# Patient Record
Sex: MALE | Race: BLACK OR AFRICAN AMERICAN | NOT HISPANIC OR LATINO | Employment: FULL TIME | URBAN - METROPOLITAN AREA
[De-identification: names, ages, dates, MRNs, and addresses within clinical notes are randomized per-mention and may not be internally consistent; named-entity substitution may affect disease eponyms.]

---

## 2021-07-24 ENCOUNTER — ANESTHESIA (OUTPATIENT)
Dept: ENDOSCOPY | Facility: HOSPITAL | Age: 66
End: 2021-07-24
Payer: MEDICARE

## 2021-07-24 ENCOUNTER — ANESTHESIA EVENT (OUTPATIENT)
Dept: ENDOSCOPY | Facility: HOSPITAL | Age: 66
End: 2021-07-24
Payer: MEDICARE

## 2021-07-24 ENCOUNTER — HOSPITAL ENCOUNTER (EMERGENCY)
Facility: HOSPITAL | Age: 66
Discharge: HOME | End: 2021-07-24
Attending: EMERGENCY MEDICINE
Payer: MEDICARE

## 2021-07-24 VITALS
BODY MASS INDEX: 26.22 KG/M2 | HEIGHT: 70 IN | HEART RATE: 71 BPM | TEMPERATURE: 98 F | SYSTOLIC BLOOD PRESSURE: 122 MMHG | OXYGEN SATURATION: 97 % | DIASTOLIC BLOOD PRESSURE: 84 MMHG | WEIGHT: 183.19 LBS | RESPIRATION RATE: 18 BRPM

## 2021-07-24 DIAGNOSIS — Z01.818 PRE-OP EXAM: ICD-10-CM

## 2021-07-24 DIAGNOSIS — K22.4 ESOPHAGEAL DYSMOTILITY: Primary | ICD-10-CM

## 2021-07-24 DIAGNOSIS — K29.30 CHRONIC SUPERFICIAL GASTRITIS WITHOUT BLEEDING: ICD-10-CM
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
## 2021-07-24 DIAGNOSIS — T18.128A FOOD IMPACTION OF ESOPHAGUS, INITIAL ENCOUNTER: ICD-10-CM

## 2021-07-24 LAB
ALBUMIN SERPL BCP-MCNC: 4.1 G/DL (ref 3.5–5.2)
ALP SERPL-CCNC: 73 U/L (ref 55–135)
ALT SERPL W/O P-5'-P-CCNC: 18 U/L (ref 10–44)
ANION GAP SERPL CALC-SCNC: 11 MMOL/L (ref 8–16)
AST SERPL-CCNC: 18 U/L (ref 10–40)
BASOPHILS # BLD AUTO: 0.02 K/UL (ref 0–0.2)
BASOPHILS NFR BLD: 0.3 % (ref 0–1.9)
BILIRUB SERPL-MCNC: 0.5 MG/DL (ref 0.1–1)
BUN SERPL-MCNC: 9 MG/DL (ref 8–23)
CALCIUM SERPL-MCNC: 9.2 MG/DL (ref 8.7–10.5)
CHLORIDE SERPL-SCNC: 104 MMOL/L (ref 95–110)
CO2 SERPL-SCNC: 24 MMOL/L (ref 23–29)
CREAT SERPL-MCNC: 1.1 MG/DL (ref 0.5–1.4)
DIFFERENTIAL METHOD: ABNORMAL
EOSINOPHIL # BLD AUTO: 0.2 K/UL (ref 0–0.5)
EOSINOPHIL NFR BLD: 3.9 % (ref 0–8)
ERYTHROCYTE [DISTWIDTH] IN BLOOD BY AUTOMATED COUNT: 13.8 % (ref 11.5–14.5)
EST. GFR  (AFRICAN AMERICAN): >60 ML/MIN/1.73 M^2
EST. GFR  (NON AFRICAN AMERICAN): >60 ML/MIN/1.73 M^2
GLUCOSE SERPL-MCNC: 115 MG/DL (ref 70–110)
HCT VFR BLD AUTO: 43.4 % (ref 40–54)
HGB BLD-MCNC: 13.4 G/DL (ref 14–18)
IMM GRANULOCYTES # BLD AUTO: 0.01 K/UL (ref 0–0.04)
IMM GRANULOCYTES NFR BLD AUTO: 0.2 % (ref 0–0.5)
LYMPHOCYTES # BLD AUTO: 1.4 K/UL (ref 1–4.8)
LYMPHOCYTES NFR BLD: 22.2 % (ref 18–48)
MCH RBC QN AUTO: 27 PG (ref 27–31)
MCHC RBC AUTO-ENTMCNC: 30.9 G/DL (ref 32–36)
MCV RBC AUTO: 87 FL (ref 82–98)
MONOCYTES # BLD AUTO: 0.3 K/UL (ref 0.3–1)
MONOCYTES NFR BLD: 5.6 % (ref 4–15)
NEUTROPHILS # BLD AUTO: 4.2 K/UL (ref 1.8–7.7)
NEUTROPHILS NFR BLD: 67.8 % (ref 38–73)
NRBC BLD-RTO: 0 /100 WBC
PLATELET # BLD AUTO: 171 K/UL (ref 150–450)
PMV BLD AUTO: 9 FL (ref 9.2–12.9)
POTASSIUM SERPL-SCNC: 3.9 MMOL/L (ref 3.5–5.1)
PROT SERPL-MCNC: 7.7 G/DL (ref 6–8.4)
RBC # BLD AUTO: 4.97 M/UL (ref 4.6–6.2)
SODIUM SERPL-SCNC: 139 MMOL/L (ref 136–145)
WBC # BLD AUTO: 6.12 K/UL (ref 3.9–12.7)

## 2021-07-24 PROCEDURE — 88305 TISSUE EXAM BY PATHOLOGIST: CPT | Mod: 26,,, | Performed by: PATHOLOGY

## 2021-07-24 PROCEDURE — 27201042 HC RETRIEVAL NET: Performed by: INTERNAL MEDICINE

## 2021-07-24 PROCEDURE — 80053 COMPREHEN METABOLIC PANEL: CPT | Performed by: EMERGENCY MEDICINE

## 2021-07-24 PROCEDURE — 27201012 HC FORCEPS, HOT/COLD, DISP: Performed by: INTERNAL MEDICINE

## 2021-07-24 PROCEDURE — 43239 EGD BIOPSY SINGLE/MULTIPLE: CPT | Performed by: INTERNAL MEDICINE

## 2021-07-24 PROCEDURE — 99499 NO LOS: ICD-10-PCS | Mod: ,,, | Performed by: INTERNAL MEDICINE

## 2021-07-24 PROCEDURE — 93005 ELECTROCARDIOGRAM TRACING: CPT

## 2021-07-24 PROCEDURE — 25000003 PHARM REV CODE 250: Performed by: NURSE ANESTHETIST, CERTIFIED REGISTERED

## 2021-07-24 PROCEDURE — 93010 EKG 12-LEAD: ICD-10-PCS | Mod: ,,, | Performed by: INTERNAL MEDICINE

## 2021-07-24 PROCEDURE — 93010 ELECTROCARDIOGRAM REPORT: CPT | Mod: ,,, | Performed by: INTERNAL MEDICINE

## 2021-07-24 PROCEDURE — 88342 IMHCHEM/IMCYTCHM 1ST ANTB: CPT | Performed by: PATHOLOGY

## 2021-07-24 PROCEDURE — 88305 TISSUE EXAM BY PATHOLOGIST: ICD-10-PCS | Mod: 26,,, | Performed by: PATHOLOGY

## 2021-07-24 PROCEDURE — 37000009 HC ANESTHESIA EA ADD 15 MINS: Performed by: INTERNAL MEDICINE

## 2021-07-24 PROCEDURE — 37000008 HC ANESTHESIA 1ST 15 MINUTES: Performed by: INTERNAL MEDICINE

## 2021-07-24 PROCEDURE — 88305 TISSUE EXAM BY PATHOLOGIST: CPT | Performed by: PATHOLOGY

## 2021-07-24 PROCEDURE — 88342 IMHCHEM/IMCYTCHM 1ST ANTB: CPT | Mod: 26,,, | Performed by: PATHOLOGY

## 2021-07-24 PROCEDURE — 99499 UNLISTED E&M SERVICE: CPT | Mod: ,,, | Performed by: INTERNAL MEDICINE

## 2021-07-24 PROCEDURE — 63600175 PHARM REV CODE 636 W HCPCS: Mod: JG | Performed by: NURSE PRACTITIONER

## 2021-07-24 PROCEDURE — 85025 COMPLETE CBC W/AUTO DIFF WBC: CPT | Performed by: EMERGENCY MEDICINE

## 2021-07-24 PROCEDURE — 96374 THER/PROPH/DIAG INJ IV PUSH: CPT

## 2021-07-24 PROCEDURE — 43239 EGD BIOPSY SINGLE/MULTIPLE: CPT | Mod: ,,, | Performed by: INTERNAL MEDICINE

## 2021-07-24 PROCEDURE — 63600175 PHARM REV CODE 636 W HCPCS: Performed by: NURSE ANESTHETIST, CERTIFIED REGISTERED

## 2021-07-24 PROCEDURE — 88342 CHG IMMUNOCYTOCHEMISTRY: ICD-10-PCS | Mod: 26,,, | Performed by: PATHOLOGY

## 2021-07-24 PROCEDURE — 43239 PR EGD, FLEX, W/BIOPSY, SGL/MULTI: ICD-10-PCS | Mod: ,,, | Performed by: INTERNAL MEDICINE

## 2021-07-24 PROCEDURE — 99291 CRITICAL CARE FIRST HOUR: CPT | Mod: 25

## 2021-07-24 RX ORDER — LIDOCAINE HYDROCHLORIDE 10 MG/ML
INJECTION, SOLUTION EPIDURAL; INFILTRATION; INTRACAUDAL; PERINEURAL
Status: DISCONTINUED | OUTPATIENT
Start: 2021-07-24 | End: 2021-07-24

## 2021-07-24 RX ORDER — PROPOFOL 10 MG/ML
VIAL (ML) INTRAVENOUS
Status: DISCONTINUED | OUTPATIENT
Start: 2021-07-24 | End: 2021-07-24

## 2021-07-24 RX ORDER — SODIUM CHLORIDE 0.9 % (FLUSH) 0.9 %
10 SYRINGE (ML) INJECTION
Status: CANCELLED | OUTPATIENT
Start: 2021-07-24

## 2021-07-24 RX ORDER — SUCCINYLCHOLINE CHLORIDE 20 MG/ML
INJECTION INTRAMUSCULAR; INTRAVENOUS
Status: DISCONTINUED | OUTPATIENT
Start: 2021-07-24 | End: 2021-07-24

## 2021-07-24 RX ORDER — SODIUM CHLORIDE, SODIUM LACTATE, POTASSIUM CHLORIDE, CALCIUM CHLORIDE 600; 310; 30; 20 MG/100ML; MG/100ML; MG/100ML; MG/100ML
INJECTION, SOLUTION INTRAVENOUS CONTINUOUS
Status: CANCELLED | OUTPATIENT
Start: 2021-07-24

## 2021-07-24 RX ORDER — GLUCAGON 1 MG
1 KIT INJECTION
Status: COMPLETED | OUTPATIENT
Start: 2021-07-24 | End: 2021-07-24

## 2021-07-24 RX ADMIN — PROPOFOL 160 MG: 10 INJECTION, EMULSION INTRAVENOUS at 06:07

## 2021-07-24 RX ADMIN — GLUCAGON HYDROCHLORIDE 1 MG: KIT at 05:07

## 2021-07-24 RX ADMIN — LIDOCAINE HYDROCHLORIDE 5 ML: 10 INJECTION, SOLUTION EPIDURAL; INFILTRATION; INTRACAUDAL; PERINEURAL at 06:07

## 2021-07-24 RX ADMIN — SUCCINYLCHOLINE CHLORIDE 140 MG: 20 INJECTION, SOLUTION INTRAMUSCULAR; INTRAVENOUS at 06:07

## 2021-07-31 LAB
FINAL PATHOLOGIC DIAGNOSIS: NORMAL
GROSS: NORMAL
Lab: NORMAL

## 2021-08-04 ENCOUNTER — PATIENT MESSAGE (OUTPATIENT)
Dept: GASTROENTEROLOGY | Facility: CLINIC | Age: 66
End: 2021-08-04

## 2021-08-09 ENCOUNTER — PATIENT MESSAGE (OUTPATIENT)
Dept: GASTROENTEROLOGY | Facility: CLINIC | Age: 66
End: 2021-08-09

## 2022-01-12 ENCOUNTER — PATIENT MESSAGE (OUTPATIENT)
Dept: ENDOSCOPY | Facility: HOSPITAL | Age: 67
End: 2022-01-12
Payer: MEDICARE

## 2022-05-02 ENCOUNTER — HOSPITAL ENCOUNTER (OUTPATIENT)
Dept: RADIOLOGY | Facility: HOSPITAL | Age: 67
Discharge: HOME | End: 2022-05-02
Attending: INTERNAL MEDICINE
Payer: MEDICARE

## 2022-05-02 ENCOUNTER — OFFICE VISIT (OUTPATIENT)
Dept: INTERNAL MEDICINE | Facility: CLINIC | Age: 67
End: 2022-05-02
Payer: MEDICARE

## 2022-05-02 VITALS
TEMPERATURE: 98 F | HEART RATE: 74 BPM | HEIGHT: 70 IN | WEIGHT: 180.56 LBS | SYSTOLIC BLOOD PRESSURE: 118 MMHG | OXYGEN SATURATION: 96 % | BODY MASS INDEX: 25.85 KG/M2 | DIASTOLIC BLOOD PRESSURE: 70 MMHG

## 2022-05-02 DIAGNOSIS — Z90.2 STATUS POST PNEUMONECTOMY: ICD-10-CM

## 2022-05-02 DIAGNOSIS — Z12.5 ENCOUNTER FOR SCREENING FOR MALIGNANT NEOPLASM OF PROSTATE: ICD-10-CM

## 2022-05-02 DIAGNOSIS — R10.9 ABDOMINAL DISCOMFORT: ICD-10-CM

## 2022-05-02 DIAGNOSIS — Z00.00 ROUTINE GENERAL MEDICAL EXAMINATION AT HEALTH CARE FACILITY: Primary | ICD-10-CM

## 2022-05-02 DIAGNOSIS — K22.2 ESOPHAGEAL STRICTURE: ICD-10-CM

## 2022-05-02 DIAGNOSIS — E78.2 MIXED HYPERLIPIDEMIA: ICD-10-CM

## 2022-05-02 PROCEDURE — 99999 PR PBB SHADOW E&M-EST. PATIENT-LVL IV: CPT | Mod: PBBFAC,,, | Performed by: INTERNAL MEDICINE

## 2022-05-02 PROCEDURE — 71046 X-RAY EXAM CHEST 2 VIEWS: CPT | Mod: 26,,, | Performed by: RADIOLOGY

## 2022-05-02 PROCEDURE — 1125F PR PAIN SEVERITY QUANTIFIED, PAIN PRESENT: ICD-10-PCS | Mod: CPTII,S$GLB,, | Performed by: INTERNAL MEDICINE

## 2022-05-02 PROCEDURE — 71046 X-RAY EXAM CHEST 2 VIEWS: CPT | Mod: TC,FY,PO

## 2022-05-02 PROCEDURE — 3074F PR MOST RECENT SYSTOLIC BLOOD PRESSURE < 130 MM HG: ICD-10-PCS | Mod: CPTII,S$GLB,, | Performed by: INTERNAL MEDICINE

## 2022-05-02 PROCEDURE — 1125F AMNT PAIN NOTED PAIN PRSNT: CPT | Mod: CPTII,S$GLB,, | Performed by: INTERNAL MEDICINE

## 2022-05-02 PROCEDURE — 1160F RVW MEDS BY RX/DR IN RCRD: CPT | Mod: CPTII,S$GLB,, | Performed by: INTERNAL MEDICINE

## 2022-05-02 PROCEDURE — 99387 INIT PM E/M NEW PAT 65+ YRS: CPT | Mod: S$GLB,,, | Performed by: INTERNAL MEDICINE

## 2022-05-02 PROCEDURE — 1159F PR MEDICATION LIST DOCUMENTED IN MEDICAL RECORD: ICD-10-PCS | Mod: CPTII,S$GLB,, | Performed by: INTERNAL MEDICINE

## 2022-05-02 PROCEDURE — 3288F PR FALLS RISK ASSESSMENT DOCUMENTED: ICD-10-PCS | Mod: CPTII,S$GLB,, | Performed by: INTERNAL MEDICINE

## 2022-05-02 PROCEDURE — 99999 PR PBB SHADOW E&M-EST. PATIENT-LVL IV: ICD-10-PCS | Mod: PBBFAC,,, | Performed by: INTERNAL MEDICINE

## 2022-05-02 PROCEDURE — 3008F PR BODY MASS INDEX (BMI) DOCUMENTED: ICD-10-PCS | Mod: CPTII,S$GLB,, | Performed by: INTERNAL MEDICINE

## 2022-05-02 PROCEDURE — 71046 XR CHEST PA AND LATERAL: ICD-10-PCS | Mod: 26,,, | Performed by: RADIOLOGY

## 2022-05-02 PROCEDURE — 3288F FALL RISK ASSESSMENT DOCD: CPT | Mod: CPTII,S$GLB,, | Performed by: INTERNAL MEDICINE

## 2022-05-02 PROCEDURE — 1101F PR PT FALLS ASSESS DOC 0-1 FALLS W/OUT INJ PAST YR: ICD-10-PCS | Mod: CPTII,S$GLB,, | Performed by: INTERNAL MEDICINE

## 2022-05-02 PROCEDURE — 74019 RADEX ABDOMEN 2 VIEWS: CPT | Mod: TC,FY,PO

## 2022-05-02 PROCEDURE — 74019 XR ABDOMEN FLAT AND ERECT: ICD-10-PCS | Mod: 26,,, | Performed by: RADIOLOGY

## 2022-05-02 PROCEDURE — 1101F PT FALLS ASSESS-DOCD LE1/YR: CPT | Mod: CPTII,S$GLB,, | Performed by: INTERNAL MEDICINE

## 2022-05-02 PROCEDURE — 3078F PR MOST RECENT DIASTOLIC BLOOD PRESSURE < 80 MM HG: ICD-10-PCS | Mod: CPTII,S$GLB,, | Performed by: INTERNAL MEDICINE

## 2022-05-02 PROCEDURE — 1159F MED LIST DOCD IN RCRD: CPT | Mod: CPTII,S$GLB,, | Performed by: INTERNAL MEDICINE

## 2022-05-02 PROCEDURE — 3078F DIAST BP <80 MM HG: CPT | Mod: CPTII,S$GLB,, | Performed by: INTERNAL MEDICINE

## 2022-05-02 PROCEDURE — 99387 PR PREVENTIVE VISIT,NEW,65 & OVER: ICD-10-PCS | Mod: S$GLB,,, | Performed by: INTERNAL MEDICINE

## 2022-05-02 PROCEDURE — 74019 RADEX ABDOMEN 2 VIEWS: CPT | Mod: 26,,, | Performed by: RADIOLOGY

## 2022-05-02 PROCEDURE — 3008F BODY MASS INDEX DOCD: CPT | Mod: CPTII,S$GLB,, | Performed by: INTERNAL MEDICINE

## 2022-05-02 PROCEDURE — 3074F SYST BP LT 130 MM HG: CPT | Mod: CPTII,S$GLB,, | Performed by: INTERNAL MEDICINE

## 2022-05-02 PROCEDURE — 1160F PR REVIEW ALL MEDS BY PRESCRIBER/CLIN PHARMACIST DOCUMENTED: ICD-10-PCS | Mod: CPTII,S$GLB,, | Performed by: INTERNAL MEDICINE

## 2022-05-02 NOTE — PROGRESS NOTES
Subjective:       Patient ID: Morris Parada is a 66 y.o. male.    Chief Complaint: Establish Care    HPI Patient is a 66-year-old male presenting days new patient.  He was previously followed by Dr. Tolentino.  He states been followed for 5 years since he seen a primary care doctor.  He indicates a history of hyperlipidemia and some problems with esophageal stricture.  He states that he was seen by Dr. Briscoe and had an EGD done with some dilatation a little while back.  He states that was helpful but he has continued to experience issues with dysphagia since the dilatation.  He has not followed up with GI.    He also describes he has had some issues with some mild abdominal discomfort last few days.  States in the superior aspect of the abdomen up up around the area of the rib cage but it is below the ribs.  Is more to the left of midline.  It does not radiate.  He has no nausea vomiting associated with it.    Patient relates he has had problems with swallowing and GI type issues for most of his life.  He states when he was a child he had issues with reflux and food coming up and at some point they had removed part of his lung as result of this issue as food gotten into the lung.  He cannot remember whether was the right or left lung.  But he relates partial pneumonectomy in the past.    Review of Systems   Constitutional: Negative for fever and unexpected weight change.   HENT: Negative for hearing loss, postnasal drip and rhinorrhea.    Eyes: Negative for pain and visual disturbance.   Respiratory: Negative for cough, shortness of breath and wheezing.    Cardiovascular: Negative for chest pain and palpitations.   Gastrointestinal: Negative for constipation, diarrhea, nausea and vomiting.   Genitourinary: Negative for dysuria and hematuria.   Musculoskeletal: Negative for arthralgias, back pain, myalgias and neck stiffness.   Skin: Negative for pallor and rash.   Neurological: Negative for seizures, syncope and  "headaches.   Hematological: Negative for adenopathy.   Psychiatric/Behavioral: Negative for dysphoric mood. The patient is not nervous/anxious.        Objective:   /70   Pulse 74   Temp 97.5 °F (36.4 °C) (Temporal)   Ht 5' 10" (1.778 m)   Wt 81.9 kg (180 lb 8.9 oz)   SpO2 96%   BMI 25.91 kg/m²      Physical Exam  Vitals reviewed.   Constitutional:       General: He is not in acute distress.     Appearance: He is well-developed.   HENT:      Head: Normocephalic and atraumatic.      Right Ear: Tympanic membrane and ear canal normal.      Left Ear: Tympanic membrane and ear canal normal.   Eyes:      Pupils: Pupils are equal, round, and reactive to light.   Neck:      Thyroid: No thyromegaly.      Vascular: No JVD.   Cardiovascular:      Rate and Rhythm: Normal rate and regular rhythm.      Heart sounds: Normal heart sounds. No murmur heard.    No friction rub. No gallop.   Pulmonary:      Effort: Pulmonary effort is normal.      Breath sounds: Normal breath sounds. No wheezing or rales.   Abdominal:      General: Bowel sounds are normal. There is no distension.      Palpations: Abdomen is soft.      Tenderness: There is abdominal tenderness (Mild tenderness epigastric.  No mass.  No rebound.). There is no guarding or rebound.   Musculoskeletal:         General: Normal range of motion.      Cervical back: Normal range of motion and neck supple.   Lymphadenopathy:      Cervical: No cervical adenopathy.   Skin:     General: Skin is warm and dry.      Findings: No rash.   Neurological:      General: No focal deficit present.      Mental Status: He is alert and oriented to person, place, and time.      Cranial Nerves: No cranial nerve deficit.      Deep Tendon Reflexes: Reflexes are normal and symmetric.   Psychiatric:         Mood and Affect: Mood normal.         Judgment: Judgment normal.             Assessment:       1. Routine general medical examination at health care facility    2. Mixed hyperlipidemia  "   3. Esophageal stricture    4. Abdominal discomfort    5. Status post pneumonectomy    6. Encounter for screening for malignant neoplasm of prostate        Plan:   No problem-specific Assessment & Plan notes found for this encounter.    Routine general medical examination at health care facility  Comments:  Focus on good health habits, low fat diet, regular exercise, seatbelt use, sunscreen use  Orders:  -     CBC Auto Differential; Future; Expected date: 05/02/2022  -     Comprehensive Metabolic Panel; Future; Expected date: 05/02/2022  -     Lipid Panel; Future; Expected date: 05/02/2022    Mixed hyperlipidemia  -     CBC Auto Differential; Future; Expected date: 05/02/2022  -     Comprehensive Metabolic Panel; Future; Expected date: 05/02/2022  -     Lipid Panel; Future; Expected date: 05/02/2022    Esophageal stricture  Comments:  make referral to Dr. Briscoe  Orders:  -     Ambulatory referral/consult to Gastroenterology; Future; Expected date: 05/09/2022    Abdominal discomfort  Comments:  xrays today  Orders:  -     X-Ray Abdomen Flat And Erect; Future; Expected date: 05/02/2022    Status post pneumonectomy  Comments:  Reports partial pneumonectomy in childhood.  update xray  Orders:  -     X-Ray Chest PA And Lateral; Future; Expected date: 05/02/2022    Encounter for screening for malignant neoplasm of prostate  -     PSA, Screening; Future; Expected date: 05/02/2022    We will update lab work for a general health screening purposes.  We will make a follow-up appoint with Dr. Briscoe in Gastroenterology to reassess on the esophageal stricture and dysphagia issues.  Will get x-rays flat and upright of the abdomen given little discomfort he has gotten epigastric region.  If everything looks good on the films him a trial acid suppression as he is not on anything for acid at this point.  We will also get updated chest x-ray to see if we can determine if he has got volume loss on left of the right due to the  pneumonectomy so we can determine which lung may have been operated on.      Follow up in about 4 weeks (around 5/30/2022).

## 2022-05-03 ENCOUNTER — LAB VISIT (OUTPATIENT)
Dept: LAB | Facility: HOSPITAL | Age: 67
End: 2022-05-03
Attending: INTERNAL MEDICINE
Payer: MEDICARE

## 2022-05-03 ENCOUNTER — PATIENT MESSAGE (OUTPATIENT)
Dept: INTERNAL MEDICINE | Facility: CLINIC | Age: 67
End: 2022-05-03
Payer: MEDICARE

## 2022-05-03 DIAGNOSIS — D64.9 ANEMIA, UNSPECIFIED TYPE: ICD-10-CM

## 2022-05-03 DIAGNOSIS — R97.20 INCREASED PROSTATE SPECIFIC ANTIGEN (PSA) VELOCITY: ICD-10-CM

## 2022-05-03 DIAGNOSIS — E78.2 MIXED HYPERLIPIDEMIA: Primary | ICD-10-CM

## 2022-05-03 LAB
FERRITIN SERPL-MCNC: 176 NG/ML (ref 20–300)
IRON SERPL-MCNC: 66 UG/DL (ref 45–160)
SATURATED IRON: 22 % (ref 20–50)
TOTAL IRON BINDING CAPACITY: 299 UG/DL (ref 250–450)
TRANSFERRIN SERPL-MCNC: 202 MG/DL (ref 200–375)

## 2022-05-03 PROCEDURE — 36415 COLL VENOUS BLD VENIPUNCTURE: CPT | Mod: PO | Performed by: INTERNAL MEDICINE

## 2022-05-03 PROCEDURE — 82728 ASSAY OF FERRITIN: CPT | Performed by: INTERNAL MEDICINE

## 2022-05-03 PROCEDURE — 84466 ASSAY OF TRANSFERRIN: CPT | Performed by: INTERNAL MEDICINE

## 2022-05-03 RX ORDER — DOXYCYCLINE HYCLATE 100 MG
100 TABLET ORAL EVERY 12 HOURS
Qty: 60 TABLET | Refills: 0 | Status: SHIPPED | OUTPATIENT
Start: 2022-05-03 | End: 2022-06-02

## 2022-05-03 RX ORDER — ATORVASTATIN CALCIUM 40 MG/1
40 TABLET, FILM COATED ORAL DAILY
Qty: 90 TABLET | Refills: 3 | Status: SHIPPED | OUTPATIENT
Start: 2022-05-03 | End: 2023-05-15

## 2022-05-03 NOTE — TELEPHONE ENCOUNTER
Mild anemia on labs.  Follow up labs ordered.    Cholesterol numbers are very high.  Needs to start statin.  Atorvastatin 40 mg once daily. Repeat labs in six months.    PSA is 5.8.  This is higher than target.  Start Doxycycline 100 mg twice daily for 30 days.  Repeat psa in one month.

## 2022-05-04 DIAGNOSIS — D64.9 ANEMIA, UNSPECIFIED TYPE: Primary | ICD-10-CM

## 2022-06-03 ENCOUNTER — LAB VISIT (OUTPATIENT)
Dept: LAB | Facility: HOSPITAL | Age: 67
End: 2022-06-03
Attending: INTERNAL MEDICINE
Payer: MEDICARE

## 2022-06-03 DIAGNOSIS — R97.20 INCREASED PROSTATE SPECIFIC ANTIGEN (PSA) VELOCITY: ICD-10-CM

## 2022-06-03 LAB — COMPLEXED PSA SERPL-MCNC: 6.2 NG/ML (ref 0–4)

## 2022-06-03 PROCEDURE — 36415 COLL VENOUS BLD VENIPUNCTURE: CPT | Mod: PO | Performed by: INTERNAL MEDICINE

## 2022-06-03 PROCEDURE — 84153 ASSAY OF PSA TOTAL: CPT | Performed by: INTERNAL MEDICINE

## 2022-06-06 ENCOUNTER — TELEPHONE (OUTPATIENT)
Dept: INTERNAL MEDICINE | Facility: CLINIC | Age: 67
End: 2022-06-06
Payer: MEDICARE

## 2022-06-06 ENCOUNTER — OFFICE VISIT (OUTPATIENT)
Dept: GASTROENTEROLOGY | Facility: CLINIC | Age: 67
End: 2022-06-06
Payer: MEDICARE

## 2022-06-06 VITALS
DIASTOLIC BLOOD PRESSURE: 74 MMHG | HEIGHT: 70 IN | SYSTOLIC BLOOD PRESSURE: 118 MMHG | HEART RATE: 73 BPM | BODY MASS INDEX: 26.18 KG/M2 | OXYGEN SATURATION: 96 % | WEIGHT: 182.88 LBS

## 2022-06-06 DIAGNOSIS — R97.20 INCREASED PROSTATE SPECIFIC ANTIGEN (PSA) VELOCITY: Primary | ICD-10-CM

## 2022-06-06 DIAGNOSIS — R13.19 ESOPHAGEAL DYSPHAGIA: Primary | ICD-10-CM

## 2022-06-06 DIAGNOSIS — K22.2 ESOPHAGEAL STRICTURE: ICD-10-CM

## 2022-06-06 DIAGNOSIS — K22.2 ESOPHAGEAL STENOSIS: ICD-10-CM

## 2022-06-06 PROCEDURE — 3078F PR MOST RECENT DIASTOLIC BLOOD PRESSURE < 80 MM HG: ICD-10-PCS | Mod: CPTII,S$GLB,, | Performed by: INTERNAL MEDICINE

## 2022-06-06 PROCEDURE — 1159F MED LIST DOCD IN RCRD: CPT | Mod: CPTII,S$GLB,, | Performed by: INTERNAL MEDICINE

## 2022-06-06 PROCEDURE — 3288F FALL RISK ASSESSMENT DOCD: CPT | Mod: CPTII,S$GLB,, | Performed by: INTERNAL MEDICINE

## 2022-06-06 PROCEDURE — 3288F PR FALLS RISK ASSESSMENT DOCUMENTED: ICD-10-PCS | Mod: CPTII,S$GLB,, | Performed by: INTERNAL MEDICINE

## 2022-06-06 PROCEDURE — 3008F BODY MASS INDEX DOCD: CPT | Mod: CPTII,S$GLB,, | Performed by: INTERNAL MEDICINE

## 2022-06-06 PROCEDURE — 3008F PR BODY MASS INDEX (BMI) DOCUMENTED: ICD-10-PCS | Mod: CPTII,S$GLB,, | Performed by: INTERNAL MEDICINE

## 2022-06-06 PROCEDURE — 99999 PR PBB SHADOW E&M-EST. PATIENT-LVL III: CPT | Mod: PBBFAC,,, | Performed by: INTERNAL MEDICINE

## 2022-06-06 PROCEDURE — 1101F PR PT FALLS ASSESS DOC 0-1 FALLS W/OUT INJ PAST YR: ICD-10-PCS | Mod: CPTII,S$GLB,, | Performed by: INTERNAL MEDICINE

## 2022-06-06 PROCEDURE — 1160F RVW MEDS BY RX/DR IN RCRD: CPT | Mod: CPTII,S$GLB,, | Performed by: INTERNAL MEDICINE

## 2022-06-06 PROCEDURE — 3074F PR MOST RECENT SYSTOLIC BLOOD PRESSURE < 130 MM HG: ICD-10-PCS | Mod: CPTII,S$GLB,, | Performed by: INTERNAL MEDICINE

## 2022-06-06 PROCEDURE — 99213 OFFICE O/P EST LOW 20 MIN: CPT | Mod: S$GLB,,, | Performed by: INTERNAL MEDICINE

## 2022-06-06 PROCEDURE — 99213 PR OFFICE/OUTPT VISIT, EST, LEVL III, 20-29 MIN: ICD-10-PCS | Mod: S$GLB,,, | Performed by: INTERNAL MEDICINE

## 2022-06-06 PROCEDURE — 99999 PR PBB SHADOW E&M-EST. PATIENT-LVL III: ICD-10-PCS | Mod: PBBFAC,,, | Performed by: INTERNAL MEDICINE

## 2022-06-06 PROCEDURE — 1101F PT FALLS ASSESS-DOCD LE1/YR: CPT | Mod: CPTII,S$GLB,, | Performed by: INTERNAL MEDICINE

## 2022-06-06 PROCEDURE — 3074F SYST BP LT 130 MM HG: CPT | Mod: CPTII,S$GLB,, | Performed by: INTERNAL MEDICINE

## 2022-06-06 PROCEDURE — 1159F PR MEDICATION LIST DOCUMENTED IN MEDICAL RECORD: ICD-10-PCS | Mod: CPTII,S$GLB,, | Performed by: INTERNAL MEDICINE

## 2022-06-06 PROCEDURE — 1160F PR REVIEW ALL MEDS BY PRESCRIBER/CLIN PHARMACIST DOCUMENTED: ICD-10-PCS | Mod: CPTII,S$GLB,, | Performed by: INTERNAL MEDICINE

## 2022-06-06 PROCEDURE — 3078F DIAST BP <80 MM HG: CPT | Mod: CPTII,S$GLB,, | Performed by: INTERNAL MEDICINE

## 2022-06-06 NOTE — TELEPHONE ENCOUNTER
----- Message from Shell Baum sent at 6/6/2022  2:40 PM CDT -----  Patient is returning a phone call.  Who left a message for the patient: Keyla  Does patient know what this is regarding:  No

## 2022-06-06 NOTE — TELEPHONE ENCOUNTER
Spoke with patient, and informed him that his PSA remains elevated and Dr. Miller is referring him to a urologist. Scheduled patient's appointment.

## 2022-06-06 NOTE — TELEPHONE ENCOUNTER
----- Message from Flavio Miller MD sent at 6/6/2022  6:41 AM CDT -----  Your psa remains elevated.  Referral to urology for further evaluation is recommended.    Flavio Miller MD

## 2022-06-06 NOTE — H&P (VIEW-ONLY)
Subjective:       Patient ID: Morris Parada is a 66 y.o. male.    Chief Complaint: Gastroesophageal Reflux    The patient is known to me from previous encounters when he was seen in the hospital in July last year. He was having difficulty with dysphagia.  He has a history of having issues with his esophagus as a child which required surgical intervention.  At the time of his previous assessment on July 24th of last year, his distal esophagus appeared dilated with standing food.  There was an area near the gastroesophageal junction which appeared somewhat stenotic but was patent.  Despite this, food was sitting in the dilated distal area of the esophagus, part of which had to be scooped out with a retrieval basket.  Other components of the standing food products were pushed through the opening at the EG junction into the stomach.  The more proximal areas of the esophagus appeared to have active contractility.  Is not clear exactly what type of surgery the patient had performed, but he has been able to manage with food intake up to this point though in limited fashion.  The initial presentation was complicated by what sounds like aspiration pneumonia leading to need for lung surgery associated with this pathology.  The as are also somewhat fascinating history of suspected tuberculosis related to the lung intervention.  Patient states he was on a TB what for several months without being expected to live thinking that he had TB.    The patient is continue to have problems with dysphagia, and on occasion having food regurgitate up to the back of his throat.  This is not surprising and that we have done nothing to address the pathology discovered at the time of his evaluation last year.  The patient was lost to follow-up, but is here now to seek to undergo further testing to try and uncover what we are dealing with presently.        Review of Systems   Constitutional: Negative.  Negative for activity change, appetite  change, chills, diaphoresis, fatigue, fever and unexpected weight change.   HENT: Positive for trouble swallowing. Negative for congestion, ear discharge, facial swelling, hearing loss, nosebleeds, postnasal drip, sinus pressure, sneezing, tinnitus and voice change.    Eyes: Negative for photophobia, redness and visual disturbance.   Respiratory: Negative for cough, chest tightness, shortness of breath and wheezing.    Cardiovascular: Negative for chest pain and palpitations.   Gastrointestinal: Positive for abdominal pain. Negative for abdominal distention, blood in stool, constipation, diarrhea, nausea, rectal pain and vomiting.        [place]   Genitourinary: Negative for difficulty urinating, dysuria, flank pain, frequency, hematuria, penile discharge, scrotal swelling, testicular pain and urgency.   Musculoskeletal: Negative for arthralgias, back pain, gait problem, joint swelling, myalgias and neck stiffness.   Skin: Negative for color change, pallor, rash and wound.   Neurological: Negative for dizziness, tremors, seizures, syncope, facial asymmetry, speech difficulty, weakness, light-headedness, numbness and headaches.   Hematological: Negative for adenopathy. Does not bruise/bleed easily.   Psychiatric/Behavioral: Negative for agitation, confusion, hallucinations, sleep disturbance and suicidal ideas.       Objective:      Physical Exam  Vitals reviewed.         Assessment:   Esophageal dysphagia  -     Case Request Endoscopy: EGD (ESOPHAGOGASTRODUODENOSCOPY), MOTILITY STUDY, ESOPHAGUS, USING HIGH RESOLUTION MANOMETRY    Esophageal stricture  Comments:  make referral to Dr. Briscoe  Orders:  -     Ambulatory referral/consult to Gastroenterology    Esophageal stenosis  -     Case Request Endoscopy: EGD (ESOPHAGOGASTRODUODENOSCOPY), MOTILITY STUDY, ESOPHAGUS, USING HIGH RESOLUTION MANOMETRY                     Likely post surgical.       Plan:   As above.

## 2022-06-06 NOTE — TELEPHONE ENCOUNTER
Spoke with patient, and informed patient that his PSA is elevated, and Dr. Miller is referring him to see a urologist. Scheduled patient appointment.

## 2022-06-08 ENCOUNTER — OFFICE VISIT (OUTPATIENT)
Dept: INTERNAL MEDICINE | Facility: CLINIC | Age: 67
End: 2022-06-08
Payer: MEDICARE

## 2022-06-08 VITALS
WEIGHT: 181 LBS | SYSTOLIC BLOOD PRESSURE: 110 MMHG | TEMPERATURE: 98 F | OXYGEN SATURATION: 95 % | HEART RATE: 77 BPM | DIASTOLIC BLOOD PRESSURE: 80 MMHG | BODY MASS INDEX: 25.97 KG/M2

## 2022-06-08 DIAGNOSIS — E78.2 MIXED HYPERLIPIDEMIA: ICD-10-CM

## 2022-06-08 DIAGNOSIS — N52.9 ERECTILE DYSFUNCTION, UNSPECIFIED ERECTILE DYSFUNCTION TYPE: ICD-10-CM

## 2022-06-08 DIAGNOSIS — R97.20 ELEVATED PSA: Primary | ICD-10-CM

## 2022-06-08 PROCEDURE — 3079F DIAST BP 80-89 MM HG: CPT | Mod: CPTII,S$GLB,, | Performed by: INTERNAL MEDICINE

## 2022-06-08 PROCEDURE — 1101F PR PT FALLS ASSESS DOC 0-1 FALLS W/OUT INJ PAST YR: ICD-10-PCS | Mod: CPTII,S$GLB,, | Performed by: INTERNAL MEDICINE

## 2022-06-08 PROCEDURE — 1101F PT FALLS ASSESS-DOCD LE1/YR: CPT | Mod: CPTII,S$GLB,, | Performed by: INTERNAL MEDICINE

## 2022-06-08 PROCEDURE — 1159F PR MEDICATION LIST DOCUMENTED IN MEDICAL RECORD: ICD-10-PCS | Mod: CPTII,S$GLB,, | Performed by: INTERNAL MEDICINE

## 2022-06-08 PROCEDURE — 1126F AMNT PAIN NOTED NONE PRSNT: CPT | Mod: CPTII,S$GLB,, | Performed by: INTERNAL MEDICINE

## 2022-06-08 PROCEDURE — 99214 OFFICE O/P EST MOD 30 MIN: CPT | Mod: S$GLB,,, | Performed by: INTERNAL MEDICINE

## 2022-06-08 PROCEDURE — 99999 PR PBB SHADOW E&M-EST. PATIENT-LVL IV: CPT | Mod: PBBFAC,,, | Performed by: INTERNAL MEDICINE

## 2022-06-08 PROCEDURE — 99999 PR PBB SHADOW E&M-EST. PATIENT-LVL IV: ICD-10-PCS | Mod: PBBFAC,,, | Performed by: INTERNAL MEDICINE

## 2022-06-08 PROCEDURE — 1160F RVW MEDS BY RX/DR IN RCRD: CPT | Mod: CPTII,S$GLB,, | Performed by: INTERNAL MEDICINE

## 2022-06-08 PROCEDURE — 3074F SYST BP LT 130 MM HG: CPT | Mod: CPTII,S$GLB,, | Performed by: INTERNAL MEDICINE

## 2022-06-08 PROCEDURE — 99214 PR OFFICE/OUTPT VISIT, EST, LEVL IV, 30-39 MIN: ICD-10-PCS | Mod: S$GLB,,, | Performed by: INTERNAL MEDICINE

## 2022-06-08 PROCEDURE — 3074F PR MOST RECENT SYSTOLIC BLOOD PRESSURE < 130 MM HG: ICD-10-PCS | Mod: CPTII,S$GLB,, | Performed by: INTERNAL MEDICINE

## 2022-06-08 PROCEDURE — 3008F BODY MASS INDEX DOCD: CPT | Mod: CPTII,S$GLB,, | Performed by: INTERNAL MEDICINE

## 2022-06-08 PROCEDURE — 3288F PR FALLS RISK ASSESSMENT DOCUMENTED: ICD-10-PCS | Mod: CPTII,S$GLB,, | Performed by: INTERNAL MEDICINE

## 2022-06-08 PROCEDURE — 3079F PR MOST RECENT DIASTOLIC BLOOD PRESSURE 80-89 MM HG: ICD-10-PCS | Mod: CPTII,S$GLB,, | Performed by: INTERNAL MEDICINE

## 2022-06-08 PROCEDURE — 3008F PR BODY MASS INDEX (BMI) DOCUMENTED: ICD-10-PCS | Mod: CPTII,S$GLB,, | Performed by: INTERNAL MEDICINE

## 2022-06-08 PROCEDURE — 1126F PR PAIN SEVERITY QUANTIFIED, NO PAIN PRESENT: ICD-10-PCS | Mod: CPTII,S$GLB,, | Performed by: INTERNAL MEDICINE

## 2022-06-08 PROCEDURE — 3288F FALL RISK ASSESSMENT DOCD: CPT | Mod: CPTII,S$GLB,, | Performed by: INTERNAL MEDICINE

## 2022-06-08 PROCEDURE — 1160F PR REVIEW ALL MEDS BY PRESCRIBER/CLIN PHARMACIST DOCUMENTED: ICD-10-PCS | Mod: CPTII,S$GLB,, | Performed by: INTERNAL MEDICINE

## 2022-06-08 PROCEDURE — 1159F MED LIST DOCD IN RCRD: CPT | Mod: CPTII,S$GLB,, | Performed by: INTERNAL MEDICINE

## 2022-06-08 RX ORDER — SILDENAFIL 100 MG/1
100 TABLET, FILM COATED ORAL DAILY PRN
Qty: 10 TABLET | Refills: 11 | Status: SHIPPED | OUTPATIENT
Start: 2022-06-08 | End: 2023-01-13 | Stop reason: SDUPTHER

## 2022-06-08 NOTE — PROGRESS NOTES
Subjective:       Patient ID: Morris Parada is a 66 y.o. male.    Chief Complaint: Follow-up    HPI Patient is a 66-year-old male presenting today following a recent lab work.  His leg was his PSA was 5.8.  Treated him with 30 day treatment with doxycycline and repeated the PSA is going up to 6.2.  He relates no family history of prostate cancer.  He indicates he does have some urinary symptoms with decreased strength of stream and some occasional feeling of incomplete voiding.    Patient has history of hyperlipidemia.  He is now on atorvastatin tolerating it well.  We have follow-up lab work set up for at the in the summer.    The patient does complain of some erectile dysfunction.  States he has just does not have good erectile performance at all.  He indicates his libido is still strong.  He has not tried any medications in the past.    Review of Systems   Constitutional: Negative for fever and unexpected weight change.   Respiratory: Negative for cough, shortness of breath and wheezing.    Cardiovascular: Negative for chest pain and palpitations.   Gastrointestinal: Negative for constipation, diarrhea, nausea and vomiting.   Genitourinary: Negative for dysuria and hematuria.       Objective:   /80   Pulse 77   Temp 98.1 °F (36.7 °C)   Wt 82.1 kg (181 lb)   SpO2 95%   BMI 25.97 kg/m²      Physical Exam  Vitals reviewed.   Constitutional:       Appearance: He is well-developed.   HENT:      Head: Normocephalic and atraumatic.      Right Ear: External ear normal.      Left Ear: External ear normal.   Eyes:      Pupils: Pupils are equal, round, and reactive to light.   Neck:      Thyroid: No thyromegaly.   Cardiovascular:      Rate and Rhythm: Normal rate and regular rhythm.      Heart sounds: Normal heart sounds. No murmur heard.    No friction rub. No gallop.   Pulmonary:      Effort: Pulmonary effort is normal.      Breath sounds: Normal breath sounds. No wheezing or rales.   Musculoskeletal:       Cervical back: Neck supple.   Psychiatric:         Mood and Affect: Mood normal.         Lab Visit on 06/03/2022   Component Date Value    PSA Diagnostic 06/03/2022 6.2 (A)       Assessment:       1. Elevated PSA    2. Mixed hyperlipidemia    3. Erectile dysfunction, unspecified erectile dysfunction type        Plan:   No problem-specific Assessment & Plan notes found for this encounter.    Elevated PSA  Comments:  Discussed lab result.  Recommend urology, scheduled for later this month with Dr. Sanchez    Mixed hyperlipidemia  Comments:  continue lipitor    Erectile dysfunction, unspecified erectile dysfunction type  Comments:  Trial of viagra sent to pharmacy  Orders:  -     sildenafiL (VIAGRA) 100 MG tablet; Take 1 tablet (100 mg total) by mouth daily as needed for Erectile Dysfunction.  Dispense: 10 tablet; Refill: 11          Follow up if symptoms worsen or fail to improve.

## 2022-06-13 ENCOUNTER — IMMUNIZATION (OUTPATIENT)
Dept: PHARMACY | Facility: CLINIC | Age: 67
End: 2022-06-13
Payer: MEDICARE

## 2022-06-13 DIAGNOSIS — Z23 NEED FOR VACCINATION: Primary | ICD-10-CM

## 2022-06-28 ENCOUNTER — OFFICE VISIT (OUTPATIENT)
Dept: UROLOGY | Facility: CLINIC | Age: 67
End: 2022-06-28
Payer: MEDICARE

## 2022-06-28 VITALS — WEIGHT: 181 LBS | BODY MASS INDEX: 25.91 KG/M2 | HEIGHT: 70 IN

## 2022-06-28 DIAGNOSIS — R97.20 ELEVATED PSA: Primary | ICD-10-CM

## 2022-06-28 DIAGNOSIS — R97.20 INCREASED PROSTATE SPECIFIC ANTIGEN (PSA) VELOCITY: ICD-10-CM

## 2022-06-28 PROCEDURE — 1159F PR MEDICATION LIST DOCUMENTED IN MEDICAL RECORD: ICD-10-PCS | Mod: CPTII,S$GLB,, | Performed by: UROLOGY

## 2022-06-28 PROCEDURE — 1160F RVW MEDS BY RX/DR IN RCRD: CPT | Mod: CPTII,S$GLB,, | Performed by: UROLOGY

## 2022-06-28 PROCEDURE — 99999 PR PBB SHADOW E&M-EST. PATIENT-LVL III: ICD-10-PCS | Mod: PBBFAC,,, | Performed by: UROLOGY

## 2022-06-28 PROCEDURE — 3008F BODY MASS INDEX DOCD: CPT | Mod: CPTII,S$GLB,, | Performed by: UROLOGY

## 2022-06-28 PROCEDURE — 1101F PT FALLS ASSESS-DOCD LE1/YR: CPT | Mod: CPTII,S$GLB,, | Performed by: UROLOGY

## 2022-06-28 PROCEDURE — 1126F PR PAIN SEVERITY QUANTIFIED, NO PAIN PRESENT: ICD-10-PCS | Mod: CPTII,S$GLB,, | Performed by: UROLOGY

## 2022-06-28 PROCEDURE — 99204 PR OFFICE/OUTPT VISIT, NEW, LEVL IV, 45-59 MIN: ICD-10-PCS | Mod: S$GLB,,, | Performed by: UROLOGY

## 2022-06-28 PROCEDURE — 1101F PR PT FALLS ASSESS DOC 0-1 FALLS W/OUT INJ PAST YR: ICD-10-PCS | Mod: CPTII,S$GLB,, | Performed by: UROLOGY

## 2022-06-28 PROCEDURE — 99204 OFFICE O/P NEW MOD 45 MIN: CPT | Mod: S$GLB,,, | Performed by: UROLOGY

## 2022-06-28 PROCEDURE — 1126F AMNT PAIN NOTED NONE PRSNT: CPT | Mod: CPTII,S$GLB,, | Performed by: UROLOGY

## 2022-06-28 PROCEDURE — 99999 PR PBB SHADOW E&M-EST. PATIENT-LVL III: CPT | Mod: PBBFAC,,, | Performed by: UROLOGY

## 2022-06-28 PROCEDURE — 1159F MED LIST DOCD IN RCRD: CPT | Mod: CPTII,S$GLB,, | Performed by: UROLOGY

## 2022-06-28 PROCEDURE — 1160F PR REVIEW ALL MEDS BY PRESCRIBER/CLIN PHARMACIST DOCUMENTED: ICD-10-PCS | Mod: CPTII,S$GLB,, | Performed by: UROLOGY

## 2022-06-28 PROCEDURE — 3288F FALL RISK ASSESSMENT DOCD: CPT | Mod: CPTII,S$GLB,, | Performed by: UROLOGY

## 2022-06-28 PROCEDURE — 3008F PR BODY MASS INDEX (BMI) DOCUMENTED: ICD-10-PCS | Mod: CPTII,S$GLB,, | Performed by: UROLOGY

## 2022-06-28 PROCEDURE — 3288F PR FALLS RISK ASSESSMENT DOCUMENTED: ICD-10-PCS | Mod: CPTII,S$GLB,, | Performed by: UROLOGY

## 2022-06-28 NOTE — PROGRESS NOTES
Chief Complaint:  Elevated PSA    HPI:   Morris Parada is a 66 y.o. male that presents today as a referral from Dr. Miller for elevated PSA.  Patient had routine labs obtained which showed an elevated PSA to 5.8.  This was confirmed a month later at 6.2.  He denies any family history of  cancers.  Denies gross hematuria.  He does have a urinary issues with incomplete emptying, urgency, and weak stream but is not currently on any medications for his prostate.  He notes that he tends to hold his urine for longer than usual due to him being a  and not being able to stop.  Denies any prior urologic procedures.  Also notes ED for the last two years, states that he has less desire than prior.  IPSS - 4/2/4/4/3/1/1 = 19 QOL - 4(mostly dissatisfied)    PMH:  Past Medical History:   Diagnosis Date    Esophageal diverticulum     Helicobacter pylori ab+     Hyperlipidemia     Pityriasis rosea        PSH:  Past Surgical History:   Procedure Laterality Date    ESOPHAGEAL DILATION      ESOPHAGOGASTRODUODENOSCOPY N/A 07/24/2021    Procedure: EGD (ESOPHAGOGASTRODUODENOSCOPY);  Surgeon: Felipe Briscoe III, MD;  Location: [facility];  Service: Endoscopy;  Laterality: N/A;    LUNG SURGERY      age 12, partial pneumonectomy       Family History:  Family History   Problem Relation Age of Onset    Diabetes Mother     Heart disease Mother     Intracerebral hemorrhage Father        Social History:  Social History     Tobacco Use    Smoking status: Never Smoker    Smokeless tobacco: Never Used   Substance Use Topics    Alcohol use: Yes     Comment: rare    Drug use: No        Review of Systems:  General: No fever, chills  Skin: No rashes  Chest:  Denies cough and sputum production  Heart: Denies chest pain  Resp: Denies dyspnea  Abdomen: Denies diarrhea, abdominal pain, hematemesis, or blood in stool.  Musculoskeletal: No joint stiffness or swelling. Denies back pain.  : see HPI  Neuro: no dizziness or  weakness    Allergies:  Patient has no known allergies.    Medications:    Current Outpatient Medications:     atorvastatin (LIPITOR) 40 MG tablet, Take 1 tablet (40 mg total) by mouth once daily., Disp: 90 tablet, Rfl: 3    sildenafiL (VIAGRA) 100 MG tablet, Take 1 tablet (100 mg total) by mouth daily as needed for Erectile Dysfunction., Disp: 10 tablet, Rfl: 11    Physical Exam:  There were no vitals filed for this visit.  Body mass index is 25.97 kg/m².  General: awake, alert, cooperative  Head: NC/AT  Ears: external ears normal  Eyes: sclera normal  Lungs: normal inspiration, NAD  Heart: well-perfused  Abdomen: Soft, NT, ND  : Normal circ'd phallus, meatus normal in size and position, BL testicles palpable, no masses, nontender, no abnormalities of epididymi  BOO: Normal rectal tone, no hemorrhoids. Prostate smooth and normal, no nodules 50 gm SV not palpable. Perineum and anus normal.  Lymphatic: groin nodes negative  Skin: The skin is warm and dry  Ext: No c/c/e.  Neuro: grossly intact, AOx3    RADIOLOGY:  No recent relevant imaging available for review.    LABS:  I personally reviewed the following lab values:  Lab Results   Component Value Date    WBC 5.55 05/02/2022    HGB 12.8 (L) 05/02/2022    HCT 42.3 05/02/2022     05/02/2022     05/02/2022    K 3.7 05/02/2022     05/02/2022    CREATININE 0.9 05/02/2022    BUN 10 05/02/2022    CO2 27 05/02/2022    TSH 0.869 10/28/2009    PSA 5.8 (H) 05/02/2022    CHOL 287 (H) 05/02/2022    TRIG 84 05/02/2022    HDL 66 05/02/2022    ALT 14 05/02/2022    AST 18 05/02/2022       Assessment/Plan:   Morris Parada is a 66 y.o. male with:    Elevated PSA - I discussed the significance and etiology of elevated PSA noting that prostate cancer is the most concerning reason for elevation of PSA. I explained that the only way of diagnosing prostate cancer is with a prostate biopsy, and I reviewed the biopsy procedure in detail.  I reviewed risks of the  procedure including pain, bleeding, infection.  I quoted a risk of overt sepsis at 0.05 % but quoted a risk of infection requiring IV antibiotics of 2-5%. Obtain MRI and f/u for prostate biopsy.    BPH - not interested in medications currently    ED - PRN viagra    Thank you for allowing me the opportunity to participate in this patient's care.     Neal Sanchez MD  Urology

## 2022-07-01 ENCOUNTER — LAB VISIT (OUTPATIENT)
Dept: LAB | Facility: HOSPITAL | Age: 67
End: 2022-07-01
Attending: INTERNAL MEDICINE
Payer: MEDICARE

## 2022-07-01 DIAGNOSIS — D64.9 ANEMIA, UNSPECIFIED TYPE: ICD-10-CM

## 2022-07-01 LAB
BASOPHILS # BLD AUTO: 0.04 K/UL (ref 0–0.2)
BASOPHILS NFR BLD: 0.8 % (ref 0–1.9)
DIFFERENTIAL METHOD: ABNORMAL
EOSINOPHIL # BLD AUTO: 0.5 K/UL (ref 0–0.5)
EOSINOPHIL NFR BLD: 9 % (ref 0–8)
ERYTHROCYTE [DISTWIDTH] IN BLOOD BY AUTOMATED COUNT: 14 % (ref 11.5–14.5)
HCT VFR BLD AUTO: 41.9 % (ref 40–54)
HGB BLD-MCNC: 13.2 G/DL (ref 14–18)
IMM GRANULOCYTES # BLD AUTO: 0.01 K/UL (ref 0–0.04)
IMM GRANULOCYTES NFR BLD AUTO: 0.2 % (ref 0–0.5)
IRON SERPL-MCNC: 68 UG/DL (ref 45–160)
LYMPHOCYTES # BLD AUTO: 1.8 K/UL (ref 1–4.8)
LYMPHOCYTES NFR BLD: 35.8 % (ref 18–48)
MCH RBC QN AUTO: 27.8 PG (ref 27–31)
MCHC RBC AUTO-ENTMCNC: 31.5 G/DL (ref 32–36)
MCV RBC AUTO: 88 FL (ref 82–98)
MONOCYTES # BLD AUTO: 0.5 K/UL (ref 0.3–1)
MONOCYTES NFR BLD: 9.2 % (ref 4–15)
NEUTROPHILS # BLD AUTO: 2.3 K/UL (ref 1.8–7.7)
NEUTROPHILS NFR BLD: 45 % (ref 38–73)
NRBC BLD-RTO: 0 /100 WBC
PLATELET # BLD AUTO: 184 K/UL (ref 150–450)
PMV BLD AUTO: 9.7 FL (ref 9.2–12.9)
RBC # BLD AUTO: 4.74 M/UL (ref 4.6–6.2)
SATURATED IRON: 22 % (ref 20–50)
TOTAL IRON BINDING CAPACITY: 312 UG/DL (ref 250–450)
TRANSFERRIN SERPL-MCNC: 211 MG/DL (ref 200–375)
WBC # BLD AUTO: 5.11 K/UL (ref 3.9–12.7)

## 2022-07-01 PROCEDURE — 84466 ASSAY OF TRANSFERRIN: CPT | Performed by: INTERNAL MEDICINE

## 2022-07-01 PROCEDURE — 84165 PROTEIN E-PHORESIS SERUM: CPT | Performed by: INTERNAL MEDICINE

## 2022-07-01 PROCEDURE — 84165 PATHOLOGIST INTERPRETATION SPE: ICD-10-PCS | Mod: 26,,, | Performed by: PATHOLOGY

## 2022-07-01 PROCEDURE — 83520 IMMUNOASSAY QUANT NOS NONAB: CPT | Performed by: INTERNAL MEDICINE

## 2022-07-01 PROCEDURE — 36415 COLL VENOUS BLD VENIPUNCTURE: CPT | Mod: PO | Performed by: INTERNAL MEDICINE

## 2022-07-01 PROCEDURE — 84165 PROTEIN E-PHORESIS SERUM: CPT | Mod: 26,,, | Performed by: PATHOLOGY

## 2022-07-01 PROCEDURE — 85025 COMPLETE CBC W/AUTO DIFF WBC: CPT | Performed by: INTERNAL MEDICINE

## 2022-07-05 ENCOUNTER — TELEPHONE (OUTPATIENT)
Dept: PREADMISSION TESTING | Facility: HOSPITAL | Age: 67
End: 2022-07-05
Payer: MEDICARE

## 2022-07-05 ENCOUNTER — ANESTHESIA EVENT (OUTPATIENT)
Dept: ENDOSCOPY | Facility: HOSPITAL | Age: 67
End: 2022-07-05
Payer: MEDICARE

## 2022-07-05 LAB
ALBUMIN SERPL ELPH-MCNC: 4.1 G/DL (ref 3.35–5.55)
ALPHA1 GLOB SERPL ELPH-MCNC: 0.25 G/DL (ref 0.17–0.41)
ALPHA2 GLOB SERPL ELPH-MCNC: 0.54 G/DL (ref 0.43–0.99)
B-GLOBULIN SERPL ELPH-MCNC: 0.71 G/DL (ref 0.5–1.1)
GAMMA GLOB SERPL ELPH-MCNC: 1.59 G/DL (ref 0.67–1.58)
KAPPA LC SER QL IA: 2.08 MG/DL (ref 0.33–1.94)
KAPPA LC/LAMBDA SER IA: 1.28 (ref 0.26–1.65)
LAMBDA LC SER QL IA: 1.63 MG/DL (ref 0.57–2.63)
PATHOLOGIST INTERPRETATION SPE: NORMAL
PROT SERPL-MCNC: 7.2 G/DL (ref 6–8.4)

## 2022-07-05 NOTE — TELEPHONE ENCOUNTER
PAT call completed.  Patient educated on procedure instructions.  Medical history discussed and patient informed of arrival time of 8:30 AM on Wednesday, July 6, 2022 at the [facility], and was made aware of the limited-visitor policy, and that  is to remain during the entire visit.  All questions and concerns addressed.  Endoscopy instructions reviewed. Instructed nothing to eat or drink after midnight the night before procedure. Pre-procedure covid testing not needed, patient is covid vaccinated.  Patient verbalized understanding of all instructions.

## 2022-07-05 NOTE — PRE-PROCEDURE INSTRUCTIONS
PAT call completed.  Patient educated on procedure instructions.  Medical history discussed and patient informed of arrival time of 8:30 AM on Wednesday, July 6, 2022 at the [facility], and was made aware of the limited-visitor policy, and that  is to remain during the entire visit.  All questions and concerns addressed.  Endoscopy instructions reviewed. Instructed nothing to eat or drink after midnight the night before procedure. Pre-procedure covid testing not needed, patient is covid vaccinated.  Patient verbalized understanding of all instructions.

## 2022-07-05 NOTE — ANESTHESIA PREPROCEDURE EVALUATION
07/05/2022  Morris Parada is a 66 y.o., male.  Past Medical History:   Diagnosis Date    Esophageal diverticulum     Helicobacter pylori ab+     Hyperlipidemia     Pityriasis rosea      Past Surgical History:   Procedure Laterality Date    ESOPHAGEAL DILATION      ESOPHAGOGASTRODUODENOSCOPY N/A 07/24/2021    Procedure: EGD (ESOPHAGOGASTRODUODENOSCOPY);  Surgeon: Felipe Briscoe III, MD;  Location: [facility];  Service: Endoscopy;  Laterality: N/A;    LUNG SURGERY      age 12, partial pneumonectomy           Pre-op Assessment    I have reviewed the Patient Summary Reports.     I have reviewed the Nursing Notes. I have reviewed the NPO Status.   I have reviewed the Medications.     Review of Systems  Anesthesia Hx:  No problems with previous Anesthesia  Denies Family Hx of Anesthesia complications.   Denies Personal Hx of Anesthesia complications.   Social:  Alcohol Use, Non-Smoker    Hematology/Oncology:  Hematology Normal   Oncology Normal     Cardiovascular:   Denies MI.  Denies CAD.     Denies Angina. hyperlipidemia 2021 EKG NSR, LAE    Pulmonary:  Pulmonary Normal    Renal/:  Renal/ Normal     Hepatic/GI:   Dysphagia, esophageal stenosis    Neurological:  Neurology Normal    Endocrine:  Endocrine Normal    Psych:  Psychiatric Normal           Physical Exam  General: Well nourished, Cooperative, Alert and Oriented    Airway:  Mallampati: II   Mouth Opening: Normal  TM Distance: Normal  Tongue: Normal  Neck ROM: Normal ROM    Dental:  Intact    Chest/Lungs:  Clear to auscultation, Normal Respiratory Rate    Heart:  Rate: Normal  Rhythm: Regular Rhythm        Anesthesia Plan  Type of Anesthesia, risks & benefits discussed:    Anesthesia Type: Gen Natural Airway  Intra-op Monitoring Plan: Standard ASA Monitors  Post Op Pain Control Plan: multimodal analgesia  Induction:  IV  Informed Consent:  Informed consent signed with the Patient and all parties understand the risks and agree with anesthesia plan.  All questions answered. Patient consented to blood products? No  ASA Score: 2  Day of Surgery Review of History & Physical: H&P Update referred to the surgeon/provider.    Ready For Surgery From Anesthesia Perspective.     .

## 2022-07-06 ENCOUNTER — PATIENT MESSAGE (OUTPATIENT)
Dept: PRIMARY CARE CLINIC | Facility: CLINIC | Age: 67
End: 2022-07-06
Payer: MEDICARE

## 2022-07-06 ENCOUNTER — HOSPITAL ENCOUNTER (OUTPATIENT)
Facility: HOSPITAL | Age: 67
Discharge: HOME | End: 2022-07-06
Attending: INTERNAL MEDICINE | Admitting: INTERNAL MEDICINE
Payer: MEDICARE

## 2022-07-06 ENCOUNTER — ANESTHESIA (OUTPATIENT)
Dept: ENDOSCOPY | Facility: HOSPITAL | Age: 67
End: 2022-07-06
Payer: MEDICARE

## 2022-07-06 VITALS
WEIGHT: 179.88 LBS | TEMPERATURE: 98 F | DIASTOLIC BLOOD PRESSURE: 93 MMHG | OXYGEN SATURATION: 99 % | RESPIRATION RATE: 17 BRPM | HEIGHT: 70 IN | SYSTOLIC BLOOD PRESSURE: 149 MMHG | HEART RATE: 64 BPM | BODY MASS INDEX: 25.75 KG/M2

## 2022-07-06 DIAGNOSIS — D64.9 ANEMIA, UNSPECIFIED TYPE: Primary | ICD-10-CM

## 2022-07-06 PROCEDURE — D9220A PRA ANESTHESIA: ICD-10-PCS | Mod: CRNA,,, | Performed by: NURSE ANESTHETIST, CERTIFIED REGISTERED

## 2022-07-06 PROCEDURE — 43235 EGD DIAGNOSTIC BRUSH WASH: CPT | Mod: ,,, | Performed by: INTERNAL MEDICINE

## 2022-07-06 PROCEDURE — D9220A PRA ANESTHESIA: Mod: CRNA,,, | Performed by: NURSE ANESTHETIST, CERTIFIED REGISTERED

## 2022-07-06 PROCEDURE — 43235 PR EGD, FLEX, DIAGNOSTIC: ICD-10-PCS | Mod: ,,, | Performed by: INTERNAL MEDICINE

## 2022-07-06 PROCEDURE — 37000008 HC ANESTHESIA 1ST 15 MINUTES: Performed by: INTERNAL MEDICINE

## 2022-07-06 PROCEDURE — 43235 EGD DIAGNOSTIC BRUSH WASH: CPT | Performed by: INTERNAL MEDICINE

## 2022-07-06 PROCEDURE — 63600175 PHARM REV CODE 636 W HCPCS: Performed by: INTERNAL MEDICINE

## 2022-07-06 PROCEDURE — 63600175 PHARM REV CODE 636 W HCPCS: Performed by: NURSE ANESTHETIST, CERTIFIED REGISTERED

## 2022-07-06 PROCEDURE — D9220A PRA ANESTHESIA: ICD-10-PCS | Mod: ANES,,, | Performed by: ANESTHESIOLOGY

## 2022-07-06 PROCEDURE — 37000009 HC ANESTHESIA EA ADD 15 MINS: Performed by: INTERNAL MEDICINE

## 2022-07-06 PROCEDURE — 25000003 PHARM REV CODE 250: Performed by: INTERNAL MEDICINE

## 2022-07-06 PROCEDURE — 91037 ESOPH IMPED FUNCTION TEST: CPT | Mod: TC | Performed by: INTERNAL MEDICINE

## 2022-07-06 PROCEDURE — 91010 ESOPHAGUS MOTILITY STUDY: CPT | Mod: TC | Performed by: INTERNAL MEDICINE

## 2022-07-06 PROCEDURE — 25000003 PHARM REV CODE 250: Performed by: NURSE ANESTHETIST, CERTIFIED REGISTERED

## 2022-07-06 PROCEDURE — D9220A PRA ANESTHESIA: Mod: ANES,,, | Performed by: ANESTHESIOLOGY

## 2022-07-06 RX ORDER — PROPOFOL 10 MG/ML
VIAL (ML) INTRAVENOUS
Status: DISCONTINUED | OUTPATIENT
Start: 2022-07-06 | End: 2022-07-06

## 2022-07-06 RX ORDER — FENTANYL CITRATE 50 UG/ML
INJECTION, SOLUTION INTRAMUSCULAR; INTRAVENOUS
Status: DISCONTINUED | OUTPATIENT
Start: 2022-07-06 | End: 2022-07-06

## 2022-07-06 RX ORDER — LIDOCAINE HYDROCHLORIDE 20 MG/ML
INJECTION, SOLUTION EPIDURAL; INFILTRATION; INTRACAUDAL; PERINEURAL
Status: DISCONTINUED | OUTPATIENT
Start: 2022-07-06 | End: 2022-07-06

## 2022-07-06 RX ORDER — LIDOCAINE HYDROCHLORIDE 20 MG/ML
2 SOLUTION OROPHARYNGEAL ONCE
Status: COMPLETED | OUTPATIENT
Start: 2022-07-06 | End: 2022-07-06

## 2022-07-06 RX ORDER — SODIUM CHLORIDE, SODIUM LACTATE, POTASSIUM CHLORIDE, CALCIUM CHLORIDE 600; 310; 30; 20 MG/100ML; MG/100ML; MG/100ML; MG/100ML
INJECTION, SOLUTION INTRAVENOUS CONTINUOUS
Status: DISCONTINUED | OUTPATIENT
Start: 2022-07-06 | End: 2022-07-06 | Stop reason: HOSPADM

## 2022-07-06 RX ADMIN — PROPOFOL 100 MG: 10 INJECTION, EMULSION INTRAVENOUS at 10:07

## 2022-07-06 RX ADMIN — FENTANYL CITRATE 50 MCG: 50 INJECTION, SOLUTION INTRAMUSCULAR; INTRAVENOUS at 10:07

## 2022-07-06 RX ADMIN — LIDOCAINE HYDROCHLORIDE 100 MG: 20 INJECTION, SOLUTION EPIDURAL; INFILTRATION; INTRACAUDAL; PERINEURAL at 10:07

## 2022-07-06 RX ADMIN — SODIUM CHLORIDE, SODIUM LACTATE, POTASSIUM CHLORIDE, AND CALCIUM CHLORIDE: 600; 310; 30; 20 INJECTION, SOLUTION INTRAVENOUS at 10:07

## 2022-07-06 RX ADMIN — LIDOCAINE HYDROCHLORIDE 2 ML: 20 SOLUTION ORAL; TOPICAL at 09:07

## 2022-07-06 NOTE — ANESTHESIA POSTPROCEDURE EVALUATION
Anesthesia Post Evaluation    Patient: Morris Parada    Procedure(s) Performed: Procedure(s) (LRB):  EGD (ESOPHAGOGASTRODUODENOSCOPY) (N/A)  MOTILITY STUDY, ESOPHAGUS, USING HIGH RESOLUTION MANOMETRY (N/A)    Final Anesthesia Type: general      Patient location during evaluation: PACU  Patient participation: Yes- Able to Participate  Level of consciousness: awake and alert and oriented  Post-procedure vital signs: reviewed and stable  Pain management: adequate  Airway patency: patent    PONV status at discharge: No PONV  Anesthetic complications: no      Cardiovascular status: blood pressure returned to baseline, stable and hemodynamically stable  Respiratory status: unassisted  Hydration status: euvolemic  Follow-up not needed.          Vitals Value Taken Time   /92 07/06/22 1058     07/06/22 1203   Pulse 61 07/06/22 1057   Resp 17 07/06/22 1057   SpO2 100 % 07/06/22 1057   Vitals shown include unvalidated device data.      Event Time   Out of Recovery 11:00:57         Pain/Re Score: Re Score: 10 (7/6/2022 10:55 AM)

## 2022-07-06 NOTE — PLAN OF CARE
Pt has met discharge criteria. Pt and wife verbalized understanding of discharge instructions. Written instructions given to wife.

## 2022-07-06 NOTE — PROVATION PATIENT INSTRUCTIONS
Discharge Summary/Instructions after an Endoscopic Procedure  Patient Name: Morris Parada  Patient MRN: [id number]  Patient YOB: 1955 Wednesday, July 6, 2022  Jaqui Shelton MD  Dear patient,  As a result of recent federal legislation (The Federal Cures Act), you may   receive lab or pathology results from your procedure in your [facility]   account before your physician is able to contact you. Your physician or   their representative will relay the results to you with their   recommendations at their soonest availability.  Thank you,  RESTRICTIONS:  During your procedure today, you received medications for sedation.  These   medications may affect your judgment, balance and coordination.  Therefore,   for 24 hours, you have the following restrictions:   - DO NOT drive a car, operate machinery, make legal/financial decisions,   sign important papers or drink alcohol.    ACTIVITY:  Today: no heavy lifting, straining or running due to procedural   sedation/anesthesia.  The following day: return to full activity including work.  DIET:  Eat and drink normally unless instructed otherwise.     TREATMENT FOR COMMON SIDE EFFECTS:  - Mild abdominal pain, nausea, belching, bloating or excessive gas:  rest,   eat lightly and use a heating pad.  - Sore Throat: treat with throat lozenges and/or gargle with warm salt   water.  - Because air was used during the procedure, expelling large amounts of air   from your rectum or belching is normal.  - If a bowel prep was taken, you may not have a bowel movement for 1-3 days.    This is normal.  SYMPTOMS TO WATCH FOR AND REPORT TO YOUR PHYSICIAN:  1. Abdominal pain or bloating, other than gas cramps.  2. Chest pain.  3. Back pain.  4. Signs of infection such as: chills or fever occurring within 24 hours   after the procedure.  5. Rectal bleeding, which would show as bright red, maroon, or black stools.   (A tablespoon of blood from the rectum is not serious,  especially if   hemorrhoids are present.)  6. Vomiting.  7. Weakness or dizziness.  GO DIRECTLY TO THE NEAREST EMERGENCY ROOM IF YOU HAVE ANY OF THE FOLLOWING:      Difficulty breathing              Chills and/or fever over 101 F   Persistent vomiting and/or vomiting blood   Severe abdominal pain   Severe chest pain   Black, tarry stools   Bleeding- more than one tablespoon   Any other symptom or condition that you feel may need urgent attention  Your doctor recommends these additional instructions:  If any biopsies were taken, your doctors clinic will contact you in 1 to 2   weeks with any results.  - Discharge patient to home.   - Resume previous diet.   - Continue present medications.   - Await HREM results. If consisent with achalasia will need to consider   repeat myotomy versus POEM pending results of the HREM  For questions, problems or results please call your physician Jaqui Shelton MD at Work:  [phone]  If you have any questions about the above instructions, call the GI   department at [phone] or call the endoscopy unit at [phone]   from 7am until 3 pm.  [facility] [place], EMERGENCY ROOM PHONE NUMBER:   [phone]  IF A COMPLICATION OR EMERGENCY SITUATION ARISES AND YOU ARE UNABLE TO REACH   YOUR PHYSICIAN - GO DIRECTLY TO THE EMERGENCY ROOM.  I have read or have had read to me these discharge instructions for my   procedure and have received a written copy.  I understand these   instructions and will follow-up with my physician if I have any questions.     __________________________________       _____________________________________  Nurse Signature                                          Patient/Designated   Responsible Party Signature  MD Jaqui Díaz MD  7/6/2022 10:34:08 AM  This report has been verified and signed electronically.  Dear patient,  As a result of recent federal legislation (The Federal Cures  Act), you may   receive lab or pathology results from your procedure in your [facility]   account before your physician is able to contact you. Your physician or   their representative will relay the results to you with their   recommendations at their soonest availability.  Thank you,  PROVATION

## 2022-07-06 NOTE — TRANSFER OF CARE
"Anesthesia Transfer of Care Note    Patient: Morris Parada    Procedure(s) Performed: Procedure(s) (LRB):  EGD (ESOPHAGOGASTRODUODENOSCOPY) (N/A)  MOTILITY STUDY, ESOPHAGUS, USING HIGH RESOLUTION MANOMETRY (N/A)    Patient location: PACU    Anesthesia Type: general    Transport from OR: Transported from OR on room air with adequate spontaneous ventilation    Post pain: adequate analgesia    Post assessment: no apparent anesthetic complications    Post vital signs: stable    Level of consciousness: sedated and responds to stimulation    Nausea/Vomiting: no nausea/vomiting    Complications: none    Transfer of care protocol was followed      Last vitals:   Visit Vitals  /77 (BP Location: Left arm, Patient Position: Sitting)   Pulse 65   Temp 36.6 °C (97.8 °F) (Temporal)   Resp 16   Ht 5' 10" (1.778 m)   Wt 81.6 kg (179 lb 14.3 oz)   SpO2 98%   BMI 25.81 kg/m²     "

## 2022-07-15 PROCEDURE — 91010 ESOPHAGUS MOTILITY STUDY: CPT | Mod: 26,,, | Performed by: INTERNAL MEDICINE

## 2022-07-15 PROCEDURE — 91010 PR ESOPHAGEAL MOTILITY STUDY, MA2METRY: ICD-10-PCS | Mod: 26,,, | Performed by: INTERNAL MEDICINE

## 2022-07-15 PROCEDURE — 91037 ESOPH IMPED FUNCTION TEST: CPT | Mod: 26,,, | Performed by: INTERNAL MEDICINE

## 2022-07-15 PROCEDURE — 91037 PR GERD TST W/ NASAL IMPEDENCE ELECTROD: ICD-10-PCS | Mod: 26,,, | Performed by: INTERNAL MEDICINE

## 2022-07-15 NOTE — PROVATION PATIENT INSTRUCTIONS
Discharge Summary/Instructions after an Endoscopic Procedure  Patient Name: Morris Parada  Patient MRN: [id number]  Patient YOB: 1955 Wednesday, July 6, 2022 Erika Hopkins MD  Dear patient,  As a result of recent federal legislation (The Federal Cures Act), you may   receive lab or pathology results from your procedure in your [facility]   account before your physician is able to contact you. Your physician or   their representative will relay the results to you with their   recommendations at their soonest availability.  Thank you,  RESTRICTIONS:  During your procedure today, you received medications for sedation.  These   medications may affect your judgment, balance and coordination.  Therefore,   for 24 hours, you have the following restrictions:   - DO NOT drive a car, operate machinery, make legal/financial decisions,   sign important papers or drink alcohol.    ACTIVITY:  Today: no heavy lifting, straining or running due to procedural   sedation/anesthesia.  The following day: return to full activity including work.  DIET:  Eat and drink normally unless instructed otherwise.     TREATMENT FOR COMMON SIDE EFFECTS:  - Mild abdominal pain, nausea, belching, bloating or excessive gas:  rest,   eat lightly and use a heating pad.  - Sore Throat: treat with throat lozenges and/or gargle with warm salt   water.  - Because air was used during the procedure, expelling large amounts of air   from your rectum or belching is normal.  - If a bowel prep was taken, you may not have a bowel movement for 1-3 days.    This is normal.  SYMPTOMS TO WATCH FOR AND REPORT TO YOUR PHYSICIAN:  1. Abdominal pain or bloating, other than gas cramps.  2. Chest pain.  3. Back pain.  4. Signs of infection such as: chills or fever occurring within 24 hours   after the procedure.  5. Rectal bleeding, which would show as bright red, maroon, or black stools.   (A tablespoon of blood from the rectum is not serious, especially if    hemorrhoids are present.)  6. Vomiting.  7. Weakness or dizziness.  GO DIRECTLY TO THE NEAREST EMERGENCY ROOM IF YOU HAVE ANY OF THE FOLLOWING:      Difficulty breathing              Chills and/or fever over 101 F   Persistent vomiting and/or vomiting blood   Severe abdominal pain   Severe chest pain   Black, tarry stools   Bleeding- more than one tablespoon   Any other symptom or condition that you feel may need urgent attention  Your doctor recommends these additional instructions:  If any biopsies were taken, your doctors clinic will contact you in 1 to 2   weeks with any results.   Per referring doctor  Consider referral to motility specialist.  For questions, problems or results please call your physician Erika Hopkins MD at Work:  [phone]  If you have any questions about the above instructions, call the GI   department at [phone] or call the endoscopy unit at [phone]   from 7am until 3 pm.  [facility] [place] [facility], EMERGENCY ROOM PHONE NUMBER:   [phone]  IF A COMPLICATION OR EMERGENCY SITUATION ARISES AND YOU ARE UNABLE TO REACH   YOUR PHYSICIAN - GO DIRECTLY TO THE EMERGENCY ROOM.  I have read or have had read to me these discharge instructions for my   procedure and have received a written copy.  I understand these   instructions and will follow-up with my physician if I have any questions.     __________________________________       _____________________________________  Nurse Signature                                          Patient/Designated   Responsible Party Signature  MD Erika Jarrell MD  7/15/2022 4:24:27 PM  This report has been verified and signed electronically.  Dear patient,  As a result of recent federal legislation (The Federal Cures Act), you may   receive lab or pathology results from your procedure in your [facility]   account before your physician is able to contact you. Your physician or   their representative will relay the  results to you with their   recommendations at their soonest availability.  Thank you,  PROVATION

## 2022-07-18 ENCOUNTER — TELEPHONE (OUTPATIENT)
Dept: GASTROENTEROLOGY | Facility: CLINIC | Age: 67
End: 2022-07-18
Payer: MEDICARE

## 2022-07-18 NOTE — TELEPHONE ENCOUNTER
Dr. Briscoe.    EGD and manoemtry are conficting.  Pt may benefit from timed barium swallow +/- EndoFlip. I would be happy to see the pt and determine the next step in his evaluation.    I will ask my team to arrange new pt apt.   Thanks,   ML

## 2022-07-18 NOTE — TELEPHONE ENCOUNTER
----- Message from Felipe Briscoe III, MD sent at 7/18/2022 11:12 AM CDT -----  Regarding: FW: Manometry  Would you review and give an idea of next, if any considerations for this patient. Would be happy to refer your way for your input. Thanks, GH  ----- Message -----  From: Amando Estrada RN  Sent: 7/18/2022   9:19 AM CDT  To: Felipe Briscoe III, MD, #  Subject: Manometry                                        Manometry report available for review. Thank you.

## 2022-07-19 ENCOUNTER — TELEPHONE (OUTPATIENT)
Dept: GASTROENTEROLOGY | Facility: CLINIC | Age: 67
End: 2022-07-19
Payer: MEDICARE

## 2022-07-19 ENCOUNTER — PATIENT MESSAGE (OUTPATIENT)
Dept: GASTROENTEROLOGY | Facility: CLINIC | Age: 67
End: 2022-07-19
Payer: MEDICARE

## 2022-07-19 RX ORDER — SODIUM, POTASSIUM,MAG SULFATES 17.5-3.13G
1 SOLUTION, RECONSTITUTED, ORAL ORAL DAILY
Qty: 1 KIT | Refills: 0 | Status: SHIPPED | OUTPATIENT
Start: 2022-07-19 | End: 2022-07-21

## 2022-07-19 NOTE — TELEPHONE ENCOUNTER
Called and discussed with pt that Dr. Chauhan is a consultant that does not accept patients as a primary GI provider.  Discussed that she will send them back to their primary GI provider once their symptoms improved or the plan of care is established.     Pt was told the logistics of the appointment (arrival time, length of visit, total time spent at facility).     Pt was also told that Dr. Chauhan will review their previous workup but may order additional test and perform her own procedures and that this may require an overnight stay at a local hotel to complete the workup. Patient agreed and verbalized understanding.     Explained we have ~ 3 mos wait for NP appt.    Pt says is familiar with location of [facility].

## 2022-08-03 ENCOUNTER — TELEPHONE (OUTPATIENT)
Dept: UROLOGY | Facility: CLINIC | Age: 67
End: 2022-08-03
Payer: MEDICARE

## 2022-08-03 DIAGNOSIS — R97.20 ELEVATED PSA: Primary | ICD-10-CM

## 2022-08-03 NOTE — TELEPHONE ENCOUNTER
----- Message from Marta Ag RT sent at 8/3/2022  2:17 PM CDT -----  Regarding: ASAP Creatinine  Please order and schedule ASAP Creat one hour prior to the MRI on 08/11/2022 at 10:30 am.    Thanks

## 2022-08-11 ENCOUNTER — HOSPITAL ENCOUNTER (OUTPATIENT)
Dept: RADIOLOGY | Facility: HOSPITAL | Age: 67
Discharge: HOME | End: 2022-08-11
Attending: UROLOGY
Payer: MEDICARE

## 2022-08-11 DIAGNOSIS — R97.20 ELEVATED PSA: ICD-10-CM

## 2022-08-11 PROCEDURE — 25500020 PHARM REV CODE 255: Performed by: UROLOGY

## 2022-08-11 PROCEDURE — 72197 MRI PELVIS W/O & W/DYE: CPT | Mod: 26,,, | Performed by: RADIOLOGY

## 2022-08-11 PROCEDURE — A9585 GADOBUTROL INJECTION: HCPCS | Performed by: UROLOGY

## 2022-08-11 PROCEDURE — 72197 MRI PROSTATE W W/O CONTRAST: ICD-10-PCS | Mod: 26,,, | Performed by: RADIOLOGY

## 2022-08-11 PROCEDURE — 72197 MRI PELVIS W/O & W/DYE: CPT | Mod: TC

## 2022-08-11 RX ORDER — GADOBUTROL 604.72 MG/ML
8 INJECTION INTRAVENOUS
Status: COMPLETED | OUTPATIENT
Start: 2022-08-11 | End: 2022-08-11

## 2022-08-11 RX ADMIN — GADOBUTROL 8 ML: 604.72 INJECTION INTRAVENOUS at 11:08

## 2022-08-16 ENCOUNTER — PROCEDURE VISIT (OUTPATIENT)
Dept: UROLOGY | Facility: CLINIC | Age: 67
End: 2022-08-16
Payer: MEDICARE

## 2022-08-16 VITALS
SYSTOLIC BLOOD PRESSURE: 124 MMHG | HEART RATE: 77 BPM | DIASTOLIC BLOOD PRESSURE: 80 MMHG | WEIGHT: 179 LBS | HEIGHT: 70 IN | TEMPERATURE: 98 F | BODY MASS INDEX: 25.62 KG/M2

## 2022-08-16 DIAGNOSIS — R97.20 ELEVATED PSA: Primary | ICD-10-CM

## 2022-08-16 PROCEDURE — 88342 CHG IMMUNOCYTOCHEMISTRY: ICD-10-PCS | Mod: 26,,, | Performed by: STUDENT IN AN ORGANIZED HEALTH CARE EDUCATION/TRAINING PROGRAM

## 2022-08-16 PROCEDURE — 76872 US TRANSRECTAL: CPT | Mod: 26,S$GLB,, | Performed by: UROLOGY

## 2022-08-16 PROCEDURE — G0416 PROSTATE BIOPSY, ANY MTHD: ICD-10-PCS | Mod: 26,,, | Performed by: STUDENT IN AN ORGANIZED HEALTH CARE EDUCATION/TRAINING PROGRAM

## 2022-08-16 PROCEDURE — G0416 PROSTATE BIOPSY, ANY MTHD: HCPCS | Mod: 26,,, | Performed by: STUDENT IN AN ORGANIZED HEALTH CARE EDUCATION/TRAINING PROGRAM

## 2022-08-16 PROCEDURE — 88341 IMHCHEM/IMCYTCHM EA ADD ANTB: CPT | Performed by: STUDENT IN AN ORGANIZED HEALTH CARE EDUCATION/TRAINING PROGRAM

## 2022-08-16 PROCEDURE — 88342 IMHCHEM/IMCYTCHM 1ST ANTB: CPT | Mod: 26,,, | Performed by: STUDENT IN AN ORGANIZED HEALTH CARE EDUCATION/TRAINING PROGRAM

## 2022-08-16 PROCEDURE — 76872 PR US TRANSRECTAL: ICD-10-PCS | Mod: 26,S$GLB,, | Performed by: UROLOGY

## 2022-08-16 PROCEDURE — 96372 PR INJECTION,THERAP/PROPH/DIAG2ST, IM OR SUBCUT: ICD-10-PCS | Mod: 59,S$GLB,, | Performed by: UROLOGY

## 2022-08-16 PROCEDURE — 55700 PR BIOPSY OF PROSTATE,NEEDLE/PUNCH: ICD-10-PCS | Mod: S$GLB,,, | Performed by: UROLOGY

## 2022-08-16 PROCEDURE — 96372 THER/PROPH/DIAG INJ SC/IM: CPT | Mod: 59,S$GLB,, | Performed by: UROLOGY

## 2022-08-16 PROCEDURE — 88342 IMHCHEM/IMCYTCHM 1ST ANTB: CPT | Mod: 59 | Performed by: STUDENT IN AN ORGANIZED HEALTH CARE EDUCATION/TRAINING PROGRAM

## 2022-08-16 PROCEDURE — 88341 PR IHC OR ICC EACH ADD'L SINGLE ANTIBODY  STAINPR: ICD-10-PCS | Mod: 26,,, | Performed by: STUDENT IN AN ORGANIZED HEALTH CARE EDUCATION/TRAINING PROGRAM

## 2022-08-16 PROCEDURE — 88305 TISSUE EXAM BY PATHOLOGIST: CPT | Mod: 59 | Performed by: STUDENT IN AN ORGANIZED HEALTH CARE EDUCATION/TRAINING PROGRAM

## 2022-08-16 PROCEDURE — 88341 IMHCHEM/IMCYTCHM EA ADD ANTB: CPT | Mod: 26,,, | Performed by: STUDENT IN AN ORGANIZED HEALTH CARE EDUCATION/TRAINING PROGRAM

## 2022-08-16 PROCEDURE — 55700 PR BIOPSY OF PROSTATE,NEEDLE/PUNCH: CPT | Mod: S$GLB,,, | Performed by: UROLOGY

## 2022-08-16 RX ORDER — CEFTRIAXONE 1 G/1
1 INJECTION, POWDER, FOR SOLUTION INTRAMUSCULAR; INTRAVENOUS
Status: COMPLETED | OUTPATIENT
Start: 2022-08-16 | End: 2022-08-16

## 2022-08-16 RX ADMIN — CEFTRIAXONE 1 G: 1 INJECTION, POWDER, FOR SOLUTION INTRAMUSCULAR; INTRAVENOUS at 09:08

## 2022-08-16 NOTE — PROGRESS NOTES
.Per Dr. Sanchez IM rocephin was given to pt via right ventrogluteal using aseptic technique. Pt tolerated well. Pt instructed to remain in clinic for 15 minutes after injection to monitor for signs & symptoms of adverse reaction. Pt verbalized understanding.     Loraine Milanes RN

## 2022-08-16 NOTE — PROCEDURES
Procedures     CC: elevated PSA    HPI: 68 yo M with history of elevated PSA and no lesions on MRI presents for biopsy    Procedure: (1) Transrectal Prostate Biopsy                      (2) Transrectal ultrasound of prostate                     (3) Ultrasound Guidance of Prostate Biopsy needles    Detail: After proper consents were obtained, the patient was prepped and draped in normal fashion in the left lateral decubitus position for TRUS/Bx.  5 ml of lidocaine jelly was instilled in the rectum.  The U/S with rectal probe was used to size the prostate.  A spinal needle was used and 20ml of 1% lidocaine was instilled on the side of the prostate at the base of the seminal vesicles.  Biopsy was then performed using an 18Ga biopsy needle directed at the base, mid and apex bilaterally for a total of 12 cores. Antibiotic prophylaxis was provided using IM rocephin.    Findings: The prostate is 61W, 38H, and 55L for volume of 67 grams    Impression/Plan:  - will call with path  - blood per rectum, in urine, and in ejaculate are common  - instructed to present to ED for fevers >101F, inability to void, or other issues    Neal Sanchez MD

## 2022-08-22 ENCOUNTER — PATIENT MESSAGE (OUTPATIENT)
Dept: UROLOGY | Facility: CLINIC | Age: 67
End: 2022-08-22
Payer: MEDICARE

## 2022-08-22 LAB
COMMENT: NORMAL
FINAL PATHOLOGIC DIAGNOSIS: NORMAL
GROSS: NORMAL
Lab: NORMAL
MICROSCOPIC EXAM: NORMAL

## 2022-08-30 ENCOUNTER — TELEPHONE (OUTPATIENT)
Dept: UROLOGY | Facility: CLINIC | Age: 67
End: 2022-08-30
Payer: MEDICARE

## 2022-08-30 ENCOUNTER — PATIENT MESSAGE (OUTPATIENT)
Dept: INTERNAL MEDICINE | Facility: CLINIC | Age: 67
End: 2022-08-30
Payer: MEDICARE

## 2022-08-30 NOTE — TELEPHONE ENCOUNTER
----- Message from Neal Sanchez MD sent at 8/26/2022  5:03 PM CDT -----  F/u with me for prostate cancer discussion, thanks

## 2022-08-31 ENCOUNTER — PATIENT MESSAGE (OUTPATIENT)
Dept: INTERNAL MEDICINE | Facility: CLINIC | Age: 67
End: 2022-08-31
Payer: MEDICARE

## 2022-09-06 ENCOUNTER — LAB VISIT (OUTPATIENT)
Dept: LAB | Facility: HOSPITAL | Age: 67
End: 2022-09-06
Attending: INTERNAL MEDICINE
Payer: MEDICARE

## 2022-09-06 DIAGNOSIS — D64.9 ANEMIA, UNSPECIFIED TYPE: ICD-10-CM

## 2022-09-06 LAB
BASOPHILS # BLD AUTO: 0.05 K/UL (ref 0–0.2)
BASOPHILS NFR BLD: 0.9 % (ref 0–1.9)
DIFFERENTIAL METHOD: ABNORMAL
EOSINOPHIL # BLD AUTO: 0.5 K/UL (ref 0–0.5)
EOSINOPHIL NFR BLD: 8.1 % (ref 0–8)
ERYTHROCYTE [DISTWIDTH] IN BLOOD BY AUTOMATED COUNT: 14.5 % (ref 11.5–14.5)
HCT VFR BLD AUTO: 38 % (ref 40–54)
HGB BLD-MCNC: 12 G/DL (ref 14–18)
IMM GRANULOCYTES # BLD AUTO: 0.01 K/UL (ref 0–0.04)
IMM GRANULOCYTES NFR BLD AUTO: 0.2 % (ref 0–0.5)
LYMPHOCYTES # BLD AUTO: 1.7 K/UL (ref 1–4.8)
LYMPHOCYTES NFR BLD: 30.5 % (ref 18–48)
MCH RBC QN AUTO: 27.6 PG (ref 27–31)
MCHC RBC AUTO-ENTMCNC: 31.6 G/DL (ref 32–36)
MCV RBC AUTO: 88 FL (ref 82–98)
MONOCYTES # BLD AUTO: 0.6 K/UL (ref 0.3–1)
MONOCYTES NFR BLD: 10.2 % (ref 4–15)
NEUTROPHILS # BLD AUTO: 2.8 K/UL (ref 1.8–7.7)
NEUTROPHILS NFR BLD: 50.1 % (ref 38–73)
NRBC BLD-RTO: 0 /100 WBC
PLATELET # BLD AUTO: 180 K/UL (ref 150–450)
PMV BLD AUTO: 9.5 FL (ref 9.2–12.9)
RBC # BLD AUTO: 4.34 M/UL (ref 4.6–6.2)
WBC # BLD AUTO: 5.67 K/UL (ref 3.9–12.7)

## 2022-09-06 PROCEDURE — 36415 COLL VENOUS BLD VENIPUNCTURE: CPT | Mod: PO | Performed by: INTERNAL MEDICINE

## 2022-09-06 PROCEDURE — 85025 COMPLETE CBC W/AUTO DIFF WBC: CPT | Performed by: INTERNAL MEDICINE

## 2022-09-07 ENCOUNTER — LAB VISIT (OUTPATIENT)
Dept: LAB | Facility: HOSPITAL | Age: 67
End: 2022-09-07
Attending: INTERNAL MEDICINE
Payer: MEDICARE

## 2022-09-07 DIAGNOSIS — D64.9 ANEMIA, UNSPECIFIED TYPE: Primary | ICD-10-CM

## 2022-09-07 DIAGNOSIS — D64.9 ANEMIA, UNSPECIFIED TYPE: ICD-10-CM

## 2022-09-07 LAB
FERRITIN SERPL-MCNC: 198 NG/ML (ref 20–300)
IRON SERPL-MCNC: 84 UG/DL (ref 45–160)
SATURATED IRON: 27 % (ref 20–50)
TOTAL IRON BINDING CAPACITY: 306 UG/DL (ref 250–450)
TRANSFERRIN SERPL-MCNC: 207 MG/DL (ref 200–375)
VIT B12 SERPL-MCNC: 423 PG/ML (ref 210–950)

## 2022-09-07 PROCEDURE — 36415 COLL VENOUS BLD VENIPUNCTURE: CPT | Mod: PO | Performed by: INTERNAL MEDICINE

## 2022-09-07 PROCEDURE — 84165 PATHOLOGIST INTERPRETATION SPE: ICD-10-PCS | Mod: 26,,, | Performed by: PATHOLOGY

## 2022-09-07 PROCEDURE — 84165 PROTEIN E-PHORESIS SERUM: CPT | Mod: 26,,, | Performed by: PATHOLOGY

## 2022-09-07 PROCEDURE — 82728 ASSAY OF FERRITIN: CPT | Performed by: INTERNAL MEDICINE

## 2022-09-07 PROCEDURE — 83520 IMMUNOASSAY QUANT NOS NONAB: CPT | Performed by: INTERNAL MEDICINE

## 2022-09-07 PROCEDURE — 84466 ASSAY OF TRANSFERRIN: CPT | Performed by: INTERNAL MEDICINE

## 2022-09-07 PROCEDURE — 82607 VITAMIN B-12: CPT | Performed by: INTERNAL MEDICINE

## 2022-09-07 PROCEDURE — 84165 PROTEIN E-PHORESIS SERUM: CPT | Performed by: INTERNAL MEDICINE

## 2022-09-08 LAB
KAPPA LC SER QL IA: 1.74 MG/DL (ref 0.33–1.94)
KAPPA LC/LAMBDA SER IA: 1.29 (ref 0.26–1.65)
LAMBDA LC SER QL IA: 1.35 MG/DL (ref 0.57–2.63)

## 2022-09-09 ENCOUNTER — OFFICE VISIT (OUTPATIENT)
Dept: UROLOGY | Facility: CLINIC | Age: 67
End: 2022-09-09
Payer: MEDICARE

## 2022-09-09 ENCOUNTER — PATIENT MESSAGE (OUTPATIENT)
Dept: INTERNAL MEDICINE | Facility: CLINIC | Age: 67
End: 2022-09-09
Payer: MEDICARE

## 2022-09-09 VITALS
DIASTOLIC BLOOD PRESSURE: 74 MMHG | SYSTOLIC BLOOD PRESSURE: 113 MMHG | HEART RATE: 70 BPM | BODY MASS INDEX: 25.69 KG/M2 | WEIGHT: 179 LBS

## 2022-09-09 DIAGNOSIS — D64.9 ANEMIA, UNSPECIFIED TYPE: Primary | ICD-10-CM

## 2022-09-09 DIAGNOSIS — R97.20 ELEVATED PSA: ICD-10-CM

## 2022-09-09 DIAGNOSIS — C61 PROSTATE CANCER: Primary | ICD-10-CM

## 2022-09-09 LAB
ALBUMIN SERPL ELPH-MCNC: 4.14 G/DL (ref 3.35–5.55)
ALPHA1 GLOB SERPL ELPH-MCNC: 0.23 G/DL (ref 0.17–0.41)
ALPHA2 GLOB SERPL ELPH-MCNC: 0.53 G/DL (ref 0.43–0.99)
B-GLOBULIN SERPL ELPH-MCNC: 0.64 G/DL (ref 0.5–1.1)
GAMMA GLOB SERPL ELPH-MCNC: 1.46 G/DL (ref 0.67–1.58)
PATHOLOGIST INTERPRETATION SPE: NORMAL
PROT SERPL-MCNC: 7 G/DL (ref 6–8.4)

## 2022-09-09 PROCEDURE — 99999 PR PBB SHADOW E&M-EST. PATIENT-LVL III: ICD-10-PCS | Mod: PBBFAC,,, | Performed by: UROLOGY

## 2022-09-09 PROCEDURE — 1160F RVW MEDS BY RX/DR IN RCRD: CPT | Mod: CPTII,S$GLB,, | Performed by: UROLOGY

## 2022-09-09 PROCEDURE — 1126F AMNT PAIN NOTED NONE PRSNT: CPT | Mod: CPTII,S$GLB,, | Performed by: UROLOGY

## 2022-09-09 PROCEDURE — 3078F DIAST BP <80 MM HG: CPT | Mod: CPTII,S$GLB,, | Performed by: UROLOGY

## 2022-09-09 PROCEDURE — 3074F PR MOST RECENT SYSTOLIC BLOOD PRESSURE < 130 MM HG: ICD-10-PCS | Mod: CPTII,S$GLB,, | Performed by: UROLOGY

## 2022-09-09 PROCEDURE — 3008F BODY MASS INDEX DOCD: CPT | Mod: CPTII,S$GLB,, | Performed by: UROLOGY

## 2022-09-09 PROCEDURE — 1159F PR MEDICATION LIST DOCUMENTED IN MEDICAL RECORD: ICD-10-PCS | Mod: CPTII,S$GLB,, | Performed by: UROLOGY

## 2022-09-09 PROCEDURE — 3078F PR MOST RECENT DIASTOLIC BLOOD PRESSURE < 80 MM HG: ICD-10-PCS | Mod: CPTII,S$GLB,, | Performed by: UROLOGY

## 2022-09-09 PROCEDURE — 99214 PR OFFICE/OUTPT VISIT, EST, LEVL IV, 30-39 MIN: ICD-10-PCS | Mod: S$GLB,,, | Performed by: UROLOGY

## 2022-09-09 PROCEDURE — 1160F PR REVIEW ALL MEDS BY PRESCRIBER/CLIN PHARMACIST DOCUMENTED: ICD-10-PCS | Mod: CPTII,S$GLB,, | Performed by: UROLOGY

## 2022-09-09 PROCEDURE — 3008F PR BODY MASS INDEX (BMI) DOCUMENTED: ICD-10-PCS | Mod: CPTII,S$GLB,, | Performed by: UROLOGY

## 2022-09-09 PROCEDURE — 3074F SYST BP LT 130 MM HG: CPT | Mod: CPTII,S$GLB,, | Performed by: UROLOGY

## 2022-09-09 PROCEDURE — 1126F PR PAIN SEVERITY QUANTIFIED, NO PAIN PRESENT: ICD-10-PCS | Mod: CPTII,S$GLB,, | Performed by: UROLOGY

## 2022-09-09 PROCEDURE — 3288F PR FALLS RISK ASSESSMENT DOCUMENTED: ICD-10-PCS | Mod: CPTII,S$GLB,, | Performed by: UROLOGY

## 2022-09-09 PROCEDURE — 99214 OFFICE O/P EST MOD 30 MIN: CPT | Mod: S$GLB,,, | Performed by: UROLOGY

## 2022-09-09 PROCEDURE — 1101F PR PT FALLS ASSESS DOC 0-1 FALLS W/OUT INJ PAST YR: ICD-10-PCS | Mod: CPTII,S$GLB,, | Performed by: UROLOGY

## 2022-09-09 PROCEDURE — 1101F PT FALLS ASSESS-DOCD LE1/YR: CPT | Mod: CPTII,S$GLB,, | Performed by: UROLOGY

## 2022-09-09 PROCEDURE — 3288F FALL RISK ASSESSMENT DOCD: CPT | Mod: CPTII,S$GLB,, | Performed by: UROLOGY

## 2022-09-09 PROCEDURE — 99499 UNLISTED E&M SERVICE: CPT | Mod: S$GLB,,, | Performed by: UROLOGY

## 2022-09-09 PROCEDURE — 1159F MED LIST DOCD IN RCRD: CPT | Mod: CPTII,S$GLB,, | Performed by: UROLOGY

## 2022-09-09 PROCEDURE — 99999 PR PBB SHADOW E&M-EST. PATIENT-LVL III: CPT | Mod: PBBFAC,,, | Performed by: UROLOGY

## 2022-09-09 NOTE — PROGRESS NOTES
Chief Complaint:  Very low risk prostate cancer    HPI:   09/09/2022 - patient returns today after biopsy, path showed less than 5 percent [place] 3 + 3 on right base, MRI negative for concerning lesions, prostate 65 grams, presents today for discussion    06/28/2022 - 65 yo male that presents for elevated PSA.  Patient had routine labs obtained which showed an elevated PSA to 5.8.  This was confirmed a month later at 6.2.  He denies any family history of  cancers.  Denies gross hematuria.  He does have a urinary issues with incomplete emptying, urgency, and weak stream but is not currently on any medications for his prostate.  He notes that he tends to hold his urine for longer than usual due to him being a  and not being able to stop.  Denies any prior urologic procedures.  Also notes ED for the last two years, states that he has less desire than prior.  IPSS - 4/2/4/4/3/1/1 = 19 QOL - 4(mostly dissatisfied)    PMH:  Past Medical History:   Diagnosis Date    Esophageal diverticulum     Helicobacter pylori ab+     Hyperlipidemia     Pityriasis rosea        PSH:  Past Surgical History:   Procedure Laterality Date    ESOPHAGEAL DILATION      ESOPHAGEAL MOTILITY STUDY USING HIGH RESOLUTION MANOMETRY N/A 7/6/2022    Procedure: MOTILITY STUDY, ESOPHAGUS, USING HIGH RESOLUTION MANOMETRY;  Surgeon: Jaqui Shelton MD;  Location: [facility];  Service: Endoscopy;  Laterality: N/A;    ESOPHAGOGASTRODUODENOSCOPY N/A 07/24/2021    Procedure: EGD (ESOPHAGOGASTRODUODENOSCOPY);  Surgeon: Felipe Briscoe III, MD;  Location: [facility];  Service: Endoscopy;  Laterality: N/A;    ESOPHAGOGASTRODUODENOSCOPY N/A 7/6/2022    Procedure: EGD (ESOPHAGOGASTRODUODENOSCOPY);  Surgeon: Jaqui Shelton MD;  Location: [facility];  Service: Endoscopy;  Laterality: N/A;    LUNG SURGERY      age 12, partial pneumonectomy       Family History:  Family History   Problem Relation Age of Onset    Diabetes Mother      Heart disease Mother     Intracerebral hemorrhage Father        Social History:  Social History     Tobacco Use    Smoking status: Never    Smokeless tobacco: Never   Substance Use Topics    Alcohol use: Yes     Comment: rare    Drug use: No        Review of Systems:  General: No fever, chills  Skin: No rashes  Chest:  Denies cough and sputum production  Heart: Denies chest pain  Resp: Denies dyspnea  Abdomen: Denies diarrhea, abdominal pain, hematemesis, or blood in stool.  Musculoskeletal: No joint stiffness or swelling. Denies back pain.  : see HPI  Neuro: no dizziness or weakness    Allergies:  Patient has no known allergies.    Medications:    Current Outpatient Medications:     atorvastatin (LIPITOR) 40 MG tablet, Take 1 tablet (40 mg total) by mouth once daily., Disp: 90 tablet, Rfl: 3    sildenafiL (VIAGRA) 100 MG tablet, Take 1 tablet (100 mg total) by mouth daily as needed for Erectile Dysfunction. (Patient not taking: Reported on 9/9/2022), Disp: 10 tablet, Rfl: 11    Physical Exam:  Vitals:    09/09/22 1046   BP: 113/74   Pulse: 70     Body mass index is 25.69 kg/m².  General: awake, alert, cooperative  Head: NC/AT  Ears: external ears normal  Eyes: sclera normal  Lungs: normal inspiration, NAD  Heart: well-perfused  Abdomen: Soft, NT, ND   6/22: Normal circ'd phallus, meatus normal in size and position, BL testicles palpable, no masses, nontender, no abnormalities of epididymi  BOO 6/22: Normal rectal tone, no hemorrhoids. Prostate smooth and normal, no nodules 50 gm SV not palpable. Perineum and anus normal.  Lymphatic: groin nodes negative  Skin: The skin is warm and dry  Ext: No c/c/e.  Neuro: grossly intact, AOx3    RADIOLOGY:  No recent relevant imaging available for review.    LABS:  I personally reviewed the following lab values:  Lab Results   Component Value Date    WBC 5.67 09/06/2022    HGB 12.0 (L) 09/06/2022    HCT 38.0 (L) 09/06/2022     09/06/2022     05/02/2022    K  3.7 05/02/2022     05/02/2022    CREATININE 0.9 08/11/2022    BUN 10 05/02/2022    CO2 27 05/02/2022    TSH 0.869 10/28/2009    PSA 5.8 (H) 05/02/2022    CHOL 287 (H) 05/02/2022    TRIG 84 05/02/2022    HDL 66 05/02/2022    ALT 14 05/02/2022    AST 18 05/02/2022       Assessment/Plan:   Morris Parada is a 67 y.o. male with:    Very low risk prostate cancer - I reviewed options for low risk prostate cancer including active surveillance, cryoablation, HIFU, radiation, and surgery. I explained that I do not perform cryoablation or HIFU, and thus would focus our decision on active surveillance, radiation, and surgery.  I reviewed active surveillance in detail including the algorithm of the following PSAs, obtaining an MRI, and repeating a biopsy.  I explained that I typically obtain a PSA every 3 months, obtain an MRI in 9-12 months from prior biopsy, and then obtain a repeat biopsy in 18-24 months from prior biopsy, as long as the MRI is negative.  I explained that if his biopsy results are more aggressive, that we would move to treatment at that time.  Patient understands and agrees. F/u 3 months with PSA    BPH - not interested in medications currently    ED - PRN viagra    Total visit time = 32 minutes, of which more than 50% of that time was spent in face to face counseling on prostate cancer options and coordinating care.    Neal Sanchez MD  Urology

## 2022-10-11 ENCOUNTER — LAB VISIT (OUTPATIENT)
Dept: LAB | Facility: HOSPITAL | Age: 67
End: 2022-10-11
Attending: INTERNAL MEDICINE
Payer: MEDICARE

## 2022-10-11 DIAGNOSIS — D64.9 ANEMIA, UNSPECIFIED TYPE: ICD-10-CM

## 2022-10-11 LAB
BASOPHILS # BLD AUTO: 0.04 K/UL (ref 0–0.2)
BASOPHILS NFR BLD: 0.7 % (ref 0–1.9)
DIFFERENTIAL METHOD: ABNORMAL
EOSINOPHIL # BLD AUTO: 0.4 K/UL (ref 0–0.5)
EOSINOPHIL NFR BLD: 6.2 % (ref 0–8)
ERYTHROCYTE [DISTWIDTH] IN BLOOD BY AUTOMATED COUNT: 13.8 % (ref 11.5–14.5)
HCT VFR BLD AUTO: 41.6 % (ref 40–54)
HGB BLD-MCNC: 12.9 G/DL (ref 14–18)
IMM GRANULOCYTES # BLD AUTO: 0.01 K/UL (ref 0–0.04)
IMM GRANULOCYTES NFR BLD AUTO: 0.2 % (ref 0–0.5)
LYMPHOCYTES # BLD AUTO: 1.7 K/UL (ref 1–4.8)
LYMPHOCYTES NFR BLD: 29.6 % (ref 18–48)
MCH RBC QN AUTO: 27.7 PG (ref 27–31)
MCHC RBC AUTO-ENTMCNC: 31 G/DL (ref 32–36)
MCV RBC AUTO: 90 FL (ref 82–98)
MONOCYTES # BLD AUTO: 0.5 K/UL (ref 0.3–1)
MONOCYTES NFR BLD: 8.7 % (ref 4–15)
NEUTROPHILS # BLD AUTO: 3.2 K/UL (ref 1.8–7.7)
NEUTROPHILS NFR BLD: 54.6 % (ref 38–73)
NRBC BLD-RTO: 0 /100 WBC
PLATELET # BLD AUTO: 183 K/UL (ref 150–450)
PMV BLD AUTO: 10.3 FL (ref 9.2–12.9)
RBC # BLD AUTO: 4.65 M/UL (ref 4.6–6.2)
WBC # BLD AUTO: 5.78 K/UL (ref 3.9–12.7)

## 2022-10-11 PROCEDURE — 85025 COMPLETE CBC W/AUTO DIFF WBC: CPT | Performed by: INTERNAL MEDICINE

## 2022-10-11 PROCEDURE — 36415 COLL VENOUS BLD VENIPUNCTURE: CPT | Mod: PO | Performed by: INTERNAL MEDICINE

## 2022-10-12 ENCOUNTER — TELEPHONE (OUTPATIENT)
Dept: PRIMARY CARE CLINIC | Facility: CLINIC | Age: 67
End: 2022-10-12
Payer: MEDICARE

## 2022-10-12 DIAGNOSIS — D64.9 ANEMIA, UNSPECIFIED TYPE: Primary | ICD-10-CM

## 2022-10-12 NOTE — TELEPHONE ENCOUNTER
----- Message from Flavio Miller MD sent at 10/12/2022  6:13 AM CDT -----  Please schedule labs and notify patient.

## 2022-10-26 ENCOUNTER — PATIENT MESSAGE (OUTPATIENT)
Dept: GASTROENTEROLOGY | Facility: CLINIC | Age: 67
End: 2022-10-26
Payer: MEDICARE

## 2022-10-26 RX ORDER — CETIRIZINE HYDROCHLORIDE 10 MG/1
10 TABLET ORAL DAILY
Status: ON HOLD | COMMUNITY
End: 2023-04-18 | Stop reason: HOSPADM

## 2022-10-26 NOTE — PROGRESS NOTES
Confirmed/ pre-charted for  10/31/22 apt  Pt will precheck in either Sunday or Monday am. Will have tech support ph no   Instructed to log in for apt 10 min early

## 2022-10-28 ENCOUNTER — LAB VISIT (OUTPATIENT)
Dept: LAB | Facility: HOSPITAL | Age: 67
End: 2022-10-28
Attending: INTERNAL MEDICINE
Payer: MEDICARE

## 2022-10-28 DIAGNOSIS — E78.2 MIXED HYPERLIPIDEMIA: ICD-10-CM

## 2022-10-28 LAB
CHOLEST SERPL-MCNC: 162 MG/DL (ref 120–199)
CHOLEST/HDLC SERPL: 2.8 {RATIO} (ref 2–5)
HDLC SERPL-MCNC: 58 MG/DL (ref 40–75)
HDLC SERPL: 35.8 % (ref 20–50)
LDLC SERPL CALC-MCNC: 88.8 MG/DL (ref 63–159)
NONHDLC SERPL-MCNC: 104 MG/DL
TRIGL SERPL-MCNC: 76 MG/DL (ref 30–150)

## 2022-10-28 PROCEDURE — 36415 COLL VENOUS BLD VENIPUNCTURE: CPT | Mod: PO | Performed by: INTERNAL MEDICINE

## 2022-10-28 PROCEDURE — 80061 LIPID PANEL: CPT | Performed by: INTERNAL MEDICINE

## 2022-10-31 ENCOUNTER — IMMUNIZATION (OUTPATIENT)
Dept: PRIMARY CARE CLINIC | Facility: CLINIC | Age: 67
End: 2022-10-31
Payer: MEDICARE

## 2022-10-31 ENCOUNTER — TELEPHONE (OUTPATIENT)
Dept: GASTROENTEROLOGY | Facility: CLINIC | Age: 67
End: 2022-10-31
Payer: MEDICARE

## 2022-10-31 ENCOUNTER — OFFICE VISIT (OUTPATIENT)
Dept: GASTROENTEROLOGY | Facility: CLINIC | Age: 67
End: 2022-10-31
Payer: MEDICARE

## 2022-10-31 ENCOUNTER — PATIENT MESSAGE (OUTPATIENT)
Dept: GASTROENTEROLOGY | Facility: CLINIC | Age: 67
End: 2022-10-31

## 2022-10-31 DIAGNOSIS — Z23 NEED FOR VACCINATION: Primary | ICD-10-CM

## 2022-10-31 DIAGNOSIS — K21.9 GASTROESOPHAGEAL REFLUX DISEASE, UNSPECIFIED WHETHER ESOPHAGITIS PRESENT: ICD-10-CM

## 2022-10-31 DIAGNOSIS — R68.81 EARLY SATIETY: ICD-10-CM

## 2022-10-31 DIAGNOSIS — R13.10 DYSPHAGIA, UNSPECIFIED TYPE: Primary | ICD-10-CM

## 2022-10-31 PROCEDURE — 1160F PR REVIEW ALL MEDS BY PRESCRIBER/CLIN PHARMACIST DOCUMENTED: ICD-10-PCS | Mod: CPTII,95,, | Performed by: INTERNAL MEDICINE

## 2022-10-31 PROCEDURE — 1159F MED LIST DOCD IN RCRD: CPT | Mod: CPTII,95,, | Performed by: INTERNAL MEDICINE

## 2022-10-31 PROCEDURE — 91313 COVID-19, MRNA, LNP-S, BIVALENT BOOSTER, PF, 50 MCG/0.5 ML: CPT | Mod: PBBFAC | Performed by: FAMILY MEDICINE

## 2022-10-31 PROCEDURE — 99499 RISK ADDL DX/OHS AUDIT: ICD-10-PCS | Mod: HCNC,95,, | Performed by: INTERNAL MEDICINE

## 2022-10-31 PROCEDURE — 99215 PR OFFICE/OUTPT VISIT, EST, LEVL V, 40-54 MIN: ICD-10-PCS | Mod: 95,,, | Performed by: INTERNAL MEDICINE

## 2022-10-31 PROCEDURE — 1160F RVW MEDS BY RX/DR IN RCRD: CPT | Mod: CPTII,95,, | Performed by: INTERNAL MEDICINE

## 2022-10-31 PROCEDURE — 99499 UNLISTED E&M SERVICE: CPT | Mod: HCNC,95,, | Performed by: INTERNAL MEDICINE

## 2022-10-31 PROCEDURE — 1159F PR MEDICATION LIST DOCUMENTED IN MEDICAL RECORD: ICD-10-PCS | Mod: CPTII,95,, | Performed by: INTERNAL MEDICINE

## 2022-10-31 PROCEDURE — 99215 OFFICE O/P EST HI 40 MIN: CPT | Mod: 95,,, | Performed by: INTERNAL MEDICINE

## 2022-10-31 PROCEDURE — 0134A COVID-19, MRNA, LNP-S, BIVALENT BOOSTER, PF, 50 MCG/0.5 ML: CPT | Mod: CV19,PBBFAC | Performed by: FAMILY MEDICINE

## 2022-10-31 NOTE — PROGRESS NOTES
The patient location is: home  The chief complaint leading to consultation is: dysphagia, regurgitation     Visit type: audiovisual    Face to Face time with patient: 40  44 minutes of total time spent on the encounter, which includes face to face time and non-face to face time preparing to see the patient (eg, review of tests), Obtaining and/or reviewing separately obtained history, Documenting clinical information in the electronic or other health record, Independently interpreting results (not separately reported) and communicating results to the patient/family/caregiver, or Care coordination (not separately reported).         Each patient to whom he or she provides medical services by telemedicine is:  (1) informed of the relationship between the physician and patient and the respective role of any other health care provider with respect to management of the patient; and (2) notified that he or she may decline to receive medical services by telemedicine and may withdraw from such care at any time.    Notes:          [facility] Consultation Note    Reason for Consult:  No chief complaint on file.        PCP:   Flavio Miller   [id number] [address] [facility] / [place] [id number]    Referring [place]  [id number] [facility]  [place] [id number]      HPI:  Morris Parada is a 67 y.o. male referred to motility clinic for second opinion regarding the following problems:      Pt previously seen by GI in [place].  PT is new to me     No pyrosis   Regurgitation: occasional     Dysphagia.    Problems with solids: w every meal   Problems with liquids: no   Choking while eating:  no  Coughing while eating: no   Location: upper esophagus     Had dysphagia in childhood.  Told that food was staying in esophagus, casued lung damage   Self dilations at home   R partial pneumonectomy and possible HM 13yo  Significant improvement w HM, but required liquids to help push food down   Symptoms  started getting worse about 20 years ago, sever in the past few years     History of food impactions requiring ED visit: yes   History of allergies/eczema. No   Esophageal diverticulum [facility] records    DIET: regular diet diet w lots of liquid, avoids meats    EGD w dilation 7/2022 with some improvement     No chest pain    Eckardt Symptom Score  Dysphagia: 3  Regurgitation: 1  Retrosternal pain: 0  Weight Loss: 0  Total: 4    Early satiety. Occasional   Sm frequent meals   EGD w food in the stomach          HP  IGG + 2010  PT not sure if he got tx  No Hp on recent bx    R partial pneumonectomy at 11yo      Sildenafil for ED. Not taking     Denies GERD, nausea, vomiting, early satiety, abdominal pain, bloating, diarrhea, constipation, BRBPR, melena, weight loss.       Total visit time was    minutes, more than 50% of which was spent in face-to-face counseling with patient regarding symptoms, diagnostic results, prognosis, risks and benefits of treatment options, instructions for management, importance of compliance with chosen treatment options and coping strategies.      Previous Studies:   Manometry 07/06/2022: Absent peristalsis.  IRP 4.7.  20% pan esophageal pressurization.  100% failed.  0% normal.  Complete bolus clearance.  Three of 5 upright swallows without obstruction to swallows with we contraction.  Upright IRP 14.5.  Rapid and solids were  EGD 07/06/2022:  Z-line regular at 45.  Dilation the entire esophagus.  In the middle 3rd of the esophagus in the lower 3rd of the esophagus.  Medium amount of food in the stomach.  Normal duodenal bulb and 2nd portion of duodenum.  EGD 07/24/2021: Food in the middle 3rd of the esophagus.  Dilation in middle 3rd of the esophagus.  Large amount of food in the stomach.  Chronic gastritis (acute ischemic gastritis with ulceration and moderate to marked acute and chronic inflammation arising in a background of chemical gastritis.  No HP.  No intestinal metaplasia or  dysplasia.).  Normal duodenum.  Benign appearing esophageal stenosis.    Relevant Surgical History:    R partial pneumonectomy and possible HM 13yo    ROS:  ROS     Complete ROS negative except as above    Medical History:   Past Medical History:   Diagnosis Date    Dysphagia     Esophageal diverticulum     Helicobacter pylori ab+     Hyperlipidemia     Pityriasis rosea         Surgical History:   Past Surgical History:   Procedure Laterality Date    ESOPHAGEAL DILATION      ESOPHAGEAL MOTILITY STUDY USING HIGH RESOLUTION MANOMETRY N/A 7/6/2022    Procedure: MOTILITY STUDY, ESOPHAGUS, USING HIGH RESOLUTION MANOMETRY;  Surgeon: Jaqui Shelton MD;  [facility];  Service: Endoscopy;  Laterality: N/A;    ESOPHAGOGASTRODUODENOSCOPY N/A 07/24/2021    Procedure: EGD (ESOPHAGOGASTRODUODENOSCOPY);  Surgeon: Felipe Briscoe III, MD;  [facility]: [facility];  Service: Endoscopy;  Laterality: N/A;    ESOPHAGOGASTRODUODENOSCOPY N/A 7/6/2022    Procedure: EGD (ESOPHAGOGASTRODUODENOSCOPY);  Surgeon: Jaqui Shelton MD;  Location[facility];  Service: Endoscopy;  Laterality: N/A;    LUNG SURGERY      age 12, partial pneumonectomy        Family History:   Family History   Problem Relation Age of Onset    Diabetes Mother     Heart disease Mother     Intracerebral hemorrhage Father     Hyperlipidemia Sister     Hyperlipidemia Sister     Breast cancer Maternal Grandmother     Esophageal cancer Neg Hx     Stomach cancer Neg Hx     Colon cancer Neg Hx     Colon polyps Neg Hx     Rectal cancer Neg Hx     Liver cancer Neg Hx     Pancreatic cancer Neg Hx     Irritable bowel syndrome Neg Hx     Celiac disease Neg Hx     Crohn's disease Neg Hx         Social History:   Social History     Socioeconomic History    Marital status:     Number of children: 3   Occupational History     Employer: [employer]    Occupation: [employer]   Tobacco Use    Smoking status: Never    Smokeless tobacco: Never   Substance  and Sexual Activity    Alcohol use: Yes     Comment: rare    Drug use: No    Sexual activity: Yes     Partners: Female   Social History Narrative    ** Merged History Encounter **             Review of patient's allergies indicates:  No Known Allergies    Current Outpatient Medications   Medication Sig Dispense Refill    atorvastatin (LIPITOR) 40 MG tablet Take 1 tablet (40 mg total) by mouth once daily. 90 tablet 3    cetirizine (ZYRTEC) 10 MG tablet Take 10 mg by mouth once daily.      sildenafiL (VIAGRA) 100 MG tablet Take 1 tablet (100 mg total) by mouth daily as needed for Erectile Dysfunction. (Patient not taking: Reported on 9/9/2022) 10 tablet 11     No current facility-administered medications for this visit.        Objective Findings:  Vital Signs:  There were no vitals taken for this visit.  There is no height or weight on file to calculate BMI.    Physical Exam:  Physical Exam:limited due to video visit  General appearance: alert, cooperative, no distress  HENT: Normocephalic, atraumatic, neck symmetrical, no nasal discharge  Eyes: conjunctivae/corneas clear,  EOM's intact  Extremities: visible extremities symmetric; no clubbing, cyanosis, or edema  Integument: visible Skin color, texture, turgor normal; no rashes; hair distrubution normal  Neurologic: Alert and oriented X 3,  Psychiatric: no pressured speech; normal affect; no evidence of impaired cognition      Labs:   Reviewed in Epic/record      Assessment and Plan:  Morris Parada is a 67 y.o. male with referred to Esophageal Motility Clinic for 2nd opinion regarding following problems:    Regurgitation: occasional     Dysphagia.    Regurgitation   Eckardt 4/12   Symptoms started in childhood  Partial pneumonectomy and possible HM 11yo in [place]  Significant improvement w HM, but required liquids to help push food down   Symptoms started getting worse about 20 years ago, sever in the past few years   Esophageal diverticulum per chart    Food impaction  7/2021  EGD at OSH w dilated food filled esophagus 7/2021. 7/2022  Manometry w absent contractility (unclear if diaphragm was crossed)  -TBS  -EGD w EOE, r/p TIC, Flip   -Manometry r/o dysphagia, endoscopically placed     Early satiety  EGD w food in the stomach x2         HP  IGG + 2010  PT not sure if he got tx  No Hp on recent bx  -Def to primary GI   R partial pneumonectomy at 11yo        Follow up in about 3 months (around 1/31/2023).    1. Dysphagia, unspecified type    2. Gastroesophageal reflux disease, unspecified whether esophagitis present    3. Early satiety          Order summary:  Orders Placed This Encounter    FL Esophagram Complete    Case Request Endoscopy: ESOPHAGOGASTRODUODENOSCOPY (EGD), MANOMETRY-ESOPHAGEAL-WITH IMPEDANCE       Discussed with PT that I act as a consult service and do not accept patients to be their primary GI provider. Discussed that the goal of our visits is to address relevant motility problems while deferring other GI problems as well as screening and surveillance to his/her primary GI provider.   Discussed that he/she needs to continue to follow with his local primary GI provider.  Discussed that we will complete his/her workup, clarify diagnosis and attempt to optimize his/her symptoms with intention of him/her returning to referring GI provider for long term GI care.   Pt verbalized understanding.        Thank you so much for allowing me to participate in the care of Morris Chauhan MD      This note was created using voice recognition software, and may contain some unrecognized transcriptional errors.

## 2022-10-31 NOTE — PATIENT INSTRUCTIONS
-Follow up with Dr Briscoe to clarify if you need  treatment for H. Pylori (blood test in 2020)    -Complete timed barium esophagogram first   Barium esophagram is an X-ray of the esophagus. The patient drinks a liquid that contains barium (a silver-white metallic compound). The liquid coats the esophagus and X-rays are taken.    -Complete EGD with EndoFlip  EGD is an endoscopic procedure that allows your doctor to examine your esophagus, stomach and duodenum (part of your small intestine). EGD is an outpatient procedure, meaning you can go home that same day. It takes approximately 30 to 60 minutes to perform.  EndoFLIP is a technology that simultaneously measures the area across the inside of a gastrointestinal organ (for example, the esophagus) and the pressure inside that organ. The ratio of the two measurements is called distensibility (stiffness).    -Complete esophageal manometry   During esophageal manometry, a thin, flexible tube (catheter) that contains pressure sensors is passed through your nose, down your esophagus and into your stomach. Esophageal manometry can be helpful in diagnosing certain disorders that can affect your esophagus.  Esophageal manometry provides information about the movement of food through the esophagus into the stomach. The test measures how well the muscles at the top and bottom of your esophagus (sphincter muscles) open and close, as well as the pressure, speed and pattern of the wave of esophageal muscle contractions that moves food along.

## 2022-10-31 NOTE — TELEPHONE ENCOUNTER
MOTILITY CLINIC PROCEDURE ORDERS    CLEARANCE FOR PROCEDURES:  [x] Not needed   [] Cardiology   [] Pulmonary  [] PCP    PROCEDURES  [] EGD   [] Colonoscopy   [x] EGD with EndoFlip   [x] Esophageal manometry with impedance - dysphagia   [] Esophageal manometry with impedance - rumination  [] Esophageal manometry with impedance - belching   [] EGD with BRAVO 48 hours   [] EGD with BRAVO 96 hours   [] pH Impedance 24 hours   [] Anorectal manometry   [] Rectal Ultrasound     Does manometry need to be placed endoscopically:   [x] Yes    FLOOR:  [] 4th Floor   [x] 2nd Floor    Reason for 2nd Floor:   [] Gastroparesis   [x] End stage achalasia  [] Pre lung transplant   [] Pre hear transplant   [] Pulmonary HTN (PAP>50 or on meds)   [] Severe pulmonary Disease   [] Complex medical problems   [] BMI>50  [] History of anesthesia issues  [] Other:    PREP  [] Standard Prep  [] Severe Constipation Prep (Low residue diet 7 days.  1.5 prep the day prior, 0.5 prep the day of procedure)  [] Full liquid diet 5 days   [x] Full liquid diet 3 days   [] Full liquid diet 2 days   [] Full liquid diet 1 day   [] Other:     MEDICATIONS    pH Studies  [] ON PPI/H2 Blocker   [] OFF PPI/H2 Blocker     Metal allergy (stainless steal w chromium, nickel, copper, cobalt and iron).:   []  Yes    Pacemaker/defibrillator:  [] Yes    Motility Studies (esophageal manometry/anorectal manometry)  Hold narcotics x 1 days if able   Hold TCA x 1 days if able  Propofol/lidocaine only during sedation.  Discuss with Dr. Chauhan if additional sedation needed.   Hold baclofen for thee days (at least one day) if able   Hold muscle relaxants x 1 day if able     Anticoagulation/antiplt agents:   []  Yes    ORDER OF TESTING:  Day 1: TBS  Day 2: EGD/FLIP/manometry       [] No special requirements - pt lives locally     SCHEDULING PRIORITY  [] Urgent  (<7 days)  [] Lung Transplant Workup  [] Priority (<30 days)  [x] Routine 1 (schedule ASAP)  [] Routine 2 (next  available, < 3 months)   [] Routine 3 (ok if > 3 months)       PHYSICIAN  []  Ok with Dr. Fernandez   []  Ok with any GI MD

## 2022-11-01 ENCOUNTER — TELEPHONE (OUTPATIENT)
Dept: GASTROENTEROLOGY | Facility: CLINIC | Age: 67
End: 2022-11-01
Payer: MEDICARE

## 2022-11-01 ENCOUNTER — PATIENT MESSAGE (OUTPATIENT)
Dept: GASTROENTEROLOGY | Facility: CLINIC | Age: 67
End: 2022-11-01
Payer: MEDICARE

## 2022-11-01 NOTE — TELEPHONE ENCOUNTER
Called pt , no ans  [facility] re: scheduling procedure for 11/14/22 @ 8am  Asked pt to check his portal for this apt and I will be sending him pre-procedure instructions as well.    Asked pt to call the clinic with any questions/concerns. Clinic ph no provided    Apt letter and pre-procedure instructions will be mailed.

## 2022-11-14 ENCOUNTER — PATIENT MESSAGE (OUTPATIENT)
Dept: ENDOSCOPY | Facility: HOSPITAL | Age: 67
End: 2022-11-14
Payer: MEDICARE

## 2022-11-14 ENCOUNTER — HOSPITAL ENCOUNTER (OUTPATIENT)
Dept: RADIOLOGY | Facility: HOSPITAL | Age: 67
Discharge: HOME | End: 2022-11-14
Attending: INTERNAL MEDICINE
Payer: MEDICARE

## 2022-11-14 ENCOUNTER — TELEPHONE (OUTPATIENT)
Dept: ENDOSCOPY | Facility: HOSPITAL | Age: 67
End: 2022-11-14
Payer: MEDICARE

## 2022-11-14 DIAGNOSIS — R13.10 DYSPHAGIA, UNSPECIFIED TYPE: ICD-10-CM

## 2022-11-14 PROCEDURE — 74220 X-RAY XM ESOPHAGUS 1CNTRST: CPT | Mod: 26,,, | Performed by: RADIOLOGY

## 2022-11-14 PROCEDURE — 74220 FL ESOPHAGRAM COMPLETE: ICD-10-PCS | Mod: 26,,, | Performed by: RADIOLOGY

## 2022-11-14 PROCEDURE — 25500020 PHARM REV CODE 255: Performed by: INTERNAL MEDICINE

## 2022-11-14 PROCEDURE — A9698 NON-RAD CONTRAST MATERIALNOC: HCPCS | Performed by: INTERNAL MEDICINE

## 2022-11-14 PROCEDURE — 74220 X-RAY XM ESOPHAGUS 1CNTRST: CPT | Mod: TC

## 2022-11-14 RX ADMIN — BARIUM SULFATE 200 ML: 0.81 POWDER, FOR SUSPENSION ORAL at 08:11

## 2022-11-14 RX ADMIN — BARIUM SULFATE 50 ML: 0.6 SUSPENSION ORAL at 08:11

## 2022-11-14 NOTE — TELEPHONE ENCOUNTER
Contacted patient to schedule procedure. As per our conversation,  patient is scheduled for EGD/Endoflip and Esophageal Manometry on 12/15/22 at 8:00 am and 10:00 am. Instructions reviewed with patient and informed instructions will be on patient portal for review.

## 2022-12-05 ENCOUNTER — LAB VISIT (OUTPATIENT)
Dept: LAB | Facility: HOSPITAL | Age: 67
End: 2022-12-05
Attending: UROLOGY
Payer: MEDICARE

## 2022-12-05 DIAGNOSIS — C61 PROSTATE CANCER: ICD-10-CM

## 2022-12-05 LAB — COMPLEXED PSA SERPL-MCNC: 5.2 NG/ML (ref 0–4)

## 2022-12-05 PROCEDURE — 36415 COLL VENOUS BLD VENIPUNCTURE: CPT | Performed by: UROLOGY

## 2022-12-05 PROCEDURE — 84153 ASSAY OF PSA TOTAL: CPT | Performed by: UROLOGY

## 2022-12-09 ENCOUNTER — OFFICE VISIT (OUTPATIENT)
Dept: UROLOGY | Facility: CLINIC | Age: 67
End: 2022-12-09
Payer: MEDICARE

## 2022-12-09 VITALS
DIASTOLIC BLOOD PRESSURE: 68 MMHG | HEART RATE: 82 BPM | WEIGHT: 179 LBS | HEIGHT: 70 IN | BODY MASS INDEX: 25.62 KG/M2 | SYSTOLIC BLOOD PRESSURE: 106 MMHG

## 2022-12-09 DIAGNOSIS — R97.20 ELEVATED PSA: ICD-10-CM

## 2022-12-09 DIAGNOSIS — C61 PROSTATE CANCER: Primary | ICD-10-CM

## 2022-12-09 PROCEDURE — 1126F AMNT PAIN NOTED NONE PRSNT: CPT | Mod: HCNC,CPTII,S$GLB, | Performed by: UROLOGY

## 2022-12-09 PROCEDURE — 3078F PR MOST RECENT DIASTOLIC BLOOD PRESSURE < 80 MM HG: ICD-10-PCS | Mod: HCNC,CPTII,S$GLB, | Performed by: UROLOGY

## 2022-12-09 PROCEDURE — 1101F PT FALLS ASSESS-DOCD LE1/YR: CPT | Mod: HCNC,CPTII,S$GLB, | Performed by: UROLOGY

## 2022-12-09 PROCEDURE — 3288F FALL RISK ASSESSMENT DOCD: CPT | Mod: HCNC,CPTII,S$GLB, | Performed by: UROLOGY

## 2022-12-09 PROCEDURE — 3008F PR BODY MASS INDEX (BMI) DOCUMENTED: ICD-10-PCS | Mod: HCNC,CPTII,S$GLB, | Performed by: UROLOGY

## 2022-12-09 PROCEDURE — 1101F PR PT FALLS ASSESS DOC 0-1 FALLS W/OUT INJ PAST YR: ICD-10-PCS | Mod: HCNC,CPTII,S$GLB, | Performed by: UROLOGY

## 2022-12-09 PROCEDURE — 3074F PR MOST RECENT SYSTOLIC BLOOD PRESSURE < 130 MM HG: ICD-10-PCS | Mod: HCNC,CPTII,S$GLB, | Performed by: UROLOGY

## 2022-12-09 PROCEDURE — 1159F PR MEDICATION LIST DOCUMENTED IN MEDICAL RECORD: ICD-10-PCS | Mod: HCNC,CPTII,S$GLB, | Performed by: UROLOGY

## 2022-12-09 PROCEDURE — 1160F PR REVIEW ALL MEDS BY PRESCRIBER/CLIN PHARMACIST DOCUMENTED: ICD-10-PCS | Mod: HCNC,CPTII,S$GLB, | Performed by: UROLOGY

## 2022-12-09 PROCEDURE — 3078F DIAST BP <80 MM HG: CPT | Mod: HCNC,CPTII,S$GLB, | Performed by: UROLOGY

## 2022-12-09 PROCEDURE — 1126F PR PAIN SEVERITY QUANTIFIED, NO PAIN PRESENT: ICD-10-PCS | Mod: HCNC,CPTII,S$GLB, | Performed by: UROLOGY

## 2022-12-09 PROCEDURE — 99214 PR OFFICE/OUTPT VISIT, EST, LEVL IV, 30-39 MIN: ICD-10-PCS | Mod: HCNC,S$GLB,, | Performed by: UROLOGY

## 2022-12-09 PROCEDURE — 99214 OFFICE O/P EST MOD 30 MIN: CPT | Mod: HCNC,S$GLB,, | Performed by: UROLOGY

## 2022-12-09 PROCEDURE — 3288F PR FALLS RISK ASSESSMENT DOCUMENTED: ICD-10-PCS | Mod: HCNC,CPTII,S$GLB, | Performed by: UROLOGY

## 2022-12-09 PROCEDURE — 1159F MED LIST DOCD IN RCRD: CPT | Mod: HCNC,CPTII,S$GLB, | Performed by: UROLOGY

## 2022-12-09 PROCEDURE — 3074F SYST BP LT 130 MM HG: CPT | Mod: HCNC,CPTII,S$GLB, | Performed by: UROLOGY

## 2022-12-09 PROCEDURE — 3008F BODY MASS INDEX DOCD: CPT | Mod: HCNC,CPTII,S$GLB, | Performed by: UROLOGY

## 2022-12-09 PROCEDURE — 99999 PR PBB SHADOW E&M-EST. PATIENT-LVL III: ICD-10-PCS | Mod: PBBFAC,HCNC,, | Performed by: UROLOGY

## 2022-12-09 PROCEDURE — 99999 PR PBB SHADOW E&M-EST. PATIENT-LVL III: CPT | Mod: PBBFAC,HCNC,, | Performed by: UROLOGY

## 2022-12-09 PROCEDURE — 1160F RVW MEDS BY RX/DR IN RCRD: CPT | Mod: HCNC,CPTII,S$GLB, | Performed by: UROLOGY

## 2022-12-09 NOTE — PROGRESS NOTES
Chief Complaint:  Very low risk prostate cancer    HPI:   12/09/2022 - returns today for follow-up, no new issues in the interim, PSA down a little bit to 5.2, nocturia x1 and some mild urgency but overall not bothered enough that he wants to try medication, denies any gross hematuria or UTIs    09/09/2022 - patient returns today after biopsy, path showed less than 5 percent Migel 3 + 3 on right base, MRI negative for concerning lesions, prostate 65 grams, presents today for discussion    06/28/2022 - 67 yo male that presents for elevated PSA.  Patient had routine labs obtained which showed an elevated PSA to 5.8.  This was confirmed a month later at 6.2.  He denies any family history of  cancers.  Denies gross hematuria.  He does have a urinary issues with incomplete emptying, urgency, and weak stream but is not currently on any medications for his prostate.  He notes that he tends to hold his urine for longer than usual due to him being a  and not being able to stop.  Denies any prior urologic procedures.  Also notes ED for the last two years, states that he has less desire than prior.  IPSS - 4/2/4/4/3/1/1 = 19 QOL - 4(mostly dissatisfied)    PMH:  Past Medical History:   Diagnosis Date    Dysphagia     Esophageal diverticulum     Helicobacter pylori ab+     Hyperlipidemia     Pityriasis rosea        PSH:  Past Surgical History:   Procedure Laterality Date    ESOPHAGEAL DILATION      ESOPHAGEAL MOTILITY STUDY USING HIGH RESOLUTION MANOMETRY N/A 7/6/2022    Procedure: MOTILITY STUDY, ESOPHAGUS, USING HIGH RESOLUTION MANOMETRY;  Surgeon: Jaqui Shelton MD;  Location: [facility];  Service: Endoscopy;  Laterality: N/A;    ESOPHAGOGASTRODUODENOSCOPY N/A 07/24/2021    Procedure: EGD (ESOPHAGOGASTRODUODENOSCOPY);  Surgeon: Felipe Briscoe III, MD;  Location: [facility];  Service: Endoscopy;  Laterality: N/A;    ESOPHAGOGASTRODUODENOSCOPY N/A 7/6/2022    Procedure: EGD  (ESOPHAGOGASTRODUODENOSCOPY);  Surgeon: Jaqui Shelton MD;  Location: [facility];  Service: Endoscopy;  Laterality: N/A;    LUNG SURGERY      age 12, partial pneumonectomy       Family History:  Family History   Problem Relation Age of Onset    Diabetes Mother     Heart disease Mother     Intracerebral hemorrhage Father     Hyperlipidemia Sister     Hyperlipidemia Sister     Breast cancer Maternal Grandmother     Esophageal cancer Neg Hx     Stomach cancer Neg Hx     Colon cancer Neg Hx     Colon polyps Neg Hx     Rectal cancer Neg Hx     Liver cancer Neg Hx     Pancreatic cancer Neg Hx     Irritable bowel syndrome Neg Hx     Celiac disease Neg Hx     Crohn's disease Neg Hx        Social History:  Social History     Tobacco Use    Smoking status: Never    Smokeless tobacco: Never   Substance Use Topics    Alcohol use: Yes     Comment: rare    Drug use: No        Review of Systems:  General: No fever, chills  Skin: No rashes  Chest:  Denies cough and sputum production  Heart: Denies chest pain  Resp: Denies dyspnea  Abdomen: Denies diarrhea, abdominal pain, hematemesis, or blood in stool.  Musculoskeletal: No joint stiffness or swelling. Denies back pain.  : see HPI  Neuro: no dizziness or weakness    Allergies:  Patient has no known allergies.    Medications:    Current Outpatient Medications:     atorvastatin (LIPITOR) 40 MG tablet, Take 1 tablet (40 mg total) by mouth once daily., Disp: 90 tablet, Rfl: 3    cetirizine (ZYRTEC) 10 MG tablet, Take 10 mg by mouth once daily., Disp: , Rfl:     sildenafiL (VIAGRA) 100 MG tablet, Take 1 tablet (100 mg total) by mouth daily as needed for Erectile Dysfunction., Disp: 10 tablet, Rfl: 11    Physical Exam:  Vitals:    12/09/22 1350   BP: 106/68   Pulse: 82     Body mass index is 25.68 kg/m².  General: awake, alert, cooperative  Head: NC/AT  Ears: external ears normal  Eyes: sclera normal  Lungs: normal inspiration, NAD  Heart: well-perfused  Abdomen: Soft, NT,  ND   6/22: Normal circ'd phallus, meatus normal in size and position, BL testicles palpable, no masses, nontender, no abnormalities of epididymi  BOO 6/22: Normal rectal tone, no hemorrhoids. Prostate smooth and normal, no nodules 50 gm SV not palpable. Perineum and anus normal.  Lymphatic: groin nodes negative  Skin: The skin is warm and dry  Ext: No c/c/e.  Neuro: grossly intact, AOx3    RADIOLOGY:  No recent relevant imaging available for review.    LABS:  I personally reviewed the following lab values:  Lab Results   Component Value Date    WBC 5.78 10/11/2022    HGB 12.9 (L) 10/11/2022    HCT 41.6 10/11/2022     10/11/2022     05/02/2022    K 3.7 05/02/2022     05/02/2022    CREATININE 0.9 08/11/2022    BUN 10 05/02/2022    CO2 27 05/02/2022    TSH 0.869 10/28/2009    PSA 5.8 (H) 05/02/2022    CHOL 162 10/28/2022    TRIG 76 10/28/2022    HDL 58 10/28/2022    ALT 14 05/02/2022    AST 18 05/02/2022       Assessment/Plan:   Morris Parada is a 67 y.o. male with:    Very low risk prostate cancer - PSA stable, follow-up three months with PSA    BPH - not interested in medications currently    ED - PRN edwar Sanchez MD  Urology

## 2022-12-12 ENCOUNTER — TELEPHONE (OUTPATIENT)
Dept: GASTROENTEROLOGY | Facility: CLINIC | Age: 67
End: 2022-12-12
Payer: MEDICARE

## 2022-12-12 NOTE — TELEPHONE ENCOUNTER
----- Message from Fawn Oswald sent at 12/12/2022  8:21 AM CST -----  Contact: pt  Pt has procedure scheduled Thursday, 12/15. He is trying to reschedule procedure and would like a call back to discuss.     Confirmed contact below:  Contact Name:Morris Parada  Phone Number: [phone]

## 2022-12-13 ENCOUNTER — LAB VISIT (OUTPATIENT)
Dept: LAB | Facility: HOSPITAL | Age: 67
End: 2022-12-13
Attending: INTERNAL MEDICINE
Payer: MEDICARE

## 2022-12-13 DIAGNOSIS — D64.9 ANEMIA, UNSPECIFIED TYPE: ICD-10-CM

## 2022-12-13 LAB
BASOPHILS # BLD AUTO: 0.04 K/UL (ref 0–0.2)
BASOPHILS NFR BLD: 0.7 % (ref 0–1.9)
DIFFERENTIAL METHOD: ABNORMAL
EOSINOPHIL # BLD AUTO: 0.4 K/UL (ref 0–0.5)
EOSINOPHIL NFR BLD: 7.1 % (ref 0–8)
ERYTHROCYTE [DISTWIDTH] IN BLOOD BY AUTOMATED COUNT: 13.7 % (ref 11.5–14.5)
HCT VFR BLD AUTO: 43.8 % (ref 40–54)
HGB BLD-MCNC: 13.5 G/DL (ref 14–18)
IMM GRANULOCYTES # BLD AUTO: 0.01 K/UL (ref 0–0.04)
IMM GRANULOCYTES NFR BLD AUTO: 0.2 % (ref 0–0.5)
LYMPHOCYTES # BLD AUTO: 1.8 K/UL (ref 1–4.8)
LYMPHOCYTES NFR BLD: 32 % (ref 18–48)
MCH RBC QN AUTO: 27.6 PG (ref 27–31)
MCHC RBC AUTO-ENTMCNC: 30.8 G/DL (ref 32–36)
MCV RBC AUTO: 90 FL (ref 82–98)
MONOCYTES # BLD AUTO: 0.4 K/UL (ref 0.3–1)
MONOCYTES NFR BLD: 7.8 % (ref 4–15)
NEUTROPHILS # BLD AUTO: 3 K/UL (ref 1.8–7.7)
NEUTROPHILS NFR BLD: 52.2 % (ref 38–73)
NRBC BLD-RTO: 0 /100 WBC
PLATELET # BLD AUTO: 194 K/UL (ref 150–450)
PMV BLD AUTO: 9.7 FL (ref 9.2–12.9)
RBC # BLD AUTO: 4.89 M/UL (ref 4.6–6.2)
WBC # BLD AUTO: 5.66 K/UL (ref 3.9–12.7)

## 2022-12-13 PROCEDURE — 36415 COLL VENOUS BLD VENIPUNCTURE: CPT | Mod: HCNC,PO | Performed by: INTERNAL MEDICINE

## 2022-12-13 PROCEDURE — 85025 COMPLETE CBC W/AUTO DIFF WBC: CPT | Mod: HCNC | Performed by: INTERNAL MEDICINE

## 2022-12-15 ENCOUNTER — PATIENT MESSAGE (OUTPATIENT)
Dept: ENDOSCOPY | Facility: HOSPITAL | Age: 67
End: 2022-12-15
Payer: MEDICARE

## 2022-12-15 NOTE — TELEPHONE ENCOUNTER
Contacted patient to reschedule procedure. As per our conversation,  patient is rescheduled for EGD/Endoflip and Esophageal Manometry on 1/10/23 at 1:15 pm and 2:30 pm. Instructions reviewed with patient and informed instructions will be on patient portal for review.

## 2022-12-28 ENCOUNTER — TELEPHONE (OUTPATIENT)
Dept: GASTROENTEROLOGY | Facility: CLINIC | Age: 67
End: 2022-12-28
Payer: MEDICARE

## 2022-12-28 NOTE — TELEPHONE ENCOUNTER
Called pt, no ans  Lmvm re: scheduling fu visit at end of Jan or first couple of wks in feb.  Asked pt to call the clinic, ph no provided

## 2022-12-30 ENCOUNTER — PATIENT MESSAGE (OUTPATIENT)
Dept: GASTROENTEROLOGY | Facility: CLINIC | Age: 67
End: 2022-12-30
Payer: MEDICARE

## 2022-12-30 ENCOUNTER — TELEPHONE (OUTPATIENT)
Dept: GASTROENTEROLOGY | Facility: CLINIC | Age: 67
End: 2022-12-30
Payer: MEDICARE

## 2022-12-30 NOTE — TELEPHONE ENCOUNTER
Called pt, no ans  [facility] re: asking if interested in scheduling a fu visit.  Asked pt to call the clinic, ph no provided.

## 2023-01-10 ENCOUNTER — ANESTHESIA EVENT (OUTPATIENT)
Dept: ENDOSCOPY | Facility: HOSPITAL | Age: 68
End: 2023-01-10
Payer: MEDICARE

## 2023-01-10 ENCOUNTER — HOSPITAL ENCOUNTER (OUTPATIENT)
Facility: HOSPITAL | Age: 68
Discharge: HOME | End: 2023-01-10
Attending: INTERNAL MEDICINE | Admitting: INTERNAL MEDICINE
Payer: MEDICARE

## 2023-01-10 ENCOUNTER — ANESTHESIA (OUTPATIENT)
Dept: ENDOSCOPY | Facility: HOSPITAL | Age: 68
End: 2023-01-10
Payer: MEDICARE

## 2023-01-10 VITALS
HEART RATE: 73 BPM | BODY MASS INDEX: 26.05 KG/M2 | RESPIRATION RATE: 18 BRPM | OXYGEN SATURATION: 100 % | SYSTOLIC BLOOD PRESSURE: 126 MMHG | HEIGHT: 70 IN | WEIGHT: 182 LBS | DIASTOLIC BLOOD PRESSURE: 80 MMHG | TEMPERATURE: 98 F

## 2023-01-10 DIAGNOSIS — R13.10 DYSPHAGIA: ICD-10-CM

## 2023-01-10 DIAGNOSIS — R13.10 DYSPHAGIA, UNSPECIFIED TYPE: Primary | ICD-10-CM

## 2023-01-10 PROCEDURE — 63600175 PHARM REV CODE 636 W HCPCS: Mod: HCNC | Performed by: NURSE ANESTHETIST, CERTIFIED REGISTERED

## 2023-01-10 PROCEDURE — 43239 EGD BIOPSY SINGLE/MULTIPLE: CPT | Mod: HCNC | Performed by: INTERNAL MEDICINE

## 2023-01-10 PROCEDURE — 88305 TISSUE EXAM BY PATHOLOGIST: ICD-10-PCS | Mod: 26,HCNC,, | Performed by: STUDENT IN AN ORGANIZED HEALTH CARE EDUCATION/TRAINING PROGRAM

## 2023-01-10 PROCEDURE — D9220A PRA ANESTHESIA: ICD-10-PCS | Mod: HCNC,ANES,, | Performed by: ANESTHESIOLOGY

## 2023-01-10 PROCEDURE — 88305 TISSUE EXAM BY PATHOLOGIST: CPT | Mod: HCNC | Performed by: STUDENT IN AN ORGANIZED HEALTH CARE EDUCATION/TRAINING PROGRAM

## 2023-01-10 PROCEDURE — 25000003 PHARM REV CODE 250: Mod: HCNC | Performed by: NURSE ANESTHETIST, CERTIFIED REGISTERED

## 2023-01-10 PROCEDURE — D9220A PRA ANESTHESIA: Mod: HCNC,CRNA,, | Performed by: NURSE ANESTHETIST, CERTIFIED REGISTERED

## 2023-01-10 PROCEDURE — 27201012 HC FORCEPS, HOT/COLD, DISP: Mod: HCNC | Performed by: INTERNAL MEDICINE

## 2023-01-10 PROCEDURE — 37000009 HC ANESTHESIA EA ADD 15 MINS: Mod: HCNC | Performed by: INTERNAL MEDICINE

## 2023-01-10 PROCEDURE — 91040 ESOPH BALLOON DISTENSION TST: CPT | Mod: TC,HCNC | Performed by: INTERNAL MEDICINE

## 2023-01-10 PROCEDURE — D9220A PRA ANESTHESIA: Mod: HCNC,ANES,, | Performed by: ANESTHESIOLOGY

## 2023-01-10 PROCEDURE — 91040 PR ESOPH BALLOON DISTENSION TST: ICD-10-PCS | Mod: 26,HCNC,, | Performed by: INTERNAL MEDICINE

## 2023-01-10 PROCEDURE — 91010 ESOPHAGUS MOTILITY STUDY: CPT | Mod: TC,HCNC | Performed by: INTERNAL MEDICINE

## 2023-01-10 PROCEDURE — 25000003 PHARM REV CODE 250: Mod: HCNC | Performed by: INTERNAL MEDICINE

## 2023-01-10 PROCEDURE — 43239 EGD BIOPSY SINGLE/MULTIPLE: CPT | Mod: HCNC,,, | Performed by: INTERNAL MEDICINE

## 2023-01-10 PROCEDURE — 37000008 HC ANESTHESIA 1ST 15 MINUTES: Mod: HCNC | Performed by: INTERNAL MEDICINE

## 2023-01-10 PROCEDURE — C1726 CATH, BAL DIL, NON-VASCULAR: HCPCS | Mod: HCNC | Performed by: INTERNAL MEDICINE

## 2023-01-10 PROCEDURE — 88305 TISSUE EXAM BY PATHOLOGIST: CPT | Mod: 26,HCNC,, | Performed by: STUDENT IN AN ORGANIZED HEALTH CARE EDUCATION/TRAINING PROGRAM

## 2023-01-10 PROCEDURE — D9220A PRA ANESTHESIA: ICD-10-PCS | Mod: HCNC,CRNA,, | Performed by: NURSE ANESTHETIST, CERTIFIED REGISTERED

## 2023-01-10 PROCEDURE — 91040 ESOPH BALLOON DISTENSION TST: CPT | Mod: 26,HCNC,, | Performed by: INTERNAL MEDICINE

## 2023-01-10 PROCEDURE — 43239 PR EGD, FLEX, W/BIOPSY, SGL/MULTI: ICD-10-PCS | Mod: HCNC,,, | Performed by: INTERNAL MEDICINE

## 2023-01-10 RX ORDER — PROCHLORPERAZINE EDISYLATE 5 MG/ML
5 INJECTION INTRAMUSCULAR; INTRAVENOUS EVERY 30 MIN PRN
Status: DISCONTINUED | OUTPATIENT
Start: 2023-01-10 | End: 2023-01-10 | Stop reason: HOSPADM

## 2023-01-10 RX ORDER — LIDOCAINE HYDROCHLORIDE 20 MG/ML
INJECTION INTRAVENOUS
Status: DISCONTINUED | OUTPATIENT
Start: 2023-01-10 | End: 2023-01-10

## 2023-01-10 RX ORDER — ONDANSETRON 2 MG/ML
4 INJECTION INTRAMUSCULAR; INTRAVENOUS DAILY PRN
Status: DISCONTINUED | OUTPATIENT
Start: 2023-01-10 | End: 2023-01-10 | Stop reason: HOSPADM

## 2023-01-10 RX ORDER — LIDOCAINE HYDROCHLORIDE 20 MG/ML
JELLY TOPICAL ONCE
Status: COMPLETED | OUTPATIENT
Start: 2023-01-10 | End: 2023-01-10

## 2023-01-10 RX ORDER — SODIUM CHLORIDE 9 MG/ML
INJECTION, SOLUTION INTRAVENOUS CONTINUOUS
Status: DISCONTINUED | OUTPATIENT
Start: 2023-01-10 | End: 2023-01-10 | Stop reason: HOSPADM

## 2023-01-10 RX ORDER — PROPOFOL 10 MG/ML
VIAL (ML) INTRAVENOUS
Status: DISCONTINUED | OUTPATIENT
Start: 2023-01-10 | End: 2023-01-10

## 2023-01-10 RX ORDER — PROPOFOL 10 MG/ML
VIAL (ML) INTRAVENOUS CONTINUOUS PRN
Status: DISCONTINUED | OUTPATIENT
Start: 2023-01-10 | End: 2023-01-10

## 2023-01-10 RX ORDER — SODIUM CHLORIDE 0.9 % (FLUSH) 0.9 %
10 SYRINGE (ML) INJECTION
Status: DISCONTINUED | OUTPATIENT
Start: 2023-01-10 | End: 2023-01-10 | Stop reason: HOSPADM

## 2023-01-10 RX ADMIN — Medication 150 MCG/KG/MIN: at 01:01

## 2023-01-10 RX ADMIN — LIDOCAINE HYDROCHLORIDE 10 ML: 20 JELLY TOPICAL at 01:01

## 2023-01-10 RX ADMIN — PROPOFOL 150 MG: 10 INJECTION, EMULSION INTRAVENOUS at 01:01

## 2023-01-10 RX ADMIN — SODIUM CHLORIDE, SODIUM GLUCONATE, SODIUM ACETATE, POTASSIUM CHLORIDE, MAGNESIUM CHLORIDE, SODIUM PHOSPHATE, DIBASIC, AND POTASSIUM PHOSPHATE: .53; .5; .37; .037; .03; .012; .00082 INJECTION, SOLUTION INTRAVENOUS at 01:01

## 2023-01-10 RX ADMIN — LIDOCAINE HYDROCHLORIDE 100 MG: 20 INJECTION INTRAVENOUS at 01:01

## 2023-01-10 NOTE — ANESTHESIA PREPROCEDURE EVALUATION
01/10/2023  Morris Parada is a 67 y.o., male.    Pre-operative evaluation for Procedure(s) (LRB):  ESOPHAGOGASTRODUODENOSCOPY (EGD) (N/A)  MANOMETRY-ESOPHAGEAL-WITH IMPEDANCE (N/A)    Morris Parada is a 67 y.o. male     Patient Active Problem List   Diagnosis    Mixed hyperlipidemia       Review of patient's allergies indicates:  No Known Allergies    No current facility-administered medications on file prior to encounter.     Current Outpatient Medications on File Prior to Encounter   Medication Sig Dispense Refill    atorvastatin (LIPITOR) 40 MG tablet Take 1 tablet (40 mg total) by mouth once daily. 90 tablet 3    cetirizine (ZYRTEC) 10 MG tablet Take 10 mg by mouth once daily.      sildenafiL (VIAGRA) 100 MG tablet Take 1 tablet (100 mg total) by mouth daily as needed for Erectile Dysfunction. 10 tablet 11       Past Surgical History:   Procedure Laterality Date    ESOPHAGEAL DILATION      ESOPHAGEAL MOTILITY STUDY USING HIGH RESOLUTION MANOMETRY N/A 7/6/2022    Procedure: MOTILITY STUDY, ESOPHAGUS, USING HIGH RESOLUTION MANOMETRY;  Surgeon: Jaqui Shelton MD;  Location: [facility];  Service: Endoscopy;  Laterality: N/A;    ESOPHAGOGASTRODUODENOSCOPY N/A 07/24/2021    Procedure: EGD (ESOPHAGOGASTRODUODENOSCOPY);  Surgeon: Felipe Briscoe III, MD;  Location: [facility];  Service: Endoscopy;  Laterality: N/A;    ESOPHAGOGASTRODUODENOSCOPY N/A 7/6/2022    Procedure: EGD (ESOPHAGOGASTRODUODENOSCOPY);  Surgeon: Jaqui Shelton MD;  Location: [facility];  Service: Endoscopy;  Laterality: N/A;    LUNG SURGERY      age 12, partial pneumonectomy       Social History     Socioeconomic History    Marital status:     Number of children: 3   Occupational History     Employer: [employer]    Occupation:  [employer]   Tobacco Use    Smoking status: Never    Smokeless tobacco:  Never   Substance and Sexual Activity    Alcohol use: Yes     Comment: rare    Drug use: No    Sexual activity: Yes     Partners: Female   Social History Narrative    ** Merged History Encounter **              CBC: No results for input(s): WBC, RBC, HGB, HCT, PLT, MCV, MCH, MCHC in the last 72 hours.    CMP: No results for input(s): NA, K, CL, CO2, BUN, CREATININE, GLU, MG, PHOS, CALCIUM, ALBUMIN, PROT, ALKPHOS, ALT, AST, BILITOT in the last 72 hours.    INR  No results for input(s): PT, INR, PROTIME, APTT in the last 72 hours.        Diagnostic Studies:      EKD Echo:  No results found for this or any previous visit.      Pre-op Assessment    I have reviewed the Patient Summary Reports.     I have reviewed the Nursing Notes. I have reviewed the NPO Status.   I have reviewed the Medications.     Review of Systems  Anesthesia Hx:  No problems with previous Anesthesia  Denies Family Hx of Anesthesia complications.   Denies Personal Hx of Anesthesia complications.   Social:  Alcohol Use, Non-Smoker    Hematology/Oncology:  Hematology Normal   Oncology Normal     Cardiovascular:   Denies MI.  Denies CAD.     Denies Angina. hyperlipidemia  EKG NSR, LAE    Pulmonary:  Pulmonary Normal    Renal/:  Renal/ Normal     Hepatic/GI:   Dysphagia, achalasia   Neurological:  Neurology Normal    Endocrine:  Endocrine Normal    Psych:  Psychiatric Normal           Physical Exam  General: Well nourished, Cooperative, Alert and Oriented    Airway:  Mallampati: II   Mouth Opening: Normal  TM Distance: Normal  Tongue: Normal  Neck ROM: Normal ROM    Dental:  Intact        Anesthesia Plan  Type of Anesthesia, risks & benefits discussed:    Anesthesia Type: Gen Natural Airway, Gen ETT  Intra-op Monitoring Plan: Standard ASA Monitors  Post Op Pain Control Plan: multimodal analgesia  Induction:  IV  Informed Consent: Informed consent signed with the Patient and all parties understand the risks and agree with anesthesia  plan.  All questions answered. Patient consented to blood products? No  ASA Score: 2  Day of Surgery Review of History & Physical: H&P Update referred to the surgeon/provider.    Ready For Surgery From Anesthesia Perspective.     .

## 2023-01-10 NOTE — ANESTHESIA PROCEDURE NOTES
Intubation    Date/Time: 1/10/2023 1:50 PM  Performed by: Jose Warren CRNA  Authorized by: Jad Gaines MD     Intubation:     Induction:  Intravenous    Mask Ventilation:  Not attempted    Attempts:  1    Attempted By:  CRNA    Method of Intubation:  Direct    Blade:  Dill 2    Laryngeal View Grade: Grade I - full view of cords      Difficult Airway Encountered?: No      Complications:  None    Airway Device:  Oral endotracheal tube    Airway Device Size:  7.5    Style/Cuff Inflation:  Cuffed    Inflation Amount (mL):  8    Tube secured:  22    Secured at:  The lips    Placement Verified By:  Capnometry and Colorimetric ETCO2 device    Complicating Factors:  None    Findings Post-Intubation:  BS equal bilateral and atraumatic/condition of teeth unchanged

## 2023-01-10 NOTE — PROVATION PATIENT INSTRUCTIONS
Discharge Summary/Instructions after an Endoscopic Procedure  Patient Name: Morris Parada  Patient MRN: [id number]  Patient YOB: 1955  Tuesday, January 10, 2023  Kiersten Chauhan MD  Dear patient,  As a result of recent federal legislation (The Federal Cures Act), you may   receive lab or pathology results from your procedure in your [facility]   account before your physician is able to contact you. Your physician or   their representative will relay the results to you with their   recommendations at their soonest availability.  Thank you,  RESTRICTIONS:  During your procedure today, you received medications for sedation.  These   medications may affect your judgment, balance and coordination.  Therefore,   for 24 hours, you have the following restrictions:   - DO NOT drive a car, operate machinery, make legal/financial decisions,   sign important papers or drink alcohol.    ACTIVITY:  Today: no heavy lifting, straining or running due to procedural   sedation/anesthesia.  The following day: return to full activity including work.  DIET:  Eat and drink normally unless instructed otherwise.     TREATMENT FOR COMMON SIDE EFFECTS:  - Mild abdominal pain, nausea, belching, bloating or excessive gas:  rest,   eat lightly and use a heating pad.  - Sore Throat: treat with throat lozenges and/or gargle with warm salt   water.  - Because air was used during the procedure, expelling large amounts of air   from your rectum or belching is normal.  - If a bowel prep was taken, you may not have a bowel movement for 1-3 days.    This is normal.  SYMPTOMS TO WATCH FOR AND REPORT TO YOUR PHYSICIAN:  1. Abdominal pain or bloating, other than gas cramps.  2. Chest pain.  3. Back pain.  4. Signs of infection such as: chills or fever occurring within 24 hours   after the procedure.  5. Rectal bleeding, which would show as bright red, maroon, or black stools.   (A tablespoon of blood from the rectum is not serious, especially if    hemorrhoids are present.)  6. Vomiting.  7. Weakness or dizziness.  GO DIRECTLY TO THE NEAREST EMERGENCY ROOM IF YOU HAVE ANY OF THE FOLLOWING:      Difficulty breathing              Chills and/or fever over 101 F   Persistent vomiting and/or vomiting blood   Severe abdominal pain   Severe chest pain   Black, tarry stools   Bleeding- more than one tablespoon   Any other symptom or condition that you feel may need urgent attention  Your doctor recommends these additional instructions:  If any biopsies were taken, your doctors clinic will contact you in 1 to 2   weeks with any results.  - Discharge patient to home (with escort).   - Resume previous diet.   - Continue present medications.   - Await pathology results.   - The findings and recommendations were discussed with the patient.   - Patient has a contact number available for emergencies.  The signs and   symptoms of potential delayed complications were discussed with the   patient.  Return to normal activities tomorrow.  Written discharge   instructions were provided to the patient.  For questions, problems or results please call your physician - Kiersten Chauhan MD at Work:  [phone].  [facility], EMERGENCY ROOM PHONE NUMBER: [phone]  IF A COMPLICATION OR EMERGENCY SITUATION ARISES AND YOU ARE UNABLE TO REACH   YOUR PHYSICIAN - GO DIRECTLY TO THE EMERGENCY ROOM.  Kiersten Chauhan MD  1/10/2023 2:16:57 PM  This report has been verified and signed electronically.  Dear patient,  As a result of recent federal legislation (The Federal Cures Act), you may   receive lab or pathology results from your procedure in your [facility]   account before your physician is able to contact you. Your physician or   their representative will relay the results to you with their   recommendations at their soonest availability.  Thank you,  [employer]OVATION

## 2023-01-10 NOTE — H&P
Short Stay Endoscopy History and Physical    PCP - Flavio Miller MD     Procedure - EGD/FLip/Manometry  ASA - per anesthesia  Mallampati - per anesthesia  History of Anesthesia problems - no  Family history Anesthesia problems -  no   Plan of anesthesia - General    HPI:  This is a 67 y.o. male here for evaluation of dysphagia        Medical History:  has a past medical history of Dysphagia, Esophageal diverticulum, Helicobacter pylori ab+, Hyperlipidemia, and Pityriasis rosea.    Surgical History:  has a past surgical history that includes Lung surgery; Esophagogastroduodenoscopy (N/A, 07/24/2021); Esophageal dilation; Esophagogastroduodenoscopy (N/A, 7/6/2022); and Esophageal motility study using high resolution manometry (N/A, 7/6/2022).    Family History: family history includes Breast cancer in his maternal grandmother; Diabetes in his mother; Heart disease in his mother; Hyperlipidemia in his sister and sister; Intracerebral hemorrhage in his father.. Otherwise no colon cancer, inflammatory bowel disease, or GI malignancies.    Social History:  reports that he has never smoked. He has never used smokeless tobacco. He reports current alcohol use. He reports that he does not use drugs.    Review of patient's allergies indicates:  No Known Allergies    Medications:   Medications Prior to Admission   Medication Sig Dispense Refill Last Dose    atorvastatin (LIPITOR) 40 MG tablet Take 1 tablet (40 mg total) by mouth once daily. 90 tablet 3 1/9/2023    cetirizine (ZYRTEC) 10 MG tablet Take 10 mg by mouth once daily.   1/9/2023    sildenafiL (VIAGRA) 100 MG tablet Take 1 tablet (100 mg total) by mouth daily as needed for Erectile Dysfunction. 10 tablet 11        Physical Exam:    Vital Signs:   Vitals:    01/10/23 1244   BP: 126/78   Pulse: 83   Resp: 16   Temp: 98.2 °F (36.8 °C)       I have explained the risks and benefits of endoscopy procedures to the patient including but not limited to bleeding, perforation,  infection, and death.      Kiersten Chauhan MD

## 2023-01-10 NOTE — TRANSFER OF CARE
"Anesthesia Transfer of Care Note    Patient: Morris Parada    Procedure(s) Performed: Procedure(s) (LRB):  ESOPHAGOGASTRODUODENOSCOPY (EGD) (N/A)  MANOMETRY-ESOPHAGEAL-WITH IMPEDANCE (N/A)    Patient location: [facility]    Anesthesia Type: general    Transport from OR: Transported from OR on 6-10 L/min O2 by face mask with adequate spontaneous ventilation    Post pain: adequate analgesia    Post assessment: no apparent anesthetic complications    Post vital signs: stable    Level of consciousness: awake, alert and oriented    Nausea/Vomiting: no nausea/vomiting    Complications: none    Transfer of care protocol was followed      Last vitals:   Visit Vitals  BP 98/68 (BP Location: Left arm, Patient Position: Lying)   Pulse 90   Temp 36.7 °C (98 °F) (Temporal)   Resp 20   Ht 5' 10" (1.778 m)   Wt 82.6 kg (182 lb)   SpO2 97%   BMI 26.11 kg/m²     "

## 2023-01-10 NOTE — PLAN OF CARE
Discharge instructions given and explained to patient and family with verbalization of understanding all instructions. Patients v/s stable, denies n/v and tolerating po, rates pain level tolerable, IV removed, and family at bedside for patient transport to [facility] for manometry and discharge home.

## 2023-01-10 NOTE — ANESTHESIA POSTPROCEDURE EVALUATION
Anesthesia Post Evaluation    Patient: Morris Parada    Procedure(s) Performed: Procedure(s) (LRB):  ESOPHAGOGASTRODUODENOSCOPY (EGD) (N/A)  MANOMETRY-ESOPHAGEAL-WITH IMPEDANCE (N/A)    Final Anesthesia Type: general      Patient location during evaluation: PACU  Patient participation: Yes- Able to Participate  Level of consciousness: awake and alert  Post-procedure vital signs: reviewed and stable  Pain management: adequate  Airway patency: patent  BÁRBARA mitigation strategies: Extubation while patient is awake and Multimodal analgesia  PONV status at discharge: nausea (controlled)  Anesthetic complications: no      Cardiovascular status: stable  Respiratory status: unassisted and spontaneous ventilation  Hydration status: euvolemic  Follow-up not needed.          Vitals Value Taken Time   /80 01/10/23 1446   Temp 36.7 °C (98 °F) 01/10/23 1425   Pulse 73 01/10/23 1454   Resp 16 01/10/23 1445   SpO2 99 % 01/10/23 1454   Vitals shown include unvalidated device data.      No case tracking events are documented in the log.      Pain/Re Score: Er Score: 10 (1/10/2023  2:45 PM)

## 2023-01-12 ENCOUNTER — TELEPHONE (OUTPATIENT)
Dept: GASTROENTEROLOGY | Facility: CLINIC | Age: 68
End: 2023-01-12
Payer: MEDICARE

## 2023-01-12 PROCEDURE — 91010 ESOPHAGUS MOTILITY STUDY: CPT | Mod: 26,HCNC,, | Performed by: INTERNAL MEDICINE

## 2023-01-12 PROCEDURE — 91037 PR GERD TST W/ NASAL IMPEDENCE ELECTROD: ICD-10-PCS | Mod: 26,HCNC,, | Performed by: INTERNAL MEDICINE

## 2023-01-12 PROCEDURE — 91037 ESOPH IMPED FUNCTION TEST: CPT | Mod: 26,HCNC,, | Performed by: INTERNAL MEDICINE

## 2023-01-12 PROCEDURE — 91010 PR ESOPHAGEAL MOTILITY STUDY, MA2METRY: ICD-10-PCS | Mod: 26,HCNC,, | Performed by: INTERNAL MEDICINE

## 2023-01-12 NOTE — TELEPHONE ENCOUNTER
Called pt and explained per dr Chauhan:    Please let patient know that the manometry shows     Achalasia - Type II          Recommendation:  Follow up with Dr. Chauhan after all studies completed     Fu scheduled for 1/31/23

## 2023-01-12 NOTE — PROVATION PATIENT INSTRUCTIONS
Discharge Summary/Instructions after an Endoscopic Procedure  Patient Name: Morrsi Parada  Patient MRN: [id number]  Patient YOB: 1955 Thursday, January 12, 2023  Kiersten Chauhan MD  Dear patient,  As a result of recent federal legislation (The Federal Cures Act), you may   receive lab or pathology results from your procedure in your [facility]   account before your physician is able to contact you. Your physician or   their representative will relay the results to you with their   recommendations at their soonest availability.  Thank you,  RESTRICTIONS:  During your procedure today, you received medications for sedation.  These   medications may affect your judgment, balance and coordination.  Therefore,   for 24 hours, you have the following restrictions:   - DO NOT drive a car, operate machinery, make legal/financial decisions,   sign important papers or drink alcohol.    ACTIVITY:  Today: no heavy lifting, straining or running due to procedural   sedation/anesthesia.  The following day: return to full activity including work.  DIET:  Eat and drink normally unless instructed otherwise.     TREATMENT FOR COMMON SIDE EFFECTS:  - Mild abdominal pain, nausea, belching, bloating or excessive gas:  rest,   eat lightly and use a heating pad.  - Sore Throat: treat with throat lozenges and/or gargle with warm salt   water.  - Because air was used during the procedure, expelling large amounts of air   from your rectum or belching is normal.  - If a bowel prep was taken, you may not have a bowel movement for 1-3 days.    This is normal.  SYMPTOMS TO WATCH FOR AND REPORT TO YOUR PHYSICIAN:  1. Abdominal pain or bloating, other than gas cramps.  2. Chest pain.  3. Back pain.  4. Signs of infection such as: chills or fever occurring within 24 hours   after the procedure.  5. Rectal bleeding, which would show as bright red, maroon, or black stools.   (A tablespoon of blood from the rectum is not serious, especially if    hemorrhoids are present.)  6. Vomiting.  7. Weakness or dizziness.  GO DIRECTLY TO THE NEAREST EMERGENCY ROOM IF YOU HAVE ANY OF THE FOLLOWING:      Difficulty breathing              Chills and/or fever over 101 F   Persistent vomiting and/or vomiting blood   Severe abdominal pain   Severe chest pain   Black, tarry stools   Bleeding- more than one tablespoon   Any other symptom or condition that you feel may need urgent attention  Your doctor recommends these additional instructions:  If any biopsies were taken, your doctors clinic will contact you in 1 to 2   weeks with any results.  - Return to my office as previously scheduled.  For questions, problems or results please call your physician - Kiersten Chauhan MD at Work:  [phone].  [facility], EMERGENCY ROOM PHONE NUMBER: [phone]  IF A COMPLICATION OR EMERGENCY SITUATION ARISES AND YOU ARE UNABLE TO REACH   YOUR PHYSICIAN - GO DIRECTLY TO THE EMERGENCY ROOM.  Kiersten Chauhan MD  1/12/2023 2:27:21 PM  This report has been verified and signed electronically.  Dear patient,  As a result of recent federal legislation (The Federal Cures Act), you may   receive lab or pathology results from your procedure in your [facility]   account before your physician is able to contact you. Your physician or   their representative will relay the results to you with their   recommendations at their soonest availability.  Thank you,  PROVATION

## 2023-01-12 NOTE — TELEPHONE ENCOUNTER
Manometry Results:    Normal LES pressure with incomplete relaxation with 20% panesophageal pressurization   Achalasia -Type II  Artifact in distal esophagus possibly related to diverticulum  Incomplete bolus clearance  Abnormal multiple rapid swallows test  No significant difference with provocative maneuvers   Evidence of residual liquid at the end of 200cc bolus     Please let patient know that the manometry shows    Achalasia - Type II        Recommendation:  Follow up with Dr. Chauhan after all studies completed

## 2023-01-17 LAB
FINAL PATHOLOGIC DIAGNOSIS: NORMAL
GROSS: NORMAL
Lab: NORMAL
MICROSCOPIC EXAM: NORMAL

## 2023-01-19 NOTE — PLAN OF CARE
DEWAYNE Goel at bedside discussing manometry procedure.    Opzelura Pregnancy And Lactation Text: There is insufficient data to evaluate drug-associated risk for major birth defects, miscarriage, or other adverse maternal or fetal outcomes.  There is a pregnancy registry that monitors pregnancy outcomes in pregnant persons exposed to the medication during pregnancy.  It is unknown if this medication is excreted in breast milk.  Do not breastfeed during treatment and for about 4 weeks after the last dose.

## 2023-01-31 ENCOUNTER — OFFICE VISIT (OUTPATIENT)
Dept: GASTROENTEROLOGY | Facility: CLINIC | Age: 68
End: 2023-01-31
Payer: MEDICARE

## 2023-01-31 ENCOUNTER — TELEPHONE (OUTPATIENT)
Dept: GASTROENTEROLOGY | Facility: CLINIC | Age: 68
End: 2023-01-31

## 2023-01-31 DIAGNOSIS — R13.10 DYSPHAGIA, UNSPECIFIED TYPE: Primary | ICD-10-CM

## 2023-01-31 DIAGNOSIS — K22.0 ACHALASIA: ICD-10-CM

## 2023-01-31 DIAGNOSIS — K21.9 GASTROESOPHAGEAL REFLUX DISEASE, UNSPECIFIED WHETHER ESOPHAGITIS PRESENT: ICD-10-CM

## 2023-01-31 DIAGNOSIS — K22.0 ACHALASIA: Primary | ICD-10-CM

## 2023-01-31 DIAGNOSIS — Q39.6 ESOPHAGEAL DIVERTICULUM: ICD-10-CM

## 2023-01-31 PROCEDURE — 99215 PR OFFICE/OUTPT VISIT, EST, LEVL V, 40-54 MIN: ICD-10-PCS | Mod: HCNC,95,, | Performed by: INTERNAL MEDICINE

## 2023-01-31 PROCEDURE — 99499 RISK ADDL DX/OHS AUDIT: ICD-10-PCS | Mod: HCNC,,, | Performed by: INTERNAL MEDICINE

## 2023-01-31 PROCEDURE — 99499 UNLISTED E&M SERVICE: CPT | Mod: HCNC,,, | Performed by: INTERNAL MEDICINE

## 2023-01-31 PROCEDURE — 99215 OFFICE O/P EST HI 40 MIN: CPT | Mod: HCNC,95,, | Performed by: INTERNAL MEDICINE

## 2023-01-31 NOTE — PATIENT INSTRUCTIONS
Patient education: Achalasia (Beyond the Basics)     Author:   Dao García MD   Section :   GLENYS Gifford MD   [place] :   Eliana Stern MD, BOBBY, FACG   Contributor Disclosures   All topics are updated as new evidence becomes available and our peer review process is complete.   Literature review current through: May 2018.  This topic last updated: Aug 28, 2017.     ACHALASIA OVERVIEW - Achalasia is an uncommon swallowing disorder that affects about 1 in every 100,000 people. The major symptom of achalasia is usually difficulty with swallowing. Most people are diagnosed between the ages of 25 and 60 years. Although the condition cannot be cured, the symptoms can usually be controlled with treatment.     ACHALASIA CAUSE - In achalasia, nerve cells in the esophagus (the tube that carries food from the mouth to the stomach) degenerate for reasons that are not known. The loss of nerve cells in the esophagus causes two major problems that interfere with swallowing (figure 1):   The muscles that line the esophagus do not contract normally, so that swallowed food is not propelled through the esophagus and into the stomach properly.   The lower esophageal sphincter (LES), a band of muscle that encircles the lower portion of the esophagus, does not function correctly.     Normally, the LES relaxes when we swallow to allow swallowed food to enter the stomach. When the food has moved through the esophagus into the stomach, the LES muscle contracts to squeeze the end of the esophagus closed, thus preventing the stomach contents from flowing backwards (refluxing) into the esophagus.     In people with achalasia, the LES fails to relax normally with swallowing. Instead, the LES muscle continues to squeeze the end of the esophagus, creating a barrier that prevents food and liquids from passing into the stomach (figure 2). Over time, the esophagus above the persistently contracted LES dilates, and large  "volumes of food and saliva can accumulate in the dilated esophagus.     ACHALASIA SYMPTOMS - The most common symptom of achalasia is difficulty swallowing. Patients often experience the sensation that swallowed material, both solids and liquids, gets stuck in the chest. This problem often begins slowly and progresses gradually. Many people do not seek help until symptoms are advanced. Some people compensate by eating more slowly and by using maneuvers, such as lifting the neck or throwing the shoulders back, to improve emptying of the esophagus.   Other symptoms can include chest pain, regurgitation of swallowed food and liquid, heartburn, difficulty burping, a sensation of fullness or a lump in the throat, hiccups, and weight loss.     ACHALASIA DIAGNOSIS - Achalasia may be suspected based upon symptoms, but tests are needed to confirm the diagnosis.   Chest x-rays - A chest x-ray may reveal a dilated esophagus and absence of air in the stomach. However, a chest x-ray is not adequate for a diagnosis of achalasia and further testing is required.   Barium swallow test - The barium swallow test is a common screening test for achalasia. The test involves swallowing a chalky-tasting, thick mixture of barium while x-rays are taken. The barium shows the outline of the esophagus and lower esophageal sphincter (LES) (figure 2). Characteristic findings of achalasia on barium swallow include a persistently narrowed region at the end of the esophagus (the LES), with a dilated esophagus above the narrowed region. The barium swallow may also show spastic contractions in the esophagus above the LES, a condition called "vigorous achalasia".   Esophageal manometry (also called esophageal motility study) - Manometry is a test that measures changes in pressures within the esophagus that are caused by the contraction of the muscles that line the esophagus. The test involves the passage of a thin tube through the mouth or nose into the " "esophagus. The tube is lined by numerous pressure sensors that convey pressures within the esophagus to a device that records those pressures. Patients are usually instructed to have nothing to eat or drink for eight hours before the test, and they are given sips of water to swallow while the tube is in place.   Manometry is almost always used to confirm the diagnosis of achalasia. The test typically reveals three abnormalities in people with achalasia: high pressure in the LES at rest, failure of the LES to relax after swallowing, and an absence of useful (peristaltic) contractions in the lower esophagus. The last two features are the most important and are required to make the diagnosis of achalasia.   Endoscopy - Endoscopy allows the physician to see the inside of the esophagus, LES, and stomach using a thin, lighted, flexible tube. Most patients are given sedatives during the endoscopy procedure. This test is usually recommended for people with suspected achalasia and is especially useful for detecting other conditions that can mimic achalasia such as cancer of the upper portion of the stomach. (See "Patient education: Upper endoscopy (Beyond the Basics)".)   In people with achalasia, endoscopy often reveals a dilated esophagus that contains retained food; it may also reveal inflammation, small ulcers caused by residual food or pills, and candida (yeast) infection.   The endoscope can be advanced through the LES and into the stomach to check for stomach cancer. Cancer in the upper part of the stomach can produce symptoms and abnormal manometry results that are virtually identical to those of achalasia. This condition is called pseudoachalasia (meaning "false" achalasia) or secondary achalasia. Biopsies (small samples of tissue) are often obtained in the lower portion of the esophagus to look for cancer cells. Having a biopsy of the esophagus taken during endoscopy is not painful and is generally a safe " procedure.     ACHALASIA TREATMENT - Several options are available for the treatment of achalasia. Unfortunately, none can stop or reverse the underlying loss of nerve cells in the esophagus of patients with achalasia. However, the treatments are usually effective for improving symptoms.   None of the available treatments are expected to restore normal (peristaltic) contractions in the esophagus of patients with achalasia. Rather, the treatments aim to weaken the lower esophageal sphincter (LES) muscle to the point that it no longer poses a barrier to the passage of food. The LES can be weakened by drugs, or mechanically by procedures that tear or cut the LES muscle.   Drug therapy - Two classes of drugs, nitrates and calcium channel blockers, have LES muscle-relaxing effects. These drugs can decrease symptoms in people with achalasia. The drugs are usually taken by placing a pill under the tongue 10 to 30 minutes before meals.     Drug therapy is the least invasive option for treating achalasia. However, most people find that long-term drug therapy is inconvenient, ineffective, and often associated with unpleasant side effects, such as headache and low blood pressure. Furthermore, the drugs tend to become less effective over time. For these reasons, medications are recommended primarily for patients who are not interested in or not healthy enough for mechanical treatments such as balloon dilation and surgery (myotomy).     Balloon dilation (pneumatic dilatation) - For balloon dilation, the patient swallows a collapsed balloon that is positioned in the LES. An x-ray machine is often used to guide placement of the balloon. When the balloon has been positioned at the LES, it is inflated abruptly to a large size in order to tear the muscle of the LES. This procedure is effective for relieving the swallowing difficulty in patients with achalasia in approximately two-thirds of people, although chest pain persists in  some. Patients frequently require more than one balloon dilation treatment for adequate relief.   Procedure - If you have balloon dilation, you will be asked to drink only liquids for 12 hours to two days in advance (a longer period is recommended if you have a great deal of food retention in the esophagus). Using endoscopy and fluoroscopy (x-ray), a physician advances a guide wire down the esophagus and positions it inside the LES. A deflated balloon is then advanced along this guide wire, positioned inside the LES, and inflated for a variable period ranging from seconds to minutes. The balloon is then deflated and withdrawn, and you are monitored in a recovery area for a number of hours to detect any complications. After the balloon dilation, some physicians routinely perform an x-ray test similar to the barium swallow described above to make sure that the balloon has not created a hole (perforation) in the esophagus. If there are no complications, you can usually resume eating when you have recovered from the procedure. If your day-to-day symptoms do not improve, additional   dilations can be performed.   Success rate - A single balloon dilation session continues to relieve symptoms of achalasia in about 60 percent of people one year after the procedure and in about 25 percent of people five years after the procedure. Higher success rates have been reported in some studies. The success rate after longer periods has not been well studied, but some people have remained symptom-free for as long as 25 years.   Complications - About 15 percent of people experience severe chest pain immediately after balloon dilation and some experience fever.   The most serious complication of balloon dilation is creation of a hole (perforation) in the wall of the esophagus; this complication occurs in about 2 to 5 percent of people undergoing the procedure. Symptoms of persistent or worsening pain in the hours after the procedure may  indicate a perforation.     Perforations of the esophagus after balloon dilation are usually small. For the treatment of small perforations, your doctor will probably admit you to the hospital for intravenous feeding and antibiotic treatment. This usually results in healing of the perforation within one week without surgery. Large perforations usually require emergency surgery for repair. In some cases, your doctor might recommend surgery even for a small perforation. Another option for treatment of small perforations is the placement of an esophageal stent, which is a short plastic tube that is positioned in the esophagus to seal the perforation. The stent prevents swallowed material from entering the perforation, which enables the perforation to heal, but allows swallowed material to pass through the stent into the stomach. A key factor in the successful treatment of perforation of the esophagus is rapid identification of the perforation and rapid implementation of treatment. You should call your physician   immediately if you experience increasing pain after balloon dilation, especially if you develop a fever or chills.   Bleeding is another important, but infrequent complication of balloon dilation. This complication usually occurs immediately after the dilation. Symptoms of bleeding include dizziness or fainting, especially on standing up, vomiting of blood or material that looks like coffee grounds, and the passage of black or bloody stools. You should notify your doctor immediately if you experience these symptoms.   Patients also can develop gastroesophageal reflux disease (GERD) after balloon dilation. Because the LES is the principal barrier that prevents stomach contents from refluxing (backwashing) into the esophagus, LES disruption by balloon dilation can lead to acid reflux. GERD occurs in about 2 percent of people after balloon dilation, but is usually easily controlled with acid-reducing medications.  "(See "Patient education: Acid reflux (gastroesophageal reflux disease) in adults (Beyond the Basics)".)     Surgery (myotomy) - Myotomy is an operation that is used to weaken the LES by cutting its muscle fibers. The most common surgical technique used to treat achalasia is called the Heller myotomy, in which the surgeon cuts the muscles at the end of the esophagus and at the top of the stomach. In the past, this surgery was performed through a large (open) incision in the chest or abdomen. Today, this surgery is usually performed laparoscopically, using instruments and a television camera that are passed into the abdomen through small abdominal incisions. People who undergo laparoscopic myotomy are given general anesthesia, and generally stay in the hospital for one to two days.   Success rate - Surgery relieves symptoms in 70 to 90 percent of people. Symptom relief is sustained in about 85 percent of people 10 years after surgery and in about 65 percent of people 20 years after the surgery. Thus, some consider surgery to be a more definitive treatment for achalasia than balloon dilation or botulinum toxin injection (see below).   Complications - Postoperative pain is expected, and is treated with pain medications. Like balloon dilation, there is a risk of acid reflux following myotomy, which can cause damage to the esophagus over time.   During the operation for myotomy, surgeons often perform an additional procedure called a fundoplication in which a portion of the stomach is wrapped around the esophagus to prevent the reflux of stomach contents (figure 3). However, the fundoplication does not always prevent reflux, and it can cause additional complications such as difficulty swallowing, bloating, flatulence, and diarrhea. (See "Patient education: Acid reflux (gastroesophageal reflux disease) in adults (Beyond the Basics)".)     Peroral endoscopy myotomy - Peroral endoscopic myotomy (POEM) is a newer endoscopic " technique for performing myotomy of the LES. In POEM, the endoscopist passes an electrical scalpel through the endoscope to make an incision in the lining of the esophagus and to create a tunnel within the wall of the esophagus (between the inner lining of the esophagus and the outer muscle layer of the esophagus). The endoscope is advanced into that tunnel, and the muscle of the esophagus can be cut using an electrical scalpel device that is passed through the endoscope. Early studies suggest that POEM can achieve results comparable to or even better than those of pneumatic dilation and surgical myotomy, and with similar safety. However, POEM is a newer procedure, and little is known of its long-term outcome. Since POEM is not routinely combined with a procedure to prevent the reflux of gastric contents, problematic GERD can occur after POEM. Presently, POEM is being performed in a limited number of   centers by a small number of endoscopists, and the procedure may not be widely available. Differences in the experience and skill of the endoscopists who perform POEM also might have considerable influence on the outcome of the procedure.     Botulinum toxin injection - Botulinum toxin injections temporarily paralyze the nerves that signal the LES to contract, thereby helping to relieve the obstruction. Botulinum toxin injection also is used occasionally as a diagnostic test for people who appear to have achalasia but who have inconclusive test results.   Procedure - The injection procedure is performed during endoscopy, while the patient is sedated. The botulinum toxin is injected through the lining of the esophagus directly into the LES muscle.   Success rate - A single botulinum toxin injection session relieves symptoms in 65 to 90 percent of people in the short term (three months to approximately one year). Additional injections can relieve symptoms in patients whose symptoms return. Botulinum toxin injection is  "more likely to be effective in people over the age of 50 years and in people who have the vigorous form of achalasia.   When compared with balloon dilation, botulinum toxin has a similar effectiveness for relieving symptoms in the first one to two years after the procedure; however, prolonged effectiveness requires multiple botulinum toxin injections because the paralyzing effect of the toxin is temporary. The long-term safety and effectiveness of botulinum toxin injection are unknown.   Complications - About 25 percent of people have chest pain for a few hours after the procedure and about 5 percent develop heartburn. Damage to the esophageal wall and lining are rare. The short-term safety of botulinum toxin injection is greater than the short-term safety of both balloon dilation and surgery; this greater short-term safety may make botulinum toxin injection a better choice for patients with other serious medical conditions (eg, advanced age, severe heart or lung problems) who cannot tolerate a balloon dilation or myotomy.     LONG-TERM RISK OF ESOPHAGEAL CANCER - People with achalasia have an increased risk of developing esophageal cancer. Your doctor might recommend endoscopy for early detection of this cancer. (See "Patient education: Upper endoscopy (Beyond the Basics)".)     ACHALASIA FOLLOW-UP - Since none of the treatments for achalasia cure the underlying disease, regular follow-up is needed. The goal is to recognize and treat recurrent symptoms or complications of treatment (eg, acid reflux) early. Recognizing and treating these problems can help to prevent the development of severe enlargement of the esophagus (carol-esophagus) and cancer, which could require surgical removal of the entire esophagus.    "

## 2023-01-31 NOTE — PROGRESS NOTES
The patient location is: home  The chief complaint leading to consultation is: dysphagia, regurgitation     Visit type: audiovisual    Face to Face time with patient: 35  40 minutes of total time spent on the encounter, which includes face to face time and non-face to face time preparing to see the patient (eg, review of tests), Obtaining and/or reviewing separately obtained history, Documenting clinical information in the electronic or other health record, Independently interpreting results (not separately reported) and communicating results to the patient/family/caregiver, or Care coordination (not separately reported).         Each patient to whom he or she provides medical services by telemedicine is:  (1) informed of the relationship between the physician and patient and the respective role of any other health care provider with respect to management of the patient; and (2) notified that he or she may decline to receive medical services by telemedicine and may withdraw from such care at any time.    Notes:          [facility] Consultation Note    Reason for Consult:  No chief complaint on file.          PCP:   Flavio Miller       Referring MD:  [place]  [id number] [place] [address]      HPI:  Morris Parada is a 67 y.o. male referred to motility clinic for second opinion regarding the following problems:    No pyrosis   Regurgitation: occasional     Dysphagia.    Problems with solids: w every meal   Problems with liquids: no   Location: upper esophagus     Had dysphagia in childhood.  Told that food was staying in esophagus, casued lung damage   Self dilations at home   R partial pneumonectomy and possible HM 11yo  Significant improvement w HM, but required liquids to help push food down   Symptoms started getting worse about 20 years ago, sever in the past few years     DIET: regular diet diet w lots of liquid  No chest pain    No weight loss      Eckardt Symptom Score  Dysphagia: 3  Regurgitation: 1  Retrosternal pain: 0  Weight Loss: 0  Total: 4    Early satiety. Occasional   Sm frequent meals   EGD w food in the stomach          Denies nausea, vomiting, early satiety, abdominal pain, bloating, diarrhea, constipation, BRBPR, melena, weight loss.       Total visit time was 40 minutes, more than 50% of which was spent in face-to-face counseling with patient regarding symptoms, diagnostic results, prognosis, risks and benefits of treatment options, instructions for management, importance of compliance with chosen treatment options and coping strategies.      Previous Studies:   Manometry 01/12/2023:  Normal LES pressure with incomplete relaxation with 20% pan esophageal pressurization.  Achalasia type 2.  Artifact in distal esophagus possibly related to diverticulum.  Incomplete bolus clearance.  Abnormal multiple rapid swallow test.  No significant difference with provocative maneuvers.  Evidence of residual liquid at the end of 200 cc bolus  EGD 01/10/2023:  Dilation in the entire esophagus.  Fluid in the esophagus.  Atrophic mucosa in the esophagus (negative).  Z-line regular 49 cm.  Puckering, no resistance no pop at GE junction.  Large diverticulum in lower 3rd of the esophagus proximal to GE junction.  Normal stomach.  Normal duodenum.  Flip with normal distensibility.  Manometry catheter placed under endoscopic visualization without difficulty.  Timed barium swallow 11/14/2022:  Prolonged stasi of tablet at GE junction.  Not witnessed to have passed into the stomach.  11 cm at 0 minutes, 9 cm at 1 minute,  9 cm at 2 minutes, 9 cm at 5 minutes  Manometry 07/06/2022: Absent peristalsis.  IRP 4.7.  20% pan esophageal pressurization.  100% failed.  0% normal.  Complete bolus clearance.  Three of 5 upright swallows without obstruction to swallows with we contraction.  Upright IRP 14.5.  Rapid and solids were  EGD 07/06/2022:  Z-line regular at 45.   Dilation the entire esophagus.  In the middle 3rd of the esophagus in the lower 3rd of the esophagus.  Medium amount of food in the stomach.  Normal duodenal bulb and 2nd portion of duodenum.  EGD 07/24/2021: Food in the middle 3rd of the esophagus.  Dilation in middle 3rd of the esophagus.  Large amount of food in the stomach.  Chronic gastritis (acute ischemic gastritis with ulceration and moderate to marked acute and chronic inflammation arising in a background of chemical gastritis.  No HP.  No intestinal metaplasia or dysplasia.).  Normal duodenum.  Benign appearing esophageal stenosis.    Relevant Surgical History:    R partial pneumonectomy and possible HM 11yo    ROS:  ROS     Complete ROS negative except as above    Medical History:   Past Medical History:   Diagnosis Date    Dysphagia     Esophageal diverticulum     Helicobacter pylori ab+     Hyperlipidemia     Pityriasis rosea         Surgical History:   Past Surgical History:   Procedure Laterality Date    ESOPHAGEAL DILATION      ESOPHAGEAL MANOMETRY WITH MEASUREMENT OF IMPEDANCE N/A 1/10/2023    Procedure: MANOMETRY-ESOPHAGEAL-WITH IMPEDANCE;  Surgeon: Kiersten Chauhan MD;  Location: [address]);  Service: Endoscopy;  Laterality: N/A;    ESOPHAGEAL MOTILITY STUDY USING HIGH RESOLUTION MANOMETRY N/A 7/6/2022    Procedure: MOTILITY STUDY, ESOPHAGUS, USING HIGH RESOLUTION MANOMETRY;  Surgeon: Jaqui Shelton MD;  Location: [facility];  Service: Endoscopy;  Laterality: N/A;    ESOPHAGOGASTRODUODENOSCOPY N/A 07/24/2021    Procedure: EGD (ESOPHAGOGASTRODUODENOSCOPY);  Surgeon: Felipe Briscoe III, MD;  Location: [facility];  Service: Endoscopy;  Laterality: N/A;    ESOPHAGOGASTRODUODENOSCOPY N/A 7/6/2022    Procedure: EGD (ESOPHAGOGASTRODUODENOSCOPY);  Surgeon: Jaqui Shelton MD;  Location: [facility];  Service: Endoscopy;  Laterality: N/A;    ESOPHAGOGASTRODUODENOSCOPY N/A 1/10/2023    Procedure: ESOPHAGOGASTRODUODENOSCOPY (EGD);   Surgeon: Kiersten Chauhan MD;  [facility]  Service: Endoscopy;  Laterality: N/A;  Endoflip/Endoscopically placed manometry probe  2nd floor-End stage achalasia  propofol only  full liquid diet x3 days, clear liquid diet x1 day prior to procedure  instructions [facility] to [facility]    LUNG SURGERY      age 12, partial pneumonectomy        Family History:   Family History   Problem Relation Age of Onset    Diabetes Mother     Heart disease Mother     Intracerebral hemorrhage Father     Hyperlipidemia Sister     Hyperlipidemia Sister     Breast cancer Maternal Grandmother     Esophageal cancer Neg Hx     Stomach cancer Neg Hx     Colon cancer Neg Hx     Colon polyps Neg Hx     Rectal cancer Neg Hx     Liver cancer Neg Hx     Pancreatic cancer Neg Hx     Irritable bowel syndrome Neg Hx     Celiac disease Neg Hx     Crohn's disease Neg Hx         Social History:   Social History     Socioeconomic History    Marital status:     Number of children: 3   Occupational History     Employer: [employer]    Occupation: [employer]   Tobacco Use    Smoking status: Never    Smokeless tobacco: Never   Substance and Sexual Activity    Alcohol use: Yes     Comment: rare    Drug use: No    Sexual activity: Yes     Partners: Female   Social History Narrative    ** Merged History Encounter **             Review of patient's allergies indicates:  No Known Allergies    Current Outpatient Medications   Medication Sig Dispense Refill    atorvastatin (LIPITOR) 40 MG tablet Take 1 tablet (40 mg total) by mouth once daily. 90 tablet 3    cetirizine (ZYRTEC) 10 MG tablet Take 10 mg by mouth once daily.      sildenafiL (VIAGRA) 100 MG tablet Take 1 tablet (100 mg total) by mouth daily as needed for Erectile Dysfunction. 10 tablet 11     No current facility-administered medications for this visit.        Objective Findings:  Vital Signs:  There were no vitals taken for this visit.  There is no height or weight on  file to calculate BMI.    Physical Exam:  Physical Exam:limited due to video visit  General appearance: alert, cooperative, no distress  HENT: Normocephalic, atraumatic, neck symmetrical, no nasal discharge  Eyes: conjunctivae/corneas clear,  EOM's intact  Extremities: visible extremities symmetric; no clubbing, cyanosis, or edema  Integument: visible Skin color, texture, turgor normal; no rashes; hair distrubution normal  Neurologic: Alert and oriented X 3,  Psychiatric: no pressured speech; normal affect; no evidence of impaired cognition      Labs:   Reviewed in Epic/record      Assessment and Plan:  Morris Parada is a 67 y.o. male with referred to [facility]ophageal [facility]ility Clinic for 2nd opinion regarding following problems:    Regurgitation: occasional     Dysphagia.    Regurgitation   Achalasia Type II   Esophageal diverticulum   Eckardt 4/12   Symptoms started in childhood  Partial pneumonectomy and possible HM 11yo in [place]  Significant improvement w HM, but required liquids to help push food down   Symptoms started getting worse about 20 years ago, severe in the past few years   Food impaction 7/2021  EGD w dilated food filled esophagus, puckering ar GEJ, no resistance or pop, diverticulum in distal esophagus  Flip w nl distensibility   Manometry w Type II achalasia, incomplete bolus clearance, 200cc residual   TBS w delay of tablet and barium at GEJ, 9cm colum at 5 min  -Discussed pathophysiology, presentation and treatment of achalasia, including botox, pneumatic dilation, myotomy, and POEM.  Discuss treatment of diverticulum.    -Refer to Dr. Chance or Dr. Hollingsworth to discuss treatment options, risks and benefits.  Will review w Dr. Chance     Early satiety  EGD w food in the stomach x2         HP  IGG + 2010  PT not sure if he got tx  No Hp on recent bx  -Def to primary GI     Anemia.  Not MARYSE  -Def to primary GI      CRC screening. Negative 2014.  No fam hx CRC   -Repeat C-scope 11/2024  -Def to primary GI       R partial pneumonectomy at 13yo    Follow up in about 6 months (around 7/31/2023).    1. Dysphagia, unspecified type    2. Gastroesophageal reflux disease, unspecified whether esophagitis present    3. Achalasia            Order summary:         Discussed with PT that I act as a consult service and do not accept patients to be their primary GI provider. Discussed that the goal of our visits is to address relevant motility problems while deferring other GI problems as well as screening and surveillance to his/her primary GI provider.   Discussed that he/she needs to continue to follow with his local primary GI provider.  Discussed that we will complete his/her workup, clarify diagnosis and attempt to optimize his/her symptoms with intention of him/her returning to referring GI provider for long term GI care.   Pt verbalized understanding.        Thank you so much for allowing me to participate in the care of Morris Chauhan MD      This note was created using voice recognition software, and may contain some unrecognized transcriptional errors.

## 2023-01-31 NOTE — TELEPHONE ENCOUNTER
----- Message from Anabela Chance MD sent at 1/31/2023 11:22 AM CST -----  That diverticulum is quite large, so much so that I would favor not resecting it at time of redo myotomy. I suspect that with his prior pneumonectomy a transabdominal approach would be better. I would send him to our clinic first. We should also present him at swallow conference.    ----- Message -----  From: Kiersten Chauhan MD  Sent: 1/31/2023   9:58 AM CST  To: Kiersten Chauhan MD, #    Hope you are doing well.  Please let me know if you think I should refer this patient to you or [facility].  Patient has a diverticulum and I am not sure if abdominal or thoracic approach would be best.  Thanks  Leatha    PT w ho partial pneumonectomy and possible HM at 13yo in [place] p/w dysphagia, regurgitation  Eckardt 4/12   Significant improvement w HM, but required liquids to help push food down   Symptoms started getting worse about 20 years ago, severe in the past few years   Food impaction 7/2021  EGD w dilated food filled esophagus, puckering ar GEJ, no resistance or pop, diverticulum in distal esophagus  Flip w nl distensibility   Manometry w Type II achalasia, incomplete bolus clearance, 200cc residual   TBS w delay of tablet and barium at GEJ, 9cm colum at 5 min

## 2023-02-07 DIAGNOSIS — Z00.00 ENCOUNTER FOR MEDICARE ANNUAL WELLNESS EXAM: ICD-10-CM

## 2023-02-09 DIAGNOSIS — Z00.00 ENCOUNTER FOR MEDICARE ANNUAL WELLNESS EXAM: ICD-10-CM

## 2023-02-13 ENCOUNTER — PATIENT MESSAGE (OUTPATIENT)
Dept: GASTROENTEROLOGY | Facility: CLINIC | Age: 68
End: 2023-02-13
Payer: MEDICARE

## 2023-02-28 ENCOUNTER — OFFICE VISIT (OUTPATIENT)
Dept: SURGERY | Facility: CLINIC | Age: 68
End: 2023-02-28
Payer: MEDICARE

## 2023-02-28 VITALS
DIASTOLIC BLOOD PRESSURE: 77 MMHG | BODY MASS INDEX: 25.68 KG/M2 | SYSTOLIC BLOOD PRESSURE: 132 MMHG | HEIGHT: 70 IN | WEIGHT: 179.38 LBS | HEART RATE: 65 BPM

## 2023-02-28 DIAGNOSIS — Q39.6 ESOPHAGEAL DIVERTICULUM: ICD-10-CM

## 2023-02-28 DIAGNOSIS — K22.0 ACHALASIA: Primary | ICD-10-CM

## 2023-02-28 DIAGNOSIS — E78.2 MIXED HYPERLIPIDEMIA: ICD-10-CM

## 2023-02-28 PROCEDURE — 1101F PT FALLS ASSESS-DOCD LE1/YR: CPT | Mod: HCNC,CPTII,S$GLB, | Performed by: SURGERY

## 2023-02-28 PROCEDURE — 1160F RVW MEDS BY RX/DR IN RCRD: CPT | Mod: HCNC,CPTII,S$GLB, | Performed by: SURGERY

## 2023-02-28 PROCEDURE — 99205 PR OFFICE/OUTPT VISIT, NEW, LEVL V, 60-74 MIN: ICD-10-PCS | Mod: HCNC,S$GLB,, | Performed by: SURGERY

## 2023-02-28 PROCEDURE — 3078F DIAST BP <80 MM HG: CPT | Mod: HCNC,CPTII,S$GLB, | Performed by: SURGERY

## 2023-02-28 PROCEDURE — 1126F PR PAIN SEVERITY QUANTIFIED, NO PAIN PRESENT: ICD-10-PCS | Mod: HCNC,CPTII,S$GLB, | Performed by: SURGERY

## 2023-02-28 PROCEDURE — 99205 OFFICE O/P NEW HI 60 MIN: CPT | Mod: HCNC,S$GLB,, | Performed by: SURGERY

## 2023-02-28 PROCEDURE — 99999 PR PBB SHADOW E&M-EST. PATIENT-LVL IV: ICD-10-PCS | Mod: PBBFAC,HCNC,, | Performed by: SURGERY

## 2023-02-28 PROCEDURE — 3008F PR BODY MASS INDEX (BMI) DOCUMENTED: ICD-10-PCS | Mod: HCNC,CPTII,S$GLB, | Performed by: SURGERY

## 2023-02-28 PROCEDURE — 3075F PR MOST RECENT SYSTOLIC BLOOD PRESS GE 130-139MM HG: ICD-10-PCS | Mod: HCNC,CPTII,S$GLB, | Performed by: SURGERY

## 2023-02-28 PROCEDURE — 3288F PR FALLS RISK ASSESSMENT DOCUMENTED: ICD-10-PCS | Mod: HCNC,CPTII,S$GLB, | Performed by: SURGERY

## 2023-02-28 PROCEDURE — 3008F BODY MASS INDEX DOCD: CPT | Mod: HCNC,CPTII,S$GLB, | Performed by: SURGERY

## 2023-02-28 PROCEDURE — 3078F PR MOST RECENT DIASTOLIC BLOOD PRESSURE < 80 MM HG: ICD-10-PCS | Mod: HCNC,CPTII,S$GLB, | Performed by: SURGERY

## 2023-02-28 PROCEDURE — 1159F PR MEDICATION LIST DOCUMENTED IN MEDICAL RECORD: ICD-10-PCS | Mod: HCNC,CPTII,S$GLB, | Performed by: SURGERY

## 2023-02-28 PROCEDURE — 3288F FALL RISK ASSESSMENT DOCD: CPT | Mod: HCNC,CPTII,S$GLB, | Performed by: SURGERY

## 2023-02-28 PROCEDURE — 1160F PR REVIEW ALL MEDS BY PRESCRIBER/CLIN PHARMACIST DOCUMENTED: ICD-10-PCS | Mod: HCNC,CPTII,S$GLB, | Performed by: SURGERY

## 2023-02-28 PROCEDURE — 3075F SYST BP GE 130 - 139MM HG: CPT | Mod: HCNC,CPTII,S$GLB, | Performed by: SURGERY

## 2023-02-28 PROCEDURE — 1126F AMNT PAIN NOTED NONE PRSNT: CPT | Mod: HCNC,CPTII,S$GLB, | Performed by: SURGERY

## 2023-02-28 PROCEDURE — 99999 PR PBB SHADOW E&M-EST. PATIENT-LVL IV: CPT | Mod: PBBFAC,HCNC,, | Performed by: SURGERY

## 2023-02-28 PROCEDURE — 1101F PR PT FALLS ASSESS DOC 0-1 FALLS W/OUT INJ PAST YR: ICD-10-PCS | Mod: HCNC,CPTII,S$GLB, | Performed by: SURGERY

## 2023-02-28 PROCEDURE — 1159F MED LIST DOCD IN RCRD: CPT | Mod: HCNC,CPTII,S$GLB, | Performed by: SURGERY

## 2023-02-28 NOTE — PROGRESS NOTES
General Surgery   History & Physical    SUBJECTIVE:   No chief complaint on file.    History of Present Illness:  Morris Parada is a 67 y.o. male with a PMH of HLD, dysphagia, achalasia s/p open heller myotomy and right lower lobectomy via right thoracotomy when he was 12 years old, and known distal esophageal diverticulum. He presents to clinic today with complaints of worsening dysphagia. He states that he had good results from his initial surgery, but approximately twenty years ago he began to have intermittent symptoms of dysphagia. These symptoms have progressed significantly since that time. He now has dysphagia to all solid foods requiring him to limit what and when he eats. He has required multiple EGDs for food disimpaction and states that it takes him a significant amount of time to digest foods, particularly meats. He has been evaluated by our gastroenterology team who performed an EGD revealing a large distal esophageal diverticulum. Endoflip and esophageal manometry were also performed revealing high resting LES pressure and 20% pan-esophageal pressurization consistent with Type 2 Achalasia. He would like to discuss further options regarding his achalasia.     Eckardt Score:     Dysphagia - 3 (0=none, 1=occasional, 2=daily, 3=each meal)  Chest Pain - 1 (0=none, 1=occasional, 2=daily, 3=each meal)  Regurgitation - 1 (0=none, 1=occasional, 2=daily, 3=each meal)  Weight Loss - 0 (0=none, 1= <5kg, 2= 5-10kg, 3= >10kg)     Score Total: 5 (0-12)     Review of patient's allergies indicates:  No Known Allergies    Current Outpatient Medications   Medication Sig Dispense Refill    atorvastatin (LIPITOR) 40 MG tablet Take 1 tablet (40 mg total) by mouth once daily. 90 tablet 3    cetirizine (ZYRTEC) 10 MG tablet Take 10 mg by mouth once daily.      sildenafiL (VIAGRA) 100 MG tablet Take 1 tablet (100 mg total) by mouth daily as needed for Erectile Dysfunction. 10 tablet 11     No current facility-administered  medications for this visit.       Past Medical History:   Diagnosis Date    Dysphagia     Esophageal diverticulum     Helicobacter pylori ab+     Hyperlipidemia     Pityriasis rosea      Past Surgical History:   Procedure Laterality Date    ESOPHAGEAL DILATION      ESOPHAGEAL MANOMETRY WITH MEASUREMENT OF IMPEDANCE N/A 1/10/2023    Procedure: MANOMETRY-ESOPHAGEAL-WITH IMPEDANCE;  Surgeon: Kiersten Chauhan MD;  [facility]  Service: Endoscopy;  Laterality: N/A;    ESOPHAGEAL MOTILITY STUDY USING HIGH RESOLUTION MANOMETRY N/A 7/6/2022    Procedure: MOTILITY STUDY, ESOPHAGUS, USING HIGH RESOLUTION MANOMETRY;  Surgeon: Jaqui Shelton MD;  Location: [facility];  Service: Endoscopy;  Laterality: N/A;    ESOPHAGOGASTRODUODENOSCOPY N/A 07/24/2021    Procedure: EGD (ESOPHAGOGASTRODUODENOSCOPY);  Surgeon: Felipe Briscoe III, MD;  Location: [facility];  Service: Endoscopy;  Laterality: N/A;    ESOPHAGOGASTRODUODENOSCOPY N/A 7/6/2022    Procedure: EGD (ESOPHAGOGASTRODUODENOSCOPY);  Surgeon: Jaqui Shelton MD;  Location: [facility];  Service: Endoscopy;  Laterality: N/A;    ESOPHAGOGASTRODUODENOSCOPY N/A 1/10/2023    Procedure: ESOPHAGOGASTRODUODENOSCOPY (EGD);  Surgeon: Kiersten Chauhan MD;  [facility] ([address] [place] [address]);  Service: Endoscopy;  Laterality: N/A;  Endoflip/Endoscopically placed manometry probe  2nd floor-End stage achalasia  propofol only  full liquid diet x3 days, clear liquid diet x1 day prior to procedure  instructions [facility] to [facility]    LUNG SURGERY      age 12, partial pneumonectomy     Family History   Problem Relation Age of Onset    Diabetes Mother     Heart disease Mother     Intracerebral hemorrhage Father     Hyperlipidemia Sister     Hyperlipidemia Sister     Breast cancer Maternal Grandmother     Esophageal cancer Neg Hx     Stomach cancer Neg Hx     Colon cancer Neg Hx     Colon polyps Neg Hx     Rectal cancer Neg Hx     Liver cancer Neg Hx      "Pancreatic cancer Neg Hx     Irritable bowel syndrome Neg Hx     Celiac disease Neg Hx     Crohn's disease Neg Hx      Social History     Tobacco Use    Smoking status: Never    Smokeless tobacco: Never   Substance Use Topics    Alcohol use: Yes     Comment: rare    Drug use: No        Review of Systems:  Review of Systems   Constitutional: Negative.    HENT:  Positive for trouble swallowing.    Respiratory: Negative.     Cardiovascular: Negative.    Gastrointestinal:  Positive for vomiting. Negative for abdominal distention, abdominal pain and nausea.   Endocrine: Negative.    Genitourinary: Negative.    Musculoskeletal: Negative.    Neurological: Negative.    Hematological: Negative.    Psychiatric/Behavioral: Negative.       OBJECTIVE:   Vital Signs (Most Recent)  Pulse: 65 (02/28/23 1452)  BP: 132/77 (02/28/23 1452)  5' 10" (1.778 m)  81.4 kg (179 lb 5.5 oz)   Physical Exam:  Physical Exam  Vitals and nursing note reviewed.   Constitutional:       Appearance: Normal appearance. He is not ill-appearing.   HENT:      Head: Normocephalic.      Mouth/Throat:      Mouth: Mucous membranes are moist.      Pharynx: Oropharynx is clear.   Eyes:      General: No scleral icterus.     Extraocular Movements: Extraocular movements intact.      Conjunctiva/sclera: Conjunctivae normal.   Cardiovascular:      Rate and Rhythm: Normal rate.      Pulses: Normal pulses.   Pulmonary:      Effort: Pulmonary effort is normal. No respiratory distress.      Breath sounds: No wheezing.   Chest:      Comments: Previous right thoracotomy incision well healed  Abdominal:      General: Abdomen is flat. Bowel sounds are normal. There is no distension.      Palpations: Abdomen is soft.      Comments: Previous midline laparotomy incision well healed   Musculoskeletal:         General: No swelling or tenderness. Normal range of motion.      Cervical back: Normal range of motion.   Skin:     General: Skin is warm and dry.      Coloration: Skin is " not jaundiced or pale.   Neurological:      General: No focal deficit present.      Mental Status: He is alert and oriented to person, place, and time.   Psychiatric:         Mood and Affect: Mood normal.       Laboratory  Available labs reviewed.    Diagnostic Results:  Available imaging and diagnostic studies reviewed.     ASSESSMENT/PLAN:     67 y.o. male with a PMH of HLD, dysphagia, achalasia s/p open heller myotomy and right lower lobectomy via right thoracotomy when he was 12 years old, and known distal esophageal diverticulum. He presents to clinic today with complaints of worsening dysphagia. Recent workup consistent with type 2 achalasia with large distal esophageal diverticulum.    PLAN:  Plan for robotic redo Heller myotomy  Consent obtained in clinic    Flavio Thompson MD  [facility] General Surgery  PGY - 3

## 2023-03-01 ENCOUNTER — TELEPHONE (OUTPATIENT)
Dept: CARDIOLOGY | Facility: HOSPITAL | Age: 68
End: 2023-03-01
Payer: MEDICARE

## 2023-03-01 NOTE — TELEPHONE ENCOUNTER
Returned call, EKG scheduled as requested.  Appointment Information   Name: SALVADOR CHRISTIAN MRN: [id number]   Date: 3/3/2023 Status: [facility]   Appt Time: 11:00 AM Length: 10   Visit Type: EKG [2099] Copay: $0.00   Provider: RONY SILVAVC Dept: [facility], Cardiology       Dept Address: [address] [place] [id number]       Dept Phone Number: [phone]   Referring Provider: CHERIE RODRIGUEZ CSN: [id number]   Appt Phone:     Notes:     Made On: 3/1/2023 2:00 PM By: WOLF HALL [[id number]] (ES

## 2023-03-01 NOTE — TELEPHONE ENCOUNTER
----- Message from Elena Abdi sent at 3/1/2023  1:49 PM CST -----  Contact: [place]/ [facility]  Kita with [facility] is calling to speak with the nurse regarding appt. Reports having orders but needing ekg scheduled. Please give patient a call back at [phone].

## 2023-03-03 ENCOUNTER — HOSPITAL ENCOUNTER (OUTPATIENT)
Dept: CARDIOLOGY | Facility: HOSPITAL | Age: 68
Discharge: HOME | End: 2023-03-03
Attending: INTERNAL MEDICINE
Payer: MEDICARE

## 2023-03-03 DIAGNOSIS — K22.0 ACHALASIA: ICD-10-CM

## 2023-03-03 PROCEDURE — 93010 EKG 12-LEAD: ICD-10-PCS | Mod: HCNC,,, | Performed by: INTERNAL MEDICINE

## 2023-03-03 PROCEDURE — 93005 ELECTROCARDIOGRAM TRACING: CPT | Mod: HCNC

## 2023-03-03 PROCEDURE — 93010 ELECTROCARDIOGRAM REPORT: CPT | Mod: HCNC,,, | Performed by: INTERNAL MEDICINE

## 2023-03-10 ENCOUNTER — OFFICE VISIT (OUTPATIENT)
Dept: UROLOGY | Facility: CLINIC | Age: 68
End: 2023-03-10
Payer: MEDICARE

## 2023-03-10 VITALS
HEART RATE: 83 BPM | DIASTOLIC BLOOD PRESSURE: 72 MMHG | TEMPERATURE: 98 F | WEIGHT: 181.19 LBS | BODY MASS INDEX: 25.94 KG/M2 | SYSTOLIC BLOOD PRESSURE: 127 MMHG | HEIGHT: 70 IN

## 2023-03-10 DIAGNOSIS — C61 PROSTATE CANCER: Primary | ICD-10-CM

## 2023-03-10 PROCEDURE — 99999 PR PBB SHADOW E&M-EST. PATIENT-LVL III: CPT | Mod: PBBFAC,HCNC,, | Performed by: UROLOGY

## 2023-03-10 PROCEDURE — 1159F PR MEDICATION LIST DOCUMENTED IN MEDICAL RECORD: ICD-10-PCS | Mod: HCNC,CPTII,S$GLB, | Performed by: UROLOGY

## 2023-03-10 PROCEDURE — 99214 PR OFFICE/OUTPT VISIT, EST, LEVL IV, 30-39 MIN: ICD-10-PCS | Mod: HCNC,S$GLB,, | Performed by: UROLOGY

## 2023-03-10 PROCEDURE — 1126F PR PAIN SEVERITY QUANTIFIED, NO PAIN PRESENT: ICD-10-PCS | Mod: HCNC,CPTII,S$GLB, | Performed by: UROLOGY

## 2023-03-10 PROCEDURE — 3074F SYST BP LT 130 MM HG: CPT | Mod: HCNC,CPTII,S$GLB, | Performed by: UROLOGY

## 2023-03-10 PROCEDURE — 1126F AMNT PAIN NOTED NONE PRSNT: CPT | Mod: HCNC,CPTII,S$GLB, | Performed by: UROLOGY

## 2023-03-10 PROCEDURE — 3008F BODY MASS INDEX DOCD: CPT | Mod: HCNC,CPTII,S$GLB, | Performed by: UROLOGY

## 2023-03-10 PROCEDURE — 1160F PR REVIEW ALL MEDS BY PRESCRIBER/CLIN PHARMACIST DOCUMENTED: ICD-10-PCS | Mod: HCNC,CPTII,S$GLB, | Performed by: UROLOGY

## 2023-03-10 PROCEDURE — 1160F RVW MEDS BY RX/DR IN RCRD: CPT | Mod: HCNC,CPTII,S$GLB, | Performed by: UROLOGY

## 2023-03-10 PROCEDURE — 99214 OFFICE O/P EST MOD 30 MIN: CPT | Mod: HCNC,S$GLB,, | Performed by: UROLOGY

## 2023-03-10 PROCEDURE — 1101F PR PT FALLS ASSESS DOC 0-1 FALLS W/OUT INJ PAST YR: ICD-10-PCS | Mod: HCNC,CPTII,S$GLB, | Performed by: UROLOGY

## 2023-03-10 PROCEDURE — 3008F PR BODY MASS INDEX (BMI) DOCUMENTED: ICD-10-PCS | Mod: HCNC,CPTII,S$GLB, | Performed by: UROLOGY

## 2023-03-10 PROCEDURE — 3074F PR MOST RECENT SYSTOLIC BLOOD PRESSURE < 130 MM HG: ICD-10-PCS | Mod: HCNC,CPTII,S$GLB, | Performed by: UROLOGY

## 2023-03-10 PROCEDURE — 3288F FALL RISK ASSESSMENT DOCD: CPT | Mod: HCNC,CPTII,S$GLB, | Performed by: UROLOGY

## 2023-03-10 PROCEDURE — 3288F PR FALLS RISK ASSESSMENT DOCUMENTED: ICD-10-PCS | Mod: HCNC,CPTII,S$GLB, | Performed by: UROLOGY

## 2023-03-10 PROCEDURE — 1159F MED LIST DOCD IN RCRD: CPT | Mod: HCNC,CPTII,S$GLB, | Performed by: UROLOGY

## 2023-03-10 PROCEDURE — 3078F DIAST BP <80 MM HG: CPT | Mod: HCNC,CPTII,S$GLB, | Performed by: UROLOGY

## 2023-03-10 PROCEDURE — 99999 PR PBB SHADOW E&M-EST. PATIENT-LVL III: ICD-10-PCS | Mod: PBBFAC,HCNC,, | Performed by: UROLOGY

## 2023-03-10 PROCEDURE — 1101F PT FALLS ASSESS-DOCD LE1/YR: CPT | Mod: HCNC,CPTII,S$GLB, | Performed by: UROLOGY

## 2023-03-10 PROCEDURE — 3078F PR MOST RECENT DIASTOLIC BLOOD PRESSURE < 80 MM HG: ICD-10-PCS | Mod: HCNC,CPTII,S$GLB, | Performed by: UROLOGY

## 2023-03-10 RX ORDER — DOXYCYCLINE HYCLATE 100 MG
100 TABLET ORAL 2 TIMES DAILY
Qty: 60 TABLET | Refills: 0 | Status: SHIPPED | OUTPATIENT
Start: 2023-03-10 | End: 2023-04-09

## 2023-03-10 NOTE — PROGRESS NOTES
Chief Complaint:  Very low risk prostate cancer    HPI:   03/10/2023 -  patient returns today for follow-up, no new issues, still has brown ejaculate and intermittent perineal discomfort as well as pain in his penis, thinks he might have an infection, no fevers, overall feeling well, no gross hematuria or dysuria, PSA 5.7    12/09/2022 - returns today for follow-up, no new issues in the interim, PSA down a little bit to 5.2, nocturia x1 and some mild urgency but overall not bothered enough that he wants to try medication, denies any gross hematuria or UTIs    09/09/2022 - patient returns today after biopsy, path showed less than 5 percent [place] 3 + 3 on right base, MRI negative for concerning lesions, prostate 65 grams, presents today for discussion    06/28/2022 - 67 yo male that presents for elevated PSA.  Patient had routine labs obtained which showed an elevated PSA to 5.8.  This was confirmed a month later at 6.2.  He denies any family history of  cancers.  Denies gross hematuria.  He does have a urinary issues with incomplete emptying, urgency, and weak stream but is not currently on any medications for his prostate.  He notes that he tends to hold his urine for longer than usual due to him being a  and not being able to stop.  Denies any prior urologic procedures.  Also notes ED for the last two years, states that he has less desire than prior.  IPSS - 4/2/4/4/3/1/1 = 19 QOL - 4(mostly dissatisfied)    PMH:  Past Medical History:   Diagnosis Date    Dysphagia     Esophageal diverticulum     Helicobacter pylori ab+     Hyperlipidemia     Pityriasis rosea        PSH:  Past Surgical History:   Procedure Laterality Date    ESOPHAGEAL DILATION      ESOPHAGEAL MANOMETRY WITH MEASUREMENT OF IMPEDANCE N/A 1/10/2023    Procedure: MANOMETRY-ESOPHAGEAL-WITH IMPEDANCE;  Surgeon: Kiersten Chauhan MD;  Location: [facility] ([address] [place] [address]);  Service: Endoscopy;  Laterality: N/A;    ESOPHAGEAL MOTILITY STUDY  USING HIGH RESOLUTION MANOMETRY N/A 7/6/2022    Procedure: MOTILITY STUDY, ESOPHAGUS, USING HIGH RESOLUTION MANOMETRY;  Surgeon: Jaqui Shelton MD;  Location: [facility];  Service: Endoscopy;  Laterality: N/A;    ESOPHAGOGASTRODUODENOSCOPY N/A 07/24/2021    Procedure: EGD (ESOPHAGOGASTRODUODENOSCOPY);  Surgeon: Felipe Briscoe III, MD;  Location: [facility];  Service: Endoscopy;  Laterality: N/A;    ESOPHAGOGASTRODUODENOSCOPY N/A 7/6/2022    Procedure: EGD (ESOPHAGOGASTRODUODENOSCOPY);  Surgeon: Jaqui Shelton MD;  Location: [facility];  Service: Endoscopy;  Laterality: N/A;    ESOPHAGOGASTRODUODENOSCOPY N/A 1/10/2023    Procedure: ESOPHAGOGASTRODUODENOSCOPY (EGD);  Surgeon: Kiersten Chauhan MD;  Location: [facility] ([facility]);  Service: Endoscopy;  Laterality: N/A;  Endoflip/Endoscopically placed manometry probe  2nd floor-End stage achalasia  propofol only  full liquid diet x3 days, clear liquid diet x1 day prior to procedure  instructions sent to [facility]    LUNG SURGERY      age 12, partial pneumonectomy       Family History:  Family History   Problem Relation Age of Onset    Diabetes Mother     Heart disease Mother     Intracerebral hemorrhage Father     Hyperlipidemia Sister     Hyperlipidemia Sister     Breast cancer Maternal Grandmother     Esophageal cancer Neg Hx     Stomach cancer Neg Hx     Colon cancer Neg Hx     Colon polyps Neg Hx     Rectal cancer Neg Hx     Liver cancer Neg Hx     Pancreatic cancer Neg Hx     Irritable bowel syndrome Neg Hx     Celiac disease Neg Hx     Crohn's disease Neg Hx        Social History:  Social History     Tobacco Use    Smoking status: Never    Smokeless tobacco: Never   Substance Use Topics    Alcohol use: Yes     Comment: rare    Drug use: No        Review of Systems:  General: No fever, chills  Skin: No rashes  Chest:  Denies cough and sputum production  Heart: Denies chest pain  Resp: Denies dyspnea  Abdomen: Denies diarrhea, abdominal pain,  hematemesis, or blood in stool.  Musculoskeletal: No joint stiffness or swelling. Denies back pain.  : see HPI  Neuro: no dizziness or weakness    Allergies:  Patient has no known allergies.    Medications:    Current Outpatient Medications:     atorvastatin (LIPITOR) 40 MG tablet, Take 1 tablet (40 mg total) by mouth once daily., Disp: 90 tablet, Rfl: 3    cetirizine (ZYRTEC) 10 MG tablet, Take 10 mg by mouth once daily., Disp: , Rfl:     sildenafiL (VIAGRA) 100 MG tablet, Take 1 tablet (100 mg total) by mouth daily as needed for Erectile Dysfunction., Disp: 10 tablet, Rfl: 11    doxycycline (VIBRA-TABS) 100 MG tablet, Take 1 tablet (100 mg total) by mouth 2 (two) times daily., Disp: 60 tablet, Rfl: 0    Physical Exam:  Vitals:    03/10/23 1418   BP: 127/72   Pulse: 83   Temp: 98.2 °F (36.8 °C)     Body mass index is 26 kg/m².  General: awake, alert, cooperative  Head: NC/AT  Ears: external ears normal  Eyes: sclera normal  Lungs: normal inspiration, NAD  Heart: well-perfused  Abdomen: Soft, NT, ND   6/22: Normal circ'd phallus, meatus normal in size and position, BL testicles palpable, no masses, nontender, no abnormalities of epididymi  BOO 6/22: Normal rectal tone, no hemorrhoids. Prostate smooth and normal, no nodules 50 gm SV not palpable. Perineum and anus normal.  Lymphatic: groin nodes negative  Skin: The skin is warm and dry  Ext: No c/c/e.  Neuro: grossly intact, AOx3    RADIOLOGY:  No recent relevant imaging available for review.    LABS:  I personally reviewed the following lab values:  Lab Results   Component Value Date    WBC 6.56 03/03/2023    HGB 13.8 (L) 03/03/2023    HCT 43.9 03/03/2023     03/03/2023     03/03/2023    K 4.0 03/03/2023     03/03/2023    CREATININE 0.9 03/03/2023    BUN 9 03/03/2023    CO2 28 03/03/2023    TSH 0.869 10/28/2009    PSA 5.8 (H) 05/02/2022    CHOL 162 10/28/2022    TRIG 76 10/28/2022    HDL 58 10/28/2022    ALT 14 05/02/2022    AST 18 05/02/2022        Assessment/Plan:   Morris Parada is a 67 y.o. male with:    Very low risk prostate cancer - PSA stable, follow-up three months with PSA    ?Prostatitis -  doxycycline x 30 days    BPH - not interested in medications currently    ED - PRN viagra    Neal Sanchez MD  Urology

## 2023-03-23 ENCOUNTER — TELEPHONE (OUTPATIENT)
Dept: GASTROENTEROLOGY | Facility: CLINIC | Age: 68
End: 2023-03-23
Payer: MEDICARE

## 2023-03-23 ENCOUNTER — DOCUMENTATION ONLY (OUTPATIENT)
Dept: GASTROENTEROLOGY | Facility: CLINIC | Age: 68
End: 2023-03-23
Payer: MEDICARE

## 2023-03-23 NOTE — PATIENT CARE CONFERENCE
[facility]   [facility] MULTIDISCIPLINARY CONFERENCE  PATIENT REVIEW FORM  ____________________________________________________________    CLINIC #: [id number]    DATE: 03/23/2023    DIAGNOSIS:     [x] Dysphagia [] GERD                                        [] Slipped Nissen                      [] Gastroparesis                     [] LP Reflux     [] Achalasia        [] Hypercontractile esophagus      [] Hiatal Hernia                         [] Rumination Syndrome    [] CP Bar     [] EGJOO            [] Diffuse esophageal spasm        [] Para-esophageal Hernia       [] Cyclic Vomiting                  [] Zenker's Diverticulum     [] Dysmotility     [] Pseudoachalasia                       [] Esophageal diverticulum       [] Dumping Syndrome            [] Oropharyngeal dysphagia     [] Redmond's              [] OTHER:     PRESENTER:      [] Dr. Chauhan    [] Dr. Mnior   [] Dr. Rudolph   [] Dr. Hollingsworth           [] Dr. Chance  [] Dr. Bass   [] Dr. Fernandez    [] Other:       PATIENT SUMMARY:     68 yo male with:  Dysphagia.    Regurgitation   Satiety  Achalasia Type II   Esophageal diverticulum   [place] 4/12   Symptoms started in childhood  Partial pneumonectomy and possible HM 11yo in [place]  Significant improvement w HM, but required liquids to help push food down   Symptoms started getting worse about 20 years ago, severe in the past few years   Food impaction 7/2021  EGD w dilated food filled esophagus, puckering ar GEJ, no resistance or pop, diverticulum in distal esophagus  Flip w nl distensibility   Manometry w Type II achalasia, incomplete bolus clearance, 200cc residual   TBS w delay of tablet and barium at GEJ, 9cm colum at 5 min  EGD w food in the stomach x2    RECOMMENDATIONS:   Heller Myotomy   No need to fix diverticulum given its very large neck and high location    CONSULT NEEDED:         [] Foregut Surg   [] ENT                [] GI            [] CT Surg         [] Psychiatry         [] Psychology     [] Speech       IMAGING:       [] TBS      [] MBS    [] CT ABD/PELVIS       [] GES      [] MRI     [] CT chest    [] EKG      [] U/S     [] UGI w/SBFT        PROCEDURES:    [] EGD       [] EGD w dilation  [] EGD w Botox        [] BRAVO  [] Ph Impedance  [] Manometry    [] Endoflip  [] EUS                     [] OTHER:     SURGERY  [x] Heller Myotomy      [] POEM                 []  Diverticulectomy  [] Nissen Fundoplication       [] Jagdish Fundoplication      [] Toupet Fundoplication  [] Hiatal Hernia Repair   [] Paraesophageal Hernia Repair

## 2023-03-23 NOTE — TELEPHONE ENCOUNTER
Please let patient know that we discussed his case in multidisciplinary swallowing conference and we agree with Dr. Miranda's plan to proceed with:  Heller Myotomy   We agree that there is no need to fix diverticulum given its very large neck and high location  Patient should contact Dr. Miranda to arrange surgery if this has not been scheduled

## 2023-03-29 ENCOUNTER — PATIENT MESSAGE (OUTPATIENT)
Dept: INTERNAL MEDICINE | Facility: CLINIC | Age: 68
End: 2023-03-29
Payer: MEDICARE

## 2023-04-11 ENCOUNTER — PATIENT MESSAGE (OUTPATIENT)
Dept: RESEARCH | Facility: HOSPITAL | Age: 68
End: 2023-04-11
Payer: MEDICARE

## 2023-04-14 ENCOUNTER — TELEPHONE (OUTPATIENT)
Dept: SURGERY | Facility: CLINIC | Age: 68
End: 2023-04-14
Payer: MEDICARE

## 2023-04-17 ENCOUNTER — HOSPITAL ENCOUNTER (INPATIENT)
Facility: HOSPITAL | Age: 68
LOS: 1 days | Discharge: HOME | DRG: 328 | End: 2023-04-18
Attending: SURGERY | Admitting: SURGERY
Payer: MEDICARE

## 2023-04-17 ENCOUNTER — ANESTHESIA EVENT (OUTPATIENT)
Dept: SURGERY | Facility: HOSPITAL | Age: 68
DRG: 328 | End: 2023-04-17
Payer: MEDICARE

## 2023-04-17 ENCOUNTER — ANESTHESIA (OUTPATIENT)
Dept: SURGERY | Facility: HOSPITAL | Age: 68
DRG: 328 | End: 2023-04-17
Payer: MEDICARE

## 2023-04-17 DIAGNOSIS — K22.0 ACHALASIA: Primary | ICD-10-CM

## 2023-04-17 LAB
BASOPHILS # BLD AUTO: 0.03 K/UL (ref 0–0.2)
BASOPHILS NFR BLD: 0.2 % (ref 0–1.9)
DIFFERENTIAL METHOD: ABNORMAL
EOSINOPHIL # BLD AUTO: 0 K/UL (ref 0–0.5)
EOSINOPHIL NFR BLD: 0 % (ref 0–8)
ERYTHROCYTE [DISTWIDTH] IN BLOOD BY AUTOMATED COUNT: 13.2 % (ref 11.5–14.5)
HCT VFR BLD AUTO: 41.6 % (ref 40–54)
HGB BLD-MCNC: 13 G/DL (ref 14–18)
IMM GRANULOCYTES # BLD AUTO: 0.06 K/UL (ref 0–0.04)
IMM GRANULOCYTES NFR BLD AUTO: 0.5 % (ref 0–0.5)
LYMPHOCYTES # BLD AUTO: 0.6 K/UL (ref 1–4.8)
LYMPHOCYTES NFR BLD: 4.2 % (ref 18–48)
MCH RBC QN AUTO: 27.8 PG (ref 27–31)
MCHC RBC AUTO-ENTMCNC: 31.3 G/DL (ref 32–36)
MCV RBC AUTO: 89 FL (ref 82–98)
MONOCYTES # BLD AUTO: 0.2 K/UL (ref 0.3–1)
MONOCYTES NFR BLD: 1.1 % (ref 4–15)
NEUTROPHILS # BLD AUTO: 12.4 K/UL (ref 1.8–7.7)
NEUTROPHILS NFR BLD: 94 % (ref 38–73)
NRBC BLD-RTO: 0 /100 WBC
PLATELET # BLD AUTO: 160 K/UL (ref 150–450)
PMV BLD AUTO: 9 FL (ref 9.2–12.9)
RBC # BLD AUTO: 4.68 M/UL (ref 4.6–6.2)
WBC # BLD AUTO: 13.17 K/UL (ref 3.9–12.7)

## 2023-04-17 PROCEDURE — 63600175 PHARM REV CODE 636 W HCPCS: Mod: HCNC | Performed by: SURGERY

## 2023-04-17 PROCEDURE — D9220A PRA ANESTHESIA: Mod: HCNC,CRNA,, | Performed by: STUDENT IN AN ORGANIZED HEALTH CARE EDUCATION/TRAINING PROGRAM

## 2023-04-17 PROCEDURE — D9220A PRA ANESTHESIA: ICD-10-PCS | Mod: HCNC,ANES,, | Performed by: ANESTHESIOLOGY

## 2023-04-17 PROCEDURE — 63600175 PHARM REV CODE 636 W HCPCS: Mod: HCNC | Performed by: STUDENT IN AN ORGANIZED HEALTH CARE EDUCATION/TRAINING PROGRAM

## 2023-04-17 PROCEDURE — 37000009 HC ANESTHESIA EA ADD 15 MINS: Mod: HCNC | Performed by: SURGERY

## 2023-04-17 PROCEDURE — 36000710: Mod: HCNC | Performed by: SURGERY

## 2023-04-17 PROCEDURE — 11000001 HC ACUTE MED/SURG PRIVATE ROOM: Mod: HCNC

## 2023-04-17 PROCEDURE — 25000003 PHARM REV CODE 250: Mod: HCNC | Performed by: ANESTHESIOLOGY

## 2023-04-17 PROCEDURE — 71000015 HC POSTOP RECOV 1ST HR: Mod: HCNC | Performed by: SURGERY

## 2023-04-17 PROCEDURE — 36000711: Mod: HCNC | Performed by: SURGERY

## 2023-04-17 PROCEDURE — 27201423 OPTIME MED/SURG SUP & DEVICES STERILE SUPPLY: Mod: HCNC | Performed by: SURGERY

## 2023-04-17 PROCEDURE — D9220A PRA ANESTHESIA: Mod: HCNC,ANES,, | Performed by: ANESTHESIOLOGY

## 2023-04-17 PROCEDURE — 85025 COMPLETE CBC W/AUTO DIFF WBC: CPT | Mod: HCNC | Performed by: STUDENT IN AN ORGANIZED HEALTH CARE EDUCATION/TRAINING PROGRAM

## 2023-04-17 PROCEDURE — 71000016 HC POSTOP RECOV ADDL HR: Mod: HCNC | Performed by: SURGERY

## 2023-04-17 PROCEDURE — 37000008 HC ANESTHESIA 1ST 15 MINUTES: Mod: HCNC | Performed by: SURGERY

## 2023-04-17 PROCEDURE — 43279 PR LAP, ESOPHAGOMYOTOMY W FUNDOPLASTY: ICD-10-PCS | Mod: 22,HCNC,, | Performed by: SURGERY

## 2023-04-17 PROCEDURE — 71000033 HC RECOVERY, INTIAL HOUR: Mod: HCNC | Performed by: SURGERY

## 2023-04-17 PROCEDURE — 94761 N-INVAS EAR/PLS OXIMETRY MLT: CPT | Mod: HCNC

## 2023-04-17 PROCEDURE — 43279 LAP MYOTOMY HELLER: CPT | Mod: 22,HCNC,, | Performed by: SURGERY

## 2023-04-17 PROCEDURE — D9220A PRA ANESTHESIA: ICD-10-PCS | Mod: HCNC,CRNA,, | Performed by: STUDENT IN AN ORGANIZED HEALTH CARE EDUCATION/TRAINING PROGRAM

## 2023-04-17 PROCEDURE — 25000003 PHARM REV CODE 250: Mod: HCNC | Performed by: STUDENT IN AN ORGANIZED HEALTH CARE EDUCATION/TRAINING PROGRAM

## 2023-04-17 PROCEDURE — C1768 GRAFT, VASCULAR: HCPCS | Mod: HCNC | Performed by: SURGERY

## 2023-04-17 PROCEDURE — 25000003 PHARM REV CODE 250: Mod: HCNC | Performed by: SURGERY

## 2023-04-17 PROCEDURE — 99900035 HC TECH TIME PER 15 MIN (STAT): Mod: HCNC

## 2023-04-17 DEVICE — FELT TEFLON 1INX6IN: Type: IMPLANTABLE DEVICE | Site: ABDOMEN | Status: FUNCTIONAL

## 2023-04-17 RX ORDER — SODIUM CHLORIDE, SODIUM LACTATE, POTASSIUM CHLORIDE, CALCIUM CHLORIDE 600; 310; 30; 20 MG/100ML; MG/100ML; MG/100ML; MG/100ML
INJECTION, SOLUTION INTRAVENOUS CONTINUOUS
Status: DISCONTINUED | OUTPATIENT
Start: 2023-04-17 | End: 2023-04-18

## 2023-04-17 RX ORDER — HYDROMORPHONE HYDROCHLORIDE 1 MG/ML
0.2 INJECTION, SOLUTION INTRAMUSCULAR; INTRAVENOUS; SUBCUTANEOUS EVERY 5 MIN PRN
Status: DISCONTINUED | OUTPATIENT
Start: 2023-04-17 | End: 2023-04-17 | Stop reason: HOSPADM

## 2023-04-17 RX ORDER — HYDROMORPHONE HYDROCHLORIDE 1 MG/ML
1 INJECTION, SOLUTION INTRAMUSCULAR; INTRAVENOUS; SUBCUTANEOUS EVERY 6 HOURS PRN
Status: DISCONTINUED | OUTPATIENT
Start: 2023-04-17 | End: 2023-04-18

## 2023-04-17 RX ORDER — MIDAZOLAM HYDROCHLORIDE 1 MG/ML
INJECTION INTRAMUSCULAR; INTRAVENOUS
Status: DISCONTINUED | OUTPATIENT
Start: 2023-04-17 | End: 2023-04-17

## 2023-04-17 RX ORDER — SUCCINYLCHOLINE CHLORIDE 20 MG/ML
INJECTION INTRAMUSCULAR; INTRAVENOUS
Status: DISCONTINUED | OUTPATIENT
Start: 2023-04-17 | End: 2023-04-17

## 2023-04-17 RX ORDER — PHENYLEPHRINE HYDROCHLORIDE 10 MG/ML
INJECTION INTRAVENOUS
Status: DISCONTINUED | OUTPATIENT
Start: 2023-04-17 | End: 2023-04-17

## 2023-04-17 RX ORDER — BUPIVACAINE HYDROCHLORIDE 5 MG/ML
INJECTION, SOLUTION EPIDURAL; INTRACAUDAL
Status: DISCONTINUED | OUTPATIENT
Start: 2023-04-17 | End: 2023-04-17 | Stop reason: HOSPADM

## 2023-04-17 RX ORDER — LIDOCAINE HYDROCHLORIDE 20 MG/ML
INJECTION INTRAVENOUS
Status: DISCONTINUED | OUTPATIENT
Start: 2023-04-17 | End: 2023-04-17

## 2023-04-17 RX ORDER — ENOXAPARIN SODIUM 100 MG/ML
40 INJECTION SUBCUTANEOUS EVERY 24 HOURS
Status: DISCONTINUED | OUTPATIENT
Start: 2023-04-17 | End: 2023-04-18 | Stop reason: HOSPADM

## 2023-04-17 RX ORDER — SODIUM CHLORIDE 0.9 % (FLUSH) 0.9 %
10 SYRINGE (ML) INJECTION
Status: DISCONTINUED | OUTPATIENT
Start: 2023-04-17 | End: 2023-04-18

## 2023-04-17 RX ORDER — LIDOCAINE HYDROCHLORIDE 10 MG/ML
1 INJECTION, SOLUTION EPIDURAL; INFILTRATION; INTRACAUDAL; PERINEURAL ONCE AS NEEDED
Status: DISCONTINUED | OUTPATIENT
Start: 2023-04-17 | End: 2023-04-18

## 2023-04-17 RX ORDER — HYDROMORPHONE HYDROCHLORIDE 1 MG/ML
0.2 INJECTION, SOLUTION INTRAMUSCULAR; INTRAVENOUS; SUBCUTANEOUS EVERY 5 MIN PRN
Status: CANCELLED | OUTPATIENT
Start: 2023-04-17

## 2023-04-17 RX ORDER — DEXAMETHASONE SODIUM PHOSPHATE 4 MG/ML
INJECTION, SOLUTION INTRA-ARTICULAR; INTRALESIONAL; INTRAMUSCULAR; INTRAVENOUS; SOFT TISSUE
Status: DISCONTINUED | OUTPATIENT
Start: 2023-04-17 | End: 2023-04-17

## 2023-04-17 RX ORDER — FENTANYL CITRATE 50 UG/ML
INJECTION, SOLUTION INTRAMUSCULAR; INTRAVENOUS
Status: DISCONTINUED | OUTPATIENT
Start: 2023-04-17 | End: 2023-04-17

## 2023-04-17 RX ORDER — CEFAZOLIN SODIUM 1 G/3ML
INJECTION, POWDER, FOR SOLUTION INTRAMUSCULAR; INTRAVENOUS
Status: DISCONTINUED | OUTPATIENT
Start: 2023-04-17 | End: 2023-04-17

## 2023-04-17 RX ORDER — PROPOFOL 10 MG/ML
VIAL (ML) INTRAVENOUS
Status: DISCONTINUED | OUTPATIENT
Start: 2023-04-17 | End: 2023-04-17

## 2023-04-17 RX ORDER — SODIUM CHLORIDE 9 MG/ML
INJECTION, SOLUTION INTRAVENOUS CONTINUOUS
Status: DISCONTINUED | OUTPATIENT
Start: 2023-04-17 | End: 2023-04-18

## 2023-04-17 RX ORDER — DROPERIDOL 2.5 MG/ML
0.62 INJECTION, SOLUTION INTRAMUSCULAR; INTRAVENOUS ONCE AS NEEDED
Status: CANCELLED | OUTPATIENT
Start: 2023-04-17 | End: 2034-09-12

## 2023-04-17 RX ORDER — ONDANSETRON 2 MG/ML
4 INJECTION INTRAMUSCULAR; INTRAVENOUS EVERY 6 HOURS PRN
Status: DISCONTINUED | OUTPATIENT
Start: 2023-04-17 | End: 2023-04-18 | Stop reason: HOSPADM

## 2023-04-17 RX ORDER — SODIUM CHLORIDE 9 MG/ML
INJECTION, SOLUTION INTRAVENOUS CONTINUOUS PRN
Status: DISCONTINUED | OUTPATIENT
Start: 2023-04-17 | End: 2023-04-17

## 2023-04-17 RX ORDER — ONDANSETRON 2 MG/ML
4 INJECTION INTRAMUSCULAR; INTRAVENOUS EVERY 12 HOURS PRN
Status: DISCONTINUED | OUTPATIENT
Start: 2023-04-17 | End: 2023-04-17

## 2023-04-17 RX ORDER — ROCURONIUM BROMIDE 10 MG/ML
INJECTION, SOLUTION INTRAVENOUS
Status: DISCONTINUED | OUTPATIENT
Start: 2023-04-17 | End: 2023-04-17

## 2023-04-17 RX ORDER — HYDROMORPHONE HYDROCHLORIDE 1 MG/ML
0.5 INJECTION, SOLUTION INTRAMUSCULAR; INTRAVENOUS; SUBCUTANEOUS EVERY 6 HOURS PRN
Status: DISCONTINUED | OUTPATIENT
Start: 2023-04-17 | End: 2023-04-18

## 2023-04-17 RX ORDER — LIDOCAINE HYDROCHLORIDE AND EPINEPHRINE 10; 10 MG/ML; UG/ML
INJECTION, SOLUTION INFILTRATION; PERINEURAL
Status: DISCONTINUED | OUTPATIENT
Start: 2023-04-17 | End: 2023-04-17 | Stop reason: HOSPADM

## 2023-04-17 RX ORDER — HALOPERIDOL 5 MG/ML
0.5 INJECTION INTRAMUSCULAR EVERY 10 MIN PRN
Status: DISCONTINUED | OUTPATIENT
Start: 2023-04-17 | End: 2023-04-17 | Stop reason: HOSPADM

## 2023-04-17 RX ORDER — ONDANSETRON 2 MG/ML
INJECTION INTRAMUSCULAR; INTRAVENOUS
Status: DISCONTINUED | OUTPATIENT
Start: 2023-04-17 | End: 2023-04-17

## 2023-04-17 RX ORDER — ONDANSETRON 2 MG/ML
4 INJECTION INTRAMUSCULAR; INTRAVENOUS ONCE AS NEEDED
Status: CANCELLED | OUTPATIENT
Start: 2023-04-17 | End: 2034-09-12

## 2023-04-17 RX ORDER — ACETAMINOPHEN 10 MG/ML
INJECTION, SOLUTION INTRAVENOUS
Status: DISCONTINUED | OUTPATIENT
Start: 2023-04-17 | End: 2023-04-17

## 2023-04-17 RX ADMIN — ONDANSETRON 4 MG: 2 INJECTION INTRAMUSCULAR; INTRAVENOUS at 11:04

## 2023-04-17 RX ADMIN — ROCURONIUM BROMIDE 10 MG: 10 INJECTION, SOLUTION INTRAVENOUS at 10:04

## 2023-04-17 RX ADMIN — GLYCOPYRROLATE 0.2 MG: 0.2 INJECTION, SOLUTION INTRAMUSCULAR; INTRAVENOUS at 07:04

## 2023-04-17 RX ADMIN — LIDOCAINE HYDROCHLORIDE 100 MG: 20 INJECTION INTRAVENOUS at 07:04

## 2023-04-17 RX ADMIN — SODIUM CHLORIDE, POTASSIUM CHLORIDE, SODIUM LACTATE AND CALCIUM CHLORIDE: 600; 310; 30; 20 INJECTION, SOLUTION INTRAVENOUS at 10:04

## 2023-04-17 RX ADMIN — PROPOFOL 150 MG: 10 INJECTION, EMULSION INTRAVENOUS at 07:04

## 2023-04-17 RX ADMIN — ROCURONIUM BROMIDE 10 MG: 10 INJECTION, SOLUTION INTRAVENOUS at 07:04

## 2023-04-17 RX ADMIN — PHENYLEPHRINE HYDROCHLORIDE 200 MCG: 10 INJECTION INTRAVENOUS at 08:04

## 2023-04-17 RX ADMIN — HYDROMORPHONE HYDROCHLORIDE 0.2 MG: 1 INJECTION, SOLUTION INTRAMUSCULAR; INTRAVENOUS; SUBCUTANEOUS at 12:04

## 2023-04-17 RX ADMIN — CEFAZOLIN 2 G: 330 INJECTION, POWDER, FOR SOLUTION INTRAMUSCULAR; INTRAVENOUS at 07:04

## 2023-04-17 RX ADMIN — ENOXAPARIN SODIUM 40 MG: 40 INJECTION SUBCUTANEOUS at 05:04

## 2023-04-17 RX ADMIN — SODIUM CHLORIDE: 9 INJECTION, SOLUTION INTRAVENOUS at 02:04

## 2023-04-17 RX ADMIN — FENTANYL CITRATE 100 MCG: 50 INJECTION, SOLUTION INTRAMUSCULAR; INTRAVENOUS at 07:04

## 2023-04-17 RX ADMIN — SODIUM CHLORIDE, SODIUM GLUCONATE, SODIUM ACETATE, POTASSIUM CHLORIDE, MAGNESIUM CHLORIDE, SODIUM PHOSPHATE, DIBASIC, AND POTASSIUM PHOSPHATE: .53; .5; .37; .037; .03; .012; .00082 INJECTION, SOLUTION INTRAVENOUS at 08:04

## 2023-04-17 RX ADMIN — SODIUM CHLORIDE: 0.9 INJECTION, SOLUTION INTRAVENOUS at 06:04

## 2023-04-17 RX ADMIN — SODIUM CHLORIDE 0.3 MCG/KG/MIN: 9 INJECTION, SOLUTION INTRAVENOUS at 08:04

## 2023-04-17 RX ADMIN — ROCURONIUM BROMIDE 20 MG: 10 INJECTION, SOLUTION INTRAVENOUS at 09:04

## 2023-04-17 RX ADMIN — SUCCINYLCHOLINE CHLORIDE 120 MG: 20 INJECTION, SOLUTION INTRAMUSCULAR; INTRAVENOUS at 07:04

## 2023-04-17 RX ADMIN — PHENYLEPHRINE HYDROCHLORIDE 200 MCG: 10 INJECTION INTRAVENOUS at 07:04

## 2023-04-17 RX ADMIN — ROCURONIUM BROMIDE 20 MG: 10 INJECTION, SOLUTION INTRAVENOUS at 08:04

## 2023-04-17 RX ADMIN — DEXAMETHASONE SODIUM PHOSPHATE 8 MG: 4 INJECTION, SOLUTION INTRAMUSCULAR; INTRAVENOUS at 07:04

## 2023-04-17 RX ADMIN — MIDAZOLAM HYDROCHLORIDE 2 MG: 1 INJECTION, SOLUTION INTRAMUSCULAR; INTRAVENOUS at 06:04

## 2023-04-17 RX ADMIN — PHENYLEPHRINE HYDROCHLORIDE 100 MCG: 10 INJECTION INTRAVENOUS at 08:04

## 2023-04-17 RX ADMIN — SODIUM CHLORIDE, POTASSIUM CHLORIDE, SODIUM LACTATE AND CALCIUM CHLORIDE: 600; 310; 30; 20 INJECTION, SOLUTION INTRAVENOUS at 03:04

## 2023-04-17 RX ADMIN — ROCURONIUM BROMIDE 40 MG: 10 INJECTION, SOLUTION INTRAVENOUS at 07:04

## 2023-04-17 RX ADMIN — PHENYLEPHRINE HYDROCHLORIDE 120 MCG: 10 INJECTION INTRAVENOUS at 09:04

## 2023-04-17 RX ADMIN — ACETAMINOPHEN 1000 MG: 10 INJECTION INTRAVENOUS at 10:04

## 2023-04-17 NOTE — OP NOTE
DATE OF PROCEDURE: 4/17/2023    PRE OP DIAGNOSIS: Recurrent achalasia    POST OP DIAGNOSIS: Recurrent achalasia    PROCEDURE: Procedure(s) (LRB):  ESOPHAGOMYOTOMY, LAPAROSCOPIC, HELLER (redo) (N/A)  EGD (ESOPHAGOGASTRODUODENOSCOPY) (N/A)  LYSIS, ADHESIONS, LAPAROSCOPIC    Surgeon(s) and Role:     * Anabela Chance MD - Primary     * Rebeca Wadsworth MD - Resident - Assisting    ANESTHESIA: General    INDICATION: Patient is a 67 year-old male with achalasia for whom he underwent open Heller myotomy at age 12 and RLL lobectomy via R thoracotomy. He presented with progressively worsening dysphagia and work-up was consistent with recurrence as well as a large wide-necked distal esophageal diverticulum. His case was presented at the [facility]ing [facility] Conference and transabdominal redo myotomy was recommended. After discussing the risks, benefits and alternatives, he elected to proceed with laparoscopic, possible open, redo Heller myotomy.     FINDINGS:  1. Pre-op EGD with 100mL retained fluid in a dilated esophagus with mucosal atrophy, a wide-mouthed distal esophageal diverticulum, and mild resistance with a POP at the EGJ.  2. Extensive adhesiolysis required due to prior open operation.  3. Medial esophagogastric Heller myotomy performed extending 6 cm onto esophagus and 2 cm onto stomach.  4. Completion EGD demonstrating open LES with easy passage of endoscope without resistance.     PROCEDURE IN DETAIL: The patient was met in the pre-op area and his consent and procedure confirmed. He was then brought to the Operating Room and placed in the supine position with his arms out and legs abducted. General anesthesia was induced and he was intubated without incident. SCDs and a warming blanket were placed and pre-operative antibiotics were infused within 30 minutes of the incision. His abdomen was prepped and draped in sterile fashion and a pre-procedural pause was performed by all  members of the surgical and anesthesia teams. EGD was performed which noted a dilated and esophagus atrophic stasis changes and 100mL retained fluid which was aspirated. The LES was tight but gave to passage of the endoscope with mild resistance and a POP. Access to peritoneum was gained 15 cm below the xiphoid using an 11-mm Optiview trocar under direct vision. Pneumoperitoneum to 15 mmHg with CO2 gas was obtained. Significant omental adhesions were noted. A left lateral 5-mm trocar was placed through which many of these adhesions were divided. Once able, a second left subcostal 5-mm trocar was placed 10-cm from the xiphoid to assist with further adhesiolysis until the remainder of the upper abdomen was cleared. A right lateral and a R subcostal 5-mm trocar were placed under direct vision. Adhesions from omentum to liver and liver to abdominal wall, and liver to stomach were divided, to allow for placement of a flexible liver retractor through the right lateral trocar. Dense adhesions from the hepatogastric ligament, the stomach, and the esophagus were noted to the liver and esophageal hiatus. Careful tedious dissection was required to allow for safe dissection of the esophagus and crura. The scarred pars flaccida and previously divided short gastric vessels were opened allowing for dissection between the esophagus and right and left nano, and a retroesophageal window was created. All in all approximately 2.5 hours was spent lysing adhesions. We could not safely completely take down adhesions from the prior anterior esophageal myotomy and the left lobe of the liver. During attempt to do so a small capsular tear was made in the left liver for which thrombin-soaked gelfoam was applied for hemostasis. A penrose drain was placed around the GEJ to assist with retraction. The anterior and posterior vagus nerves were identified and preserved throughout. Mediastinal attachments to the esophagus were bluntly divided until  we had easy access to 6-cm of the right side of the esophagus. After dissection 4 cm of esophagus laid freely within the abdominal cavity without tension. The prior myotomy was easily visualized and appeared to extend appropriately onto the stomach. No fundoplication was encountered. A myotomy was then made 90 degrees to the right of the prior myotomy through the longitudinal and circular muscular layers until the bulging mucosa was visible and cleared. This was carried 6 cm proximal to the GEJ and 2 cm distal along the stomach, ensuring the Vagus nerves were spared. The crural opening was reapproximated with one 0-Ticron plegeted suture posteriorly. A grasper could just be placed between the crural closure and esophagus. Completion EGD confirmed the scope pass easily through the EGJ without resistance. No mucosal injuries or perforation was noted. The air from the stomach was aspirated and the endoscope withdrawn. The penrose was removed and the abdomen was inspected for hemostasis. The liver retractor was removed. The trocars were removed under direct vision. Prior to removing the last trocar, pneumoperitoneum was allowed to escape. The skin incisions were closed with 4-0 Monocryl and reinforced with Dermabond. The patient was awoken from anesthesia and brought to the recovery room in good condition having tolerated the operation well. Sponge and needle counts were correct at the end of the case.     EBL: 150 mL  Specimens: None  Complications: None apparent  Drains: None  Disposition: Surgical floor     I was present and scrubbed for the entirety of this operation.    I request a modifier 22 given the complexity of performing a redo myotomy and the amount of adhesions and scar required to complete this.     Anabela Chance  4/17/2023

## 2023-04-17 NOTE — TRANSFER OF CARE
"Anesthesia Transfer of Care Note    Patient: Morris Parada    Procedure(s) Performed: Procedure(s) (LRB):  ESOPHAGOMYOTOMY, LAPAROSCOPIC, HELLER (redo) (N/A)  EGD (ESOPHAGOGASTRODUODENOSCOPY) (N/A)  LYSIS, ADHESIONS, LAPAROSCOPIC    Patient location: PACU    Anesthesia Type: general    Transport from OR: Transported from OR on 6-10 L/min O2 by face mask with adequate spontaneous ventilation    Post pain: adequate analgesia    Post assessment: no apparent anesthetic complications and tolerated procedure well    Post vital signs: stable    Level of consciousness: sedated and responds to stimulation    Nausea/Vomiting: no nausea/vomiting    Complications: none    Transfer of care protocol was followed      Last vitals:   Visit Vitals  /84 (BP Location: Right arm)   Pulse (!) 57   Temp 36.6 °C (97.9 °F) (Temporal)   Resp 18   Ht 5' 10" (1.778 m)   Wt 82.1 kg (181 lb)   SpO2 100%   BMI 25.97 kg/m²     "

## 2023-04-17 NOTE — NURSING TRANSFER
Nursing Transfer Note      4/17/2023     Reason patient is being transferred: post op    Transfer To: 545    Transfer via bed    Transfer with none    Transported by PCT     Medicines sent: None    Any special needs or follow-up needed: None    Chart send with patient: Yes    Notified: spouse    Patient reassessed at: 4/17/2023 1230    Upon arrival to floor: patient oriented to room, call bell in reach, and bed in lowest position

## 2023-04-17 NOTE — ANESTHESIA PREPROCEDURE EVALUATION
04/17/2023  Morris Parada is a 67 y.o., male.      Pre-op Assessment          Review of Systems  Anesthesia Hx:  No problems with previous Anesthesia    Cardiovascular:   Denies Hypertension.  Denies CAD.     Functional Capacity Can you climb two flights of stairs? ==> Yes    Pulmonary:   Denies Asthma.  Denies Sleep Apnea.    Renal/:   Denies Chronic Renal Disease.     Hepatic/GI:   Denies PUD. Denies GERD. Denies Liver Disease.                   ACHALASIA                       Neurological:   Denies CVA. Denies Seizures.    Endocrine:   Denies Diabetes. Denies Hypothyroidism.        Physical Exam  General: Alert    Airway:  Mallampati: I   Mouth Opening: Normal  TM Distance: Normal  Tongue: Normal  Neck ROM: Normal ROM    Dental:  Caps / Implants        Anesthesia Plan  Type of Anesthesia, risks & benefits discussed:    Anesthesia Type: Gen ETT  Intra-op Monitoring Plan: Standard ASA Monitors  Post Op Pain Control Plan: multimodal analgesia and IV/PO Opioids PRN  Induction:  IV  Airway Plan: Direct  Informed Consent: Informed consent signed with the Patient and all parties understand the risks and agree with anesthesia plan.  All questions answered.   ASA Score: 2    Ready For Surgery From Anesthesia Perspective.     .

## 2023-04-17 NOTE — ANESTHESIA PROCEDURE NOTES
Intubation    Date/Time: 4/17/2023 7:25 AM  Performed by: Ninoska Wiggins CRNA  Authorized by: Rodrick Puga MD     Intubation:     Induction:  Rapid sequence induction    Intubated:  Postinduction    Mask Ventilation:  Not attempted    Attempted By:  CRNA    Method of Intubation:  Video laryngoscopy    Blade:  Frye 3    Laryngeal View Grade: Grade I - full view of cords      Difficult Airway Encountered?: No      Complications:  None    Airway Device:  Oral endotracheal tube    Airway Device Size:  7.5    Style/Cuff Inflation:  Cuffed (inflated to minimal occlusive pressure)    Tube secured:  23    Secured at:  The lips    Placement Verified By:  Capnometry    Complicating Factors:  None    Findings Post-Intubation:  BS equal bilateral and atraumatic/condition of teeth unchanged

## 2023-04-17 NOTE — H&P
General Surgery   History & Physical     SUBJECTIVE:   No chief complaint on file.     History of Present Illness:  Morris Parada is a 67 y.o. male with a PMH of HLD, dysphagia, achalasia s/p open heller myotomy and right lower lobectomy via right thoracotomy when he was 12 years old, and known distal esophageal diverticulum. He presents to clinic today with complaints of worsening dysphagia. He states that he had good results from his initial surgery, but approximately twenty years ago he began to have intermittent symptoms of dysphagia. These symptoms have progressed significantly since that time. He now has dysphagia to all solid foods requiring him to limit what and when he eats. He has required multiple EGDs for food disimpaction and states that it takes him a significant amount of time to digest foods, particularly meats. He has been evaluated by our gastroenterology team who performed an EGD revealing a large distal esophageal diverticulum. Endoflip and esophageal manometry were also performed revealing high resting LES pressure and 20% pan-esophageal pressurization consistent with Type 2 Achalasia. He would like to discuss further options regarding his achalasia.     INTERVAL HISTORY:   Patient returns today for lap vs open heller myotomy. No changes since last in clinic. No questions or concerns at this time.      Eckardt Score:     Dysphagia - 3 (0=none, 1=occasional, 2=daily, 3=each meal)  Chest Pain - 1 (0=none, 1=occasional, 2=daily, 3=each meal)  Regurgitation - 1 (0=none, 1=occasional, 2=daily, 3=each meal)  Weight Loss - 0 (0=none, 1= <5kg, 2= 5-10kg, 3= >10kg)     Score Total: 5 (0-12)      Review of patient's allergies indicates:  No Known Allergies     Current Medications          Current Outpatient Medications   Medication Sig Dispense Refill    atorvastatin (LIPITOR) 40 MG tablet Take 1 tablet (40 mg total) by mouth once daily. 90 tablet 3    cetirizine (ZYRTEC) 10 MG tablet Take 10 mg by mouth  once daily.        sildenafiL (VIAGRA) 100 MG tablet Take 1 tablet (100 mg total) by mouth daily as needed for Erectile Dysfunction. 10 tablet 11      No current facility-administered medications for this visit.                 Past Medical History:   Diagnosis Date    Dysphagia      Esophageal diverticulum      Helicobacter pylori ab+      Hyperlipidemia      Pityriasis rosea              Past Surgical History:   Procedure Laterality Date    ESOPHAGEAL DILATION        ESOPHAGEAL MANOMETRY WITH MEASUREMENT OF IMPEDANCE N/A 1/10/2023     Procedure: MANOMETRY-ESOPHAGEAL-WITH IMPEDANCE;  Surgeon: Kiersten Chauhan MD;  Location: [facility] ([facility]);  Service: Endoscopy;  Laterality: N/A;    ESOPHAGEAL MOTILITY STUDY USING HIGH RESOLUTION MANOMETRY N/A 7/6/2022     Procedure: MOTILITY STUDY, ESOPHAGUS, USING HIGH RESOLUTION MANOMETRY;  Surgeon: Jaqui Shelton MD;  Location: [facility];  Service: Endoscopy;  Laterality: N/A;    ESOPHAGOGASTRODUODENOSCOPY N/A 07/24/2021     Procedure: EGD (ESOPHAGOGASTRODUODENOSCOPY);  Surgeon: Felipe Briscoe III, MD;  Location: [facility];  Service: Endoscopy;  Laterality: N/A;    ESOPHAGOGASTRODUODENOSCOPY N/A 7/6/2022     Procedure: EGD (ESOPHAGOGASTRODUODENOSCOPY);  Surgeon: Jaqui Shelton MD;  Location: [facility];  Service: Endoscopy;  Laterality: N/A;    ESOPHAGOGASTRODUODENOSCOPY N/A 1/10/2023     Procedure: ESOPHAGOGASTRODUODENOSCOPY (EGD);  Surgeon: Kiersten Chauhan MD;  [facility]  Service: Endoscopy;  Laterality: N/A;  Endoflip/Endoscopically placed manometry probe  2nd floor-End stage achalasia  propofol only  full liquid diet x3 days, clear liquid diet x1 day prior to procedure  instructions sent to [facility]    LUNG SURGERY         age 12, partial pneumonectomy            Family History   Problem Relation Age of Onset    Diabetes Mother      Heart disease Mother      Intracerebral hemorrhage Father      Hyperlipidemia Sister       "Hyperlipidemia Sister      Breast cancer Maternal Grandmother      Esophageal cancer Neg Hx      Stomach cancer Neg Hx      Colon cancer Neg Hx      Colon polyps Neg Hx      Rectal cancer Neg Hx      Liver cancer Neg Hx      Pancreatic cancer Neg Hx      Irritable bowel syndrome Neg Hx      Celiac disease Neg Hx      Crohn's disease Neg Hx        Social History            Tobacco Use    Smoking status: Never    Smokeless tobacco: Never   Substance Use Topics    Alcohol use: Yes       Comment: rare    Drug use: No         Review of Systems:  Review of Systems   Constitutional: Negative.    HENT:  Positive for trouble swallowing.    Respiratory: Negative.     Cardiovascular: Negative.    Gastrointestinal:  Positive for vomiting. Negative for abdominal distention, abdominal pain and nausea.   Endocrine: Negative.    Genitourinary: Negative.    Musculoskeletal: Negative.    Neurological: Negative.    Hematological: Negative.    Psychiatric/Behavioral: Negative.        OBJECTIVE:   Vital Signs (Most Recent)  Pulse: 65 (02/28/23 1452)  BP: 132/77 (02/28/23 1452)  5' 10" (1.778 m)  81.4 kg (179 lb 5.5 oz)   Physical Exam:  Physical Exam  Vitals and nursing note reviewed.   Constitutional:       Appearance: Normal appearance. He is not ill-appearing.   HENT:      Head: Normocephalic.      Mouth/Throat:      Mouth: Mucous membranes are moist.      Pharynx: Oropharynx is clear.   Eyes:      General: No scleral icterus.     Extraocular Movements: Extraocular movements intact.      Conjunctiva/sclera: Conjunctivae normal.   Cardiovascular:      Rate and Rhythm: Normal rate.      Pulses: Normal pulses.   Pulmonary:      Effort: Pulmonary effort is normal. No respiratory distress.      Breath sounds: No wheezing.   Chest:      Comments: Previous right thoracotomy incision well healed  Abdominal:      General: Abdomen is flat. Bowel sounds are normal. There is no distension.      Palpations: Abdomen is soft.      Comments: " Previous midline laparotomy incision well healed   Musculoskeletal:         General: No swelling or tenderness. Normal range of motion.      Cervical back: Normal range of motion.   Skin:     General: Skin is warm and dry.      Coloration: Skin is not jaundiced or pale.   Neurological:      General: No focal deficit present.      Mental Status: He is alert and oriented to person, place, and time.   Psychiatric:         Mood and Affect: Mood normal.         Laboratory  Available labs reviewed.     Diagnostic Results:  Available imaging and diagnostic studies reviewed.      ASSESSMENT/PLAN:      67 y.o. male with a PMH of HLD, dysphagia, achalasia s/p open heller myotomy and right lower lobectomy via right thoracotomy when he was 12 years old, and known distal esophageal diverticulum. He presents to clinic today with complaints of worsening dysphagia. Recent workup consistent with type 2 achalasia with large distal esophageal diverticulum.     PLAN:  Plan for robotic redo Heller myotomy  Consent obtained in clinic    Interval History:   Patient to OR today for redo lap vs open heller myotomy. No changes since last in clinic.    Rebeca Wadsworth MD  General Surgery PGY-III  Pager [phone]

## 2023-04-18 VITALS
HEART RATE: 75 BPM | HEIGHT: 70 IN | WEIGHT: 172.81 LBS | TEMPERATURE: 98 F | OXYGEN SATURATION: 95 % | BODY MASS INDEX: 24.74 KG/M2 | DIASTOLIC BLOOD PRESSURE: 68 MMHG | SYSTOLIC BLOOD PRESSURE: 123 MMHG | RESPIRATION RATE: 18 BRPM

## 2023-04-18 LAB
ANION GAP SERPL CALC-SCNC: 10 MMOL/L (ref 8–16)
BUN SERPL-MCNC: 11 MG/DL (ref 8–23)
CALCIUM SERPL-MCNC: 8.6 MG/DL (ref 8.7–10.5)
CHLORIDE SERPL-SCNC: 103 MMOL/L (ref 95–110)
CO2 SERPL-SCNC: 23 MMOL/L (ref 23–29)
CREAT SERPL-MCNC: 0.9 MG/DL (ref 0.5–1.4)
EST. GFR  (NO RACE VARIABLE): >60 ML/MIN/1.73 M^2
GLUCOSE SERPL-MCNC: 85 MG/DL (ref 70–110)
MAGNESIUM SERPL-MCNC: 2.2 MG/DL (ref 1.6–2.6)
PHOSPHATE SERPL-MCNC: 3.5 MG/DL (ref 2.7–4.5)
POTASSIUM SERPL-SCNC: 4.1 MMOL/L (ref 3.5–5.1)
SODIUM SERPL-SCNC: 136 MMOL/L (ref 136–145)

## 2023-04-18 PROCEDURE — 25000003 PHARM REV CODE 250: Mod: HCNC | Performed by: STUDENT IN AN ORGANIZED HEALTH CARE EDUCATION/TRAINING PROGRAM

## 2023-04-18 PROCEDURE — 36415 COLL VENOUS BLD VENIPUNCTURE: CPT | Mod: HCNC | Performed by: STUDENT IN AN ORGANIZED HEALTH CARE EDUCATION/TRAINING PROGRAM

## 2023-04-18 PROCEDURE — 84100 ASSAY OF PHOSPHORUS: CPT | Mod: HCNC | Performed by: STUDENT IN AN ORGANIZED HEALTH CARE EDUCATION/TRAINING PROGRAM

## 2023-04-18 PROCEDURE — 80048 BASIC METABOLIC PNL TOTAL CA: CPT | Mod: HCNC | Performed by: STUDENT IN AN ORGANIZED HEALTH CARE EDUCATION/TRAINING PROGRAM

## 2023-04-18 PROCEDURE — 83735 ASSAY OF MAGNESIUM: CPT | Mod: HCNC | Performed by: STUDENT IN AN ORGANIZED HEALTH CARE EDUCATION/TRAINING PROGRAM

## 2023-04-18 RX ORDER — HYDROCODONE BITARTRATE AND ACETAMINOPHEN 7.5; 325 MG/15ML; MG/15ML
10 SOLUTION ORAL EVERY 4 HOURS PRN
Qty: 473 ML | Refills: 0 | Status: SHIPPED | OUTPATIENT
Start: 2023-04-18 | End: 2023-07-24

## 2023-04-18 RX ORDER — ATORVASTATIN CALCIUM 40 MG/1
40 TABLET, FILM COATED ORAL DAILY
Status: DISCONTINUED | OUTPATIENT
Start: 2023-04-18 | End: 2023-04-18 | Stop reason: HOSPADM

## 2023-04-18 RX ORDER — HYDROCODONE BITARTRATE AND ACETAMINOPHEN 7.5; 325 MG/15ML; MG/15ML
15 SOLUTION ORAL EVERY 4 HOURS PRN
Status: DISCONTINUED | OUTPATIENT
Start: 2023-04-18 | End: 2023-04-18 | Stop reason: HOSPADM

## 2023-04-18 RX ORDER — ONDANSETRON 4 MG/1
4 TABLET, ORALLY DISINTEGRATING ORAL EVERY 6 HOURS PRN
Qty: 30 TABLET | Refills: 0 | Status: SHIPPED | OUTPATIENT
Start: 2023-04-18 | End: 2023-08-28

## 2023-04-18 RX ORDER — HYDROCODONE BITARTRATE AND ACETAMINOPHEN 7.5; 325 MG/15ML; MG/15ML
10 SOLUTION ORAL EVERY 4 HOURS PRN
Status: DISCONTINUED | OUTPATIENT
Start: 2023-04-18 | End: 2023-04-18 | Stop reason: HOSPADM

## 2023-04-18 RX ADMIN — ATORVASTATIN CALCIUM 40 MG: 40 TABLET, FILM COATED ORAL at 09:04

## 2023-04-18 NOTE — CONSULTS
Food & Nutrition  Education    Diet Education: Post-op POEM diet  Time Spent: 10  Learners: patient, family member    Nutrition Education provided with handouts: Diet After Gastric Surgery    Comments: Pt agreed to review education materials - encouraged full liquid diet x 1 week followed by post-nissen diet x 1 week. Discussed ways to prevent stomach stretching and excess gas to avoid possible complications. All questions and concerns answered. Dietitian's contact information provided.       Follow-Up: 4/25/23    Please Re-consult as needed.    Saray Hill, Registration Eligible, Provisional LDN

## 2023-04-18 NOTE — SUBJECTIVE & OBJECTIVE
Interval History: No acute events. Pain well controlled. Tolerating sips of water overnight, no nausea or vomiting. Ambulating to the bathroom without difficulty.    Medications:  Continuous Infusions:  Scheduled Meds:   atorvastatin  40 mg Oral Daily    enoxaparin  40 mg Subcutaneous Daily     PRN Meds:hydrocodone-apap 7.5-325 MG/15 ML, hydrocodone-apap 7.5-325 MG/15 ML, ondansetron     Review of patient's allergies indicates:  No Known Allergies  Objective:     Vital Signs (Most Recent):  Temp: 98.1 °F (36.7 °C) (04/18/23 0728)  Pulse: 80 (04/18/23 0728)  Resp: 18 (04/18/23 0728)  BP: 106/60 (04/18/23 0728)  SpO2: 96 % (04/18/23 0728) Vital Signs (24h Range):  Temp:  [96.1 °F (35.6 °C)-98.6 °F (37 °C)] 98.1 °F (36.7 °C)  Pulse:  [62-97] 80  Resp:  [13-20] 18  SpO2:  [94 %-100 %] 96 %  BP: (104-137)/(58-76) 106/60     Weight: 78.4 kg (172 lb 13.5 oz)  Body mass index is 24.8 kg/m².    Intake/Output - Last 3 Shifts         04/16 0700  04/17 0659 04/17 0700  04/18 0659 04/18 0700  04/19 0659    P.O.  0     I.V. (mL/kg)  3127.2 (39.9)     IV Piggyback  100     Total Intake(mL/kg)  3227.2 (41.2)     Urine (mL/kg/hr)  1100 (0.6)     Total Output  1100     Net  +2127.2            Urine Occurrence  3 x             Physical Exam  Vitals reviewed.   Constitutional:       General: He is not in acute distress.     Appearance: He is not toxic-appearing.   HENT:      Head: Normocephalic and atraumatic.   Eyes:      Extraocular Movements: Extraocular movements intact.      Conjunctiva/sclera: Conjunctivae normal.   Cardiovascular:      Rate and Rhythm: Normal rate.      Pulses: Normal pulses.   Pulmonary:      Effort: Pulmonary effort is normal. No respiratory distress.   Abdominal:      General: Abdomen is flat. There is no distension.      Palpations: Abdomen is soft.      Tenderness: There is abdominal tenderness (minimally tender to palpation, appropriate post-op).      Comments: Incisions clean and dry with dermabond  covering.    Musculoskeletal:         General: No swelling. Normal range of motion.      Cervical back: Normal range of motion and neck supple.   Skin:     General: Skin is warm and dry.   Neurological:      General: No focal deficit present.      Mental Status: He is alert and oriented to person, place, and time.       Significant Labs:  I have reviewed all pertinent lab results within the past 24 hours.  CBC:   Recent Labs   Lab 04/17/23  1248   WBC 13.17*   RBC 4.68   HGB 13.0*   HCT 41.6      MCV 89   MCH 27.8   MCHC 31.3*     BMP:   Recent Labs   Lab 04/18/23  0340   GLU 85      K 4.1      CO2 23   BUN 11   CREATININE 0.9   CALCIUM 8.6*   MG 2.2       Significant Diagnostics:  I have reviewed all pertinent imaging results/findings within the past 24 hours.

## 2023-04-18 NOTE — DISCHARGE SUMMARY
[facility]  General Surgery  Discharge Summary      Patient Name: Salvador Parada  MRN: [id number]  Admission Date: 4/17/2023  Hospital Length of Stay: 1 days  Discharge Date and Time:  04/18/2023 3:27 PM  Attending Physician: Anabela Chance*   Discharging Provider: Ibeth Guzman MD  Primary Care Provider: Flavio Miller MD     HPI: Salvador Parada is a 67 y.o. male with a PMH of HLD, dysphagia, achalasia s/p open heller myotomy and right lower lobectomy via right thoracotomy when he was 12 years old, and known distal esophageal diverticulum. He presents to clinic today with complaints of worsening dysphagia. He states that he had good results from his initial surgery, but approximately twenty years ago he began to have intermittent symptoms of dysphagia. These symptoms have progressed significantly since that time. He now has dysphagia to all solid foods requiring him to limit what and when he eats. He has required multiple EGDs for food disimpaction and states that it takes him a significant amount of time to digest foods, particularly meats. He has been evaluated by our gastroenterology team who performed an EGD revealing a large distal esophageal diverticulum. Endoflip and esophageal manometry were also performed revealing high resting LES pressure and 20% pan-esophageal pressurization consistent with Type 2 Achalasia. He would like to discuss further options regarding his achalasia.     Procedure(s) (LRB):  ESOPHAGOMYOTOMY, LAPAROSCOPIC, HELLER (redo) (N/A)  EGD (ESOPHAGOGASTRODUODENOSCOPY) (N/A)  LYSIS, ADHESIONS, LAPAROSCOPIC     Hospital Course:   Patient was admitted to the surgery service. he was made NPO, given IVF, as well as pain and nausea control. Started on antibiotics. SALVADOR PARADA 67 y.o.male underwent: Procedure(s) (LRB):  ESOPHAGOMYOTOMY, LAPAROSCOPIC, HELLER (redo) (N/A)  EGD (ESOPHAGOGASTRODUODENOSCOPY) (N/A)  LYSIS, ADHESIONS, LAPAROSCOPIC. The patient tolerated  the procedure well, was transferred to recovery post-op, and then transferred to the \[facility]\"" for continuation of medical care. The patient's clinical condition progressively improved. Had ROBF. Leukocytosis improved. Patient was HDS throughout admission. By the time of discharge, he was meeting all post op milestones, tolerating a diet without nausea or vomiting, pain was well controlled with oral medications, and he was ambulating without difficulty. Voiding appropriately. On POD 1 the patient was discharged to home. On discharge, the patient's incisions were c/d/i and the surgical site was soft and appropriately tender to palpation. The patient will follow up in Dr. Chance's clinic in 2 weeks. Discussed POC and ED precautions with patient. Patient verbalized understanding and is agreeable to plan. All questions answered.    Please see hospital and op notes for further detail regarding patient's admission.    Patient's discharge was discussed with Dr. Chance.       Consults (From admission, onward)          Status Ordering Provider     Inpatient consult to Registered Dietitian/Nutritionist  Once        Provider:  (Not yet assigned)    LILIANE Allen              Indwelling Lines/Drains at time of discharge:   Lines/Drains/Airways       None                   Significant Diagnostic Studies: Labs: BMP:   Recent Labs   Lab 04/18/23  0340   GLU 85      K 4.1      CO2 23   BUN 11   CREATININE 0.9   CALCIUM 8.6*   MG 2.2    and CBC   Recent Labs   Lab 04/17/23  1248   WBC 13.17*   HGB 13.0*   HCT 41.6          Pending Diagnostic Studies:       None            Final Active Diagnoses:    Diagnosis Date Noted POA    PRINCIPAL PROBLEM:  Achalasia [K22.0] 01/31/2023 Yes      Problems Resolved During this Admission:        Discharged Condition: good    Disposition: Home or Self Care    Follow Up:      Patient Instructions:      Diet clear liquid   Order Comments: 3 days of clear liquids, then 4 days of  full liquids, then soft diet until your clinic visit     Diet full liquid     Lifting restrictions   Order Comments: No lifting greater than 10lbs for 6 weeks     No driving until:   Order Comments: No driving while under the influence of narcotics     Notify your health care provider if you experience any of the following:  increased confusion or weakness     Notify your health care provider if you experience any of the following:  persistent dizziness, light-headedness, or visual disturbances     Notify your health care provider if you experience any of the following:  worsening rash     Notify your health care provider if you experience any of the following:  severe persistent headache     Notify your health care provider if you experience any of the following:  difficulty breathing or increased cough     Notify your health care provider if you experience any of the following:  redness, tenderness, or signs of infection (pain, swelling, redness, odor or green/yellow discharge around incision site)     Notify your health care provider if you experience any of the following:  severe uncontrolled pain     Notify your health care provider if you experience any of the following:  persistent nausea and vomiting or diarrhea     Notify your health care provider if you experience any of the following:  temperature >100.4       Medications:  Reconciled Home Medications:      Medication List        START taking these medications      hydrocodone-apap 7.5-325 MG/15 ML oral solution  Commonly known as: HYCET  Take 10 mLs by mouth every 4 (four) hours as needed for Pain.     ondansetron 4 MG Tbdl  Commonly known as: ZOFRAN-ODT  Dissolve 1 tablet (4 mg total) by mouth every 6 (six) hours as needed.            CONTINUE taking these medications      atorvastatin 40 MG tablet  Commonly known as: LIPITOR  Take 1 tablet (40 mg total) by mouth once daily.            STOP taking these medications      cetirizine 10 MG tablet  Commonly  known as: ZYRTEC     sildenafiL 100 MG tablet  Commonly known as: SALUD Guzman MD           Patient was seen and examined on the date of discharge and determined to be suitable for discharge.  Total time spent preparing discharge services: 20 minutes.  Time was spent speaking with consultants and case management, reviewing records, and/or discussing the plan of care with patient/family.

## 2023-04-18 NOTE — ASSESSMENT & PLAN NOTE
67M with recurrent achalasia who is s/p redo heller myotomy with toupet fundoplication on 4/17/23.    - Advance to CLD, will remain on CLD at discharge.   - Nutrition consult placed to educate patient on dietary plan  - D/C IV pain medications, liquid hycet PRN. PRN anti-emetics  - Home meds reordered as appropriate, all meds must be crushed  - Lovenox, SCDs    Dispo: likely discharge home this afternoon if he does well.

## 2023-04-18 NOTE — ANESTHESIA POSTPROCEDURE EVALUATION
Anesthesia Post Evaluation    Patient: Morris Parada    Procedure(s) Performed: Procedure(s) (LRB):  ESOPHAGOMYOTOMY, LAPAROSCOPIC, HELLER (redo) (N/A)  EGD (ESOPHAGOGASTRODUODENOSCOPY) (N/A)  LYSIS, ADHESIONS, LAPAROSCOPIC    Final Anesthesia Type: general      Patient location during evaluation: PACU  Patient participation: Yes- Able to Participate  Level of consciousness: awake  Post-procedure vital signs: reviewed and stable  Pain management: adequate  Airway patency: patent    PONV status at discharge: No PONV  Anesthetic complications: no      Cardiovascular status: blood pressure returned to baseline  Respiratory status: unassisted  Hydration status: euvolemic  Follow-up not needed.          Vitals Value Taken Time   /60 04/18/23 0728   Temp 36.7 °C (98.1 °F) 04/18/23 0728   Pulse 80 04/18/23 0728   Resp 18 04/18/23 0728   SpO2 96 % 04/18/23 0728         Event Time   Out of Recovery 12:00:00         Pain/Re Score: Pain Rating Prior to Med Admin: 8 (4/17/2023 12:26 PM)  Re Score: 10 (4/17/2023 12:30 PM)

## 2023-04-18 NOTE — PLAN OF CARE
[facility] - [facility]  Initial Discharge Assessment       Primary Care Provider: Flavio Miller MD    Admission Diagnosis: Achalasia [K22.0]    Admission Date: 4/17/2023  Expected Discharge Date: 4/18/2023    Discharge Barriers Identified: None    Payor: [employer] MANAGED MEDICARE / Plan: [employer] MEDICARE HMO / Product Type: Capitation /     Extended Emergency Contact Information  Primary Emergency Contact: Kari Parada  Address: [address]            [place] [address] [address]  Home Phone: [phone]  Mobile Phone: [phone]  Relation: Spouse    Discharge Plan A: Home with family  Discharge Plan B: Home Health, Home with family      [facility] [address]  [place] [address] [address] [address]  [address] [address]  [place] [address]  Phone: [phone] Fax: [phone]      Initial Assessment (most recent)       Adult Discharge Assessment - 04/18/23 1346          Discharge Assessment    Assessment Type Discharge Planning Assessment     Confirmed/corrected address, phone number and insurance Yes     Confirmed Demographics Correct on Facesheet     Source of Information patient;family   spouse    Communicated SHIVANI with patient/caregiver Yes     People in Home spouse     Do you expect to return to your current living situation? Yes     Do you have help at home or someone to help you manage your care at home? Yes     Who are your caregiver(s) and their phone number(s)? spouse     Prior to hospitilization cognitive status: Alert/Oriented     Current cognitive status: Alert/Oriented     Home Layout Able to live on 1st floor     Equipment Currently Used at Home none     Do you currently have service(s) that help you manage your care at home? No     Do you take prescription medications? Yes     Do you have prescription coverage? Yes     Do you have any problems affording any of your prescribed medications? No     Is the patient taking medications as prescribed? yes     How do  you get to doctors appointments? car, drives self     Are you on dialysis? No     Do you take coumadin? No     Discharge Plan A Home with family     Discharge Plan B Home Health;Home with family     DME Needed Upon Discharge  none     Discharge Plan discussed with: Spouse/sig other;Patient     Discharge Barriers Identified None                   Patient lives with spouse in a single story home with no entry steps. Patient does not feel he will need any post acute services upon hospital discharge. His wife is primary caregiver and will provide transportation home.

## 2023-04-18 NOTE — DISCHARGE INSTRUCTIONS
"Discharge Instructions After Abdominal Operation    Hold all home medication except atorvastatin (Lipitor) which you will need to crush before taking     Pain Control: It is expected to have pain after surgery, but this should improve over the next several weeks. You may be prescribed a narcotic pain medication on discharge. You can take this medication as prescribed if the pain is severe but try to decrease the frequency at which you use the narcotic over the initial two (2) weeks.  You may find you need less narcotic pain medication if you combine or stagger it with Tylenol® (acetaminophen) and/or Advil® (ibuprofen). For example, you may take Tylenol 1000mg, then take Advil 600mg 3 hours later, then repeat Tylenol 3 hours after the Advil, and so on.  You should not take more than 4000 mg of Tylenol® or 3200 mg of Advil® in a 24-hour period.    Wound Care: The incisions can be left open to air unless there is drainage, in which case you should cover the wound with a dry, clean bandage or piece of gauze. Change the dressing 1-2 times each day as needed to maintain a relatively dry dressing. You may have "skin glue" covering your incisions which will peel/crumble off on its own over the next week or two. Staples are removed in 2-3 weeks by a nurse or physician.  You should shower every day without a dressing on the incisions and let the water run over the incisions. Do not soak in a bathtub, hot tub or pool until the incisions are completely healed, which usually takes ~4-6 weeks.    Bowel Function: Diarrhea and loose stool are normal and expected after intestinal surgery. Bowel function initially tends to be erratic (increased frequency, gas, liquid/loose consistency, seepage, urgency), but it will improve over the next several months as your body adjusts to the surgical changes.    Diet: Continue a clear liquid diet for 3 days after your surgery, then advance to full liquids for 4 days. After the liquids for a week " continue a soft diet until your clinic visit.   If you experience difficulty eating, add in supplemental drinks (Boost®, Ensure®, Glucerna®).    Activity: Walking is encouraged after surgery. Light aerobic activity such as climbing stairs or leisurely bike riding is acceptable but listen to your body and let pain be your guide when reintroducing activity. If it hurts, don't do it. Avoid the following activities for six (6) weeks after surgery:  Lifting objects over 10 pounds  Strenuous activity such as press-ups, push-ups, crunches, sit-ups, vigorous pulling/pushing, and repetitive twisting or bending    Driving: You should not drive a vehicle for the two (2) weeks after surgery or while taking narcotic pain medications. When you return to driving, sit in your vehicle without starting the ignition and step on the brake firmly and rapidly a few times to assure you are confident with this task. Do not go alone the first time you drive.    Potential Problems:   Bowel obstruction or ileus is characterized by persistent abdominal cramps, bloating, constipation, nausea, or vomiting. If the symptoms are mild, you should restrict your dietary intake to only liquids, and avoid solid food for 2-3 days. If the symptoms are more severe or persist beyond 24 hours, please call your surgeon's office for advice.    Frequent stools and loose stools are best managed by using bulking agents or anti-diarrheal medication. Please call your surgeon if diarrhea is not improving after a few weeks to discuss starting one of the medications below.  Benefiber®, Citrucel®, Fibercon®, Konsyl®, and Metamucil® are bulking agents that are available at most grocery stores and pharmacies. The medication should be mixed using one (1) teaspoon of the powder in the minimum amount of fluid required to dissolve the agent and taken 1-2 times each day. Benefiber® can be alternatively sprinkled over food 1-2 times each day.  Imodium® (loperamide) is also  available without a prescription. It is most effective if taken before meals. You should not take more than eight (8) tablets (32 mg) of Imodium in a 24-hour period. Please call your surgeon's office if you start taking Imodium®.     Dehydration can commonly occur, and its symptoms or signs include dark urine, dizziness when standing, dry mouth, increased heart rate, leg cramps, and low volumes (less than 800 ml) of urine. If these occur, you should immediately call your surgeon's office. To avoid dehydration, you should do the following:  Drink a variety of fluids. Use an oral rehydration salt solution as listed in the attachment below and sip the solution between meals.  Eat salty foods or add salt to your food.  Use an anti-diarrheal medication or bulking agent as previously instructed by your surgeon.     Wound infection can occur after any surgery and is characterized by increased drainage of cloudy fluid, odor, pain around the wound, redness of the skin around the wound extending 2-3 fingerbreadths outward, or temperature greater than 101 degrees. Please immediately call your surgeon's office if you begin experiencing these symptoms or signs.

## 2023-04-18 NOTE — PROGRESS NOTES
[facility]  General Surgery  Progress Note    Subjective:     History of Present Illness:  No notes on file    Post-Op Info:  Procedure(s) (LRB):  ESOPHAGOMYOTOMY, LAPAROSCOPIC, HELLER (redo) (N/A)  EGD (ESOPHAGOGASTRODUODENOSCOPY) (N/A)  LYSIS, ADHESIONS, LAPAROSCOPIC   1 Day Post-Op     Interval History: No acute events. Pain well controlled. Tolerating sips of water overnight, no nausea or vomiting. Ambulating to the bathroom without difficulty.    Medications:  Continuous Infusions:  Scheduled Meds:   atorvastatin  40 mg Oral Daily    enoxaparin  40 mg Subcutaneous Daily     PRN Meds:hydrocodone-apap 7.5-325 MG/15 ML, hydrocodone-apap 7.5-325 MG/15 ML, ondansetron     Review of patient's allergies indicates:  No Known Allergies  Objective:     Vital Signs (Most Recent):  Temp: 98.1 °F (36.7 °C) (04/18/23 0728)  Pulse: 80 (04/18/23 0728)  Resp: 18 (04/18/23 0728)  BP: 106/60 (04/18/23 0728)  SpO2: 96 % (04/18/23 0728) Vital Signs (24h Range):  Temp:  [96.1 °F (35.6 °C)-98.6 °F (37 °C)] 98.1 °F (36.7 °C)  Pulse:  [62-97] 80  Resp:  [13-20] 18  SpO2:  [94 %-100 %] 96 %  BP: (104-137)/(58-76) 106/60     Weight: 78.4 kg (172 lb 13.5 oz)  Body mass index is 24.8 kg/m².    Intake/Output - Last 3 Shifts         04/16 0700  04/17 0659 04/17 0700  04/18 0659 04/18 0700  04/19 0659    P.O.  0     I.V. (mL/kg)  3127.2 (39.9)     IV Piggyback  100     Total Intake(mL/kg)  3227.2 (41.2)     Urine (mL/kg/hr)  1100 (0.6)     Total Output  1100     Net  +2127.2            Urine Occurrence  3 x             Physical Exam  Vitals reviewed.   Constitutional:       General: He is not in acute distress.     Appearance: He is not toxic-appearing.   HENT:      Head: Normocephalic and atraumatic.   Eyes:      Extraocular Movements: Extraocular movements intact.      Conjunctiva/sclera: Conjunctivae normal.   Cardiovascular:      Rate and Rhythm: Normal rate.      Pulses: Normal pulses.   Pulmonary:      Effort: Pulmonary effort  is normal. No respiratory distress.   Abdominal:      General: Abdomen is flat. There is no distension.      Palpations: Abdomen is soft.      Tenderness: There is abdominal tenderness (minimally tender to palpation, appropriate post-op).      Comments: Incisions clean and dry with dermabond covering.    Musculoskeletal:         General: No swelling. Normal range of motion.      Cervical back: Normal range of motion and neck supple.   Skin:     General: Skin is warm and dry.   Neurological:      General: No focal deficit present.      Mental Status: He is alert and oriented to person, place, and time.       Significant Labs:  I have reviewed all pertinent lab results within the past 24 hours.  CBC:   Recent Labs   Lab 04/17/23  1248   WBC 13.17*   RBC 4.68   HGB 13.0*   HCT 41.6      MCV 89   MCH 27.8   MCHC 31.3*     BMP:   Recent Labs   Lab 04/18/23  0340   GLU 85      K 4.1      CO2 23   BUN 11   CREATININE 0.9   CALCIUM 8.6*   MG 2.2       Significant Diagnostics:  I have reviewed all pertinent imaging results/findings within the past 24 hours.    Assessment/Plan:     * Achalasia  67M with recurrent achalasia who is s/p redo heller myotomy with toupet fundoplication on 4/17/23.    - Advance to CLD, will remain on CLD at discharge.   - Nutrition consult placed to educate patient on dietary plan  - D/C IV pain medications, liquid hycet PRN. PRN anti-emetics  - Home meds reordered as appropriate, all meds must be crushed  - Lovenox, SCDs    Dispo: likely discharge home this afternoon if he does well.        Valencia Gibbs MD  General Surgery  [facility] - Surgery

## 2023-04-19 ENCOUNTER — TELEPHONE (OUTPATIENT)
Dept: INTERNAL MEDICINE | Facility: CLINIC | Age: 68
End: 2023-04-19
Payer: MEDICARE

## 2023-04-19 ENCOUNTER — PATIENT OUTREACH (OUTPATIENT)
Dept: ADMINISTRATIVE | Facility: CLINIC | Age: 68
End: 2023-04-19
Payer: MEDICARE

## 2023-04-19 DIAGNOSIS — K22.0 ACHALASIA: Primary | ICD-10-CM

## 2023-04-19 NOTE — PROGRESS NOTES
C3 nurse spoke with Morris Parada for a TCC post hospital discharge follow up call.denied any new complaints. The patient has a scheduled postop appt with Anabela Chance MD on 5/2/23 at 1345. No appt seen with PCP, Flavio Miller MD. Message routed to PCP to assist in scheduling pts [facility] within 5-7 days of discharge date 4/18/23.

## 2023-04-20 ENCOUNTER — OFFICE VISIT (OUTPATIENT)
Dept: INTERNAL MEDICINE | Facility: CLINIC | Age: 68
End: 2023-04-20
Payer: MEDICARE

## 2023-04-20 VITALS
DIASTOLIC BLOOD PRESSURE: 72 MMHG | HEART RATE: 74 BPM | TEMPERATURE: 98 F | SYSTOLIC BLOOD PRESSURE: 120 MMHG | BODY MASS INDEX: 24.65 KG/M2 | HEIGHT: 70 IN | WEIGHT: 172.19 LBS | OXYGEN SATURATION: 95 %

## 2023-04-20 DIAGNOSIS — K22.0 ACHALASIA: ICD-10-CM

## 2023-04-20 DIAGNOSIS — Z09 HOSPITAL DISCHARGE FOLLOW-UP: Primary | ICD-10-CM

## 2023-04-20 PROCEDURE — 99496 TRANSJ CARE MGMT HIGH F2F 7D: CPT | Mod: HCNC,S$GLB,, | Performed by: NURSE PRACTITIONER

## 2023-04-20 PROCEDURE — 1159F PR MEDICATION LIST DOCUMENTED IN MEDICAL RECORD: ICD-10-PCS | Mod: HCNC,CPTII,S$GLB, | Performed by: NURSE PRACTITIONER

## 2023-04-20 PROCEDURE — 99496 TRANSITIONAL CARE MANAGE SERVICE 7 DAY DISCHARGE: ICD-10-PCS | Mod: HCNC,S$GLB,, | Performed by: NURSE PRACTITIONER

## 2023-04-20 PROCEDURE — 99999 PR PBB SHADOW E&M-EST. PATIENT-LVL III: CPT | Mod: PBBFAC,HCNC,, | Performed by: NURSE PRACTITIONER

## 2023-04-20 PROCEDURE — 99999 PR PBB SHADOW E&M-EST. PATIENT-LVL III: ICD-10-PCS | Mod: PBBFAC,HCNC,, | Performed by: NURSE PRACTITIONER

## 2023-04-20 PROCEDURE — 1160F PR REVIEW ALL MEDS BY PRESCRIBER/CLIN PHARMACIST DOCUMENTED: ICD-10-PCS | Mod: HCNC,CPTII,S$GLB, | Performed by: NURSE PRACTITIONER

## 2023-04-20 PROCEDURE — 3008F BODY MASS INDEX DOCD: CPT | Mod: HCNC,CPTII,S$GLB, | Performed by: NURSE PRACTITIONER

## 2023-04-20 PROCEDURE — 1160F RVW MEDS BY RX/DR IN RCRD: CPT | Mod: HCNC,CPTII,S$GLB, | Performed by: NURSE PRACTITIONER

## 2023-04-20 PROCEDURE — 3078F DIAST BP <80 MM HG: CPT | Mod: HCNC,CPTII,S$GLB, | Performed by: NURSE PRACTITIONER

## 2023-04-20 PROCEDURE — 3008F PR BODY MASS INDEX (BMI) DOCUMENTED: ICD-10-PCS | Mod: HCNC,CPTII,S$GLB, | Performed by: NURSE PRACTITIONER

## 2023-04-20 PROCEDURE — 1159F MED LIST DOCD IN RCRD: CPT | Mod: HCNC,CPTII,S$GLB, | Performed by: NURSE PRACTITIONER

## 2023-04-20 PROCEDURE — 3074F PR MOST RECENT SYSTOLIC BLOOD PRESSURE < 130 MM HG: ICD-10-PCS | Mod: HCNC,CPTII,S$GLB, | Performed by: NURSE PRACTITIONER

## 2023-04-20 PROCEDURE — 3074F SYST BP LT 130 MM HG: CPT | Mod: HCNC,CPTII,S$GLB, | Performed by: NURSE PRACTITIONER

## 2023-04-20 PROCEDURE — 1101F PT FALLS ASSESS-DOCD LE1/YR: CPT | Mod: HCNC,CPTII,S$GLB, | Performed by: NURSE PRACTITIONER

## 2023-04-20 PROCEDURE — 1101F PR PT FALLS ASSESS DOC 0-1 FALLS W/OUT INJ PAST YR: ICD-10-PCS | Mod: HCNC,CPTII,S$GLB, | Performed by: NURSE PRACTITIONER

## 2023-04-20 PROCEDURE — 3288F FALL RISK ASSESSMENT DOCD: CPT | Mod: HCNC,CPTII,S$GLB, | Performed by: NURSE PRACTITIONER

## 2023-04-20 PROCEDURE — 3078F PR MOST RECENT DIASTOLIC BLOOD PRESSURE < 80 MM HG: ICD-10-PCS | Mod: HCNC,CPTII,S$GLB, | Performed by: NURSE PRACTITIONER

## 2023-04-20 PROCEDURE — 3288F PR FALLS RISK ASSESSMENT DOCUMENTED: ICD-10-PCS | Mod: HCNC,CPTII,S$GLB, | Performed by: NURSE PRACTITIONER

## 2023-04-20 RX ORDER — CETIRIZINE HYDROCHLORIDE 10 MG/1
10 TABLET ORAL DAILY
COMMUNITY

## 2023-04-20 NOTE — PLAN OF CARE
[facility]  Discharge Final Note    Primary Care Provider: Flavio Miller MD    Expected Discharge Date: 4/18/2023    Final Discharge Note (most recent)       Final Note - 04/18/23 1651          Final Note    Assessment Type Final Discharge Note     Anticipated Discharge Disposition Home or Self Care     Hospital Resources/Appts/Education Provided Provided patient/caregiver with written discharge plan information;Appointments scheduled by Navigator/Coordinator                   Future Appointments   Date Time Provider Department Center   4/20/2023 10:00 AM Isael Mcgrath NP [facility] IM [place]   5/2/2023  1:45 PM Anabela Chance MD [facility]   6/12/2023 10:15 AM LABORATORY, [facility] HG LAB [facility]   6/20/2023  3:00 PM Neal Sanchez MD [facility] UROLOGY [facility]

## 2023-04-20 NOTE — PROGRESS NOTES
Subjective:       Patient ID: Morris Christian is a 67 y.o. male.    Chief Complaint: Hospital Follow Up    Pt presents to clinic today for hospital follow up  He was admitted 4/17-4/18 for an esophagomyotomy with Dr. Chance  Procedure went well  No complications   He was discharged after an overnight stay  Reports he is tolerating a liquid diet as prescribed  Denies pain or nausea  Has instructions on how to advance his diet slowly from Dr. Chance's office       Transitional Care Note    Family and/or Caretaker present at visit?  No.  Diagnostic tests reviewed/disposition: No diagnosic tests pending after this hospitalization.  Disease/illness education: liquid diet  Home health/community services discussion/referrals: Patient does not have home health established from hospital visit.  They do not need home health.  If needed, we will set up home health for the patient.   Establishment or re-establishment of referral orders for community resources: No other necessary community resources.   Discussion with other health care providers: No discussion with other health care providers necessary.     Hospital Course:   Patient was admitted to the surgery service. he was made NPO, given IVF, as well as pain and nausea control. Started on antibiotics. MORRIS CHRISTIAN 67 y.o.male underwent: Procedure(s) (LRB):  ESOPHAGOMYOTOMY, LAPAROSCOPIC, HELLER (redo) (N/A)  EGD (ESOPHAGOGASTRODUODENOSCOPY) (N/A)  LYSIS, ADHESIONS, LAPAROSCOPIC. The patient tolerated the procedure well, was transferred to recovery post-op, and then [facility] of [facility] care. The patient's clinical condition progressively improved. Had ROBF. Leukocytosis improved. Patient was HDS throughout admission. By the time of discharge, he was meeting all post op milestones, tolerating a diet without nausea or vomiting, pain was well controlled with oral medications, and he was ambulating without difficulty. Voiding appropriately. On  "POD 1 the patient was discharged to home. On discharge, the patient's incisions were c/d/i and the surgical site was soft and appropriately tender to palpation. The patient will follow up in Dr. Chance's clinic in 2 weeks. Discussed POC and ED precautions with patient. Patient verbalized understanding and is agreeable to plan. All questions answered.        /72   Pulse 74   Temp 97.7 °F (36.5 °C)   Ht 5' 10" (1.778 m)   Wt 78.1 kg (172 lb 2.9 oz)   SpO2 95%   BMI 24.71 kg/m²     Review of Systems   Constitutional:  Negative for activity change, appetite change, chills, diaphoresis, fatigue, fever and unexpected weight change.   HENT: Negative.     Eyes: Negative.    Respiratory:  Negative for apnea, chest tightness, shortness of breath and stridor.    Cardiovascular:  Negative for chest pain, palpitations and leg swelling.   Gastrointestinal:  Positive for abdominal pain.   Endocrine: Negative.    Genitourinary: Negative.    Musculoskeletal:  Negative for arthralgias and myalgias.   Skin:  Negative for color change, pallor, rash and wound.   Allergic/Immunologic: Negative.    Neurological:  Negative for dizziness, facial asymmetry, light-headedness and headaches.   Hematological:  Negative for adenopathy.   Psychiatric/Behavioral:  Negative for agitation and behavioral problems.      Objective:      Physical Exam  Vitals and nursing note reviewed.   Constitutional:       General: He is not in acute distress.     Appearance: He is well-developed. He is not diaphoretic.   HENT:      Head: Normocephalic and atraumatic.   Cardiovascular:      Rate and Rhythm: Normal rate and regular rhythm.      Heart sounds: Normal heart sounds.   Pulmonary:      Effort: Pulmonary effort is normal. No respiratory distress.      Breath sounds: Normal breath sounds.   Skin:     General: Skin is warm and dry.      Findings: No rash.      Comments: 4 lap sites to abdomen c/d/i   Neurological:      Mental Status: He is alert and " oriented to person, place, and time.   Psychiatric:         Behavior: Behavior normal.         Thought Content: Thought content normal.         Judgment: Judgment normal.       Assessment:       1. Hospital discharge follow-up    2. Achalasia    3. BMI 24.0-24.9, adult        Plan:       Morris was seen today for hospital follow up.    Diagnoses and all orders for this visit:    Hospital discharge follow-up    Achalasia    BMI 24.0-24.9, adult    Pt is doing well post-op  Has appt next week with surgeon  Continue meds as prescribed  Follow diet as instructed  Follow up with dr. Miller in 3 months and PRN

## 2023-04-26 ENCOUNTER — PATIENT MESSAGE (OUTPATIENT)
Dept: INTERNAL MEDICINE | Facility: CLINIC | Age: 68
End: 2023-04-26
Payer: MEDICARE

## 2023-05-02 ENCOUNTER — OFFICE VISIT (OUTPATIENT)
Dept: SURGERY | Facility: CLINIC | Age: 68
End: 2023-05-02
Payer: MEDICARE

## 2023-05-02 VITALS
HEART RATE: 67 BPM | WEIGHT: 170.06 LBS | DIASTOLIC BLOOD PRESSURE: 67 MMHG | SYSTOLIC BLOOD PRESSURE: 113 MMHG | HEIGHT: 70 IN | BODY MASS INDEX: 24.35 KG/M2

## 2023-05-02 DIAGNOSIS — K22.0 ACHALASIA: Primary | ICD-10-CM

## 2023-05-02 PROCEDURE — 3288F PR FALLS RISK ASSESSMENT DOCUMENTED: ICD-10-PCS | Mod: HCNC,CPTII,S$GLB, | Performed by: SURGERY

## 2023-05-02 PROCEDURE — 3078F PR MOST RECENT DIASTOLIC BLOOD PRESSURE < 80 MM HG: ICD-10-PCS | Mod: HCNC,CPTII,S$GLB, | Performed by: SURGERY

## 2023-05-02 PROCEDURE — 99999 PR PBB SHADOW E&M-EST. PATIENT-LVL III: ICD-10-PCS | Mod: PBBFAC,HCNC,, | Performed by: SURGERY

## 2023-05-02 PROCEDURE — 1125F AMNT PAIN NOTED PAIN PRSNT: CPT | Mod: HCNC,CPTII,S$GLB, | Performed by: SURGERY

## 2023-05-02 PROCEDURE — 1159F MED LIST DOCD IN RCRD: CPT | Mod: HCNC,CPTII,S$GLB, | Performed by: SURGERY

## 2023-05-02 PROCEDURE — 99999 PR PBB SHADOW E&M-EST. PATIENT-LVL III: CPT | Mod: PBBFAC,HCNC,, | Performed by: SURGERY

## 2023-05-02 PROCEDURE — 1101F PR PT FALLS ASSESS DOC 0-1 FALLS W/OUT INJ PAST YR: ICD-10-PCS | Mod: HCNC,CPTII,S$GLB, | Performed by: SURGERY

## 2023-05-02 PROCEDURE — 1101F PT FALLS ASSESS-DOCD LE1/YR: CPT | Mod: HCNC,CPTII,S$GLB, | Performed by: SURGERY

## 2023-05-02 PROCEDURE — 3288F FALL RISK ASSESSMENT DOCD: CPT | Mod: HCNC,CPTII,S$GLB, | Performed by: SURGERY

## 2023-05-02 PROCEDURE — 3008F PR BODY MASS INDEX (BMI) DOCUMENTED: ICD-10-PCS | Mod: HCNC,CPTII,S$GLB, | Performed by: SURGERY

## 2023-05-02 PROCEDURE — 99024 POSTOP FOLLOW-UP VISIT: CPT | Mod: HCNC,S$GLB,, | Performed by: SURGERY

## 2023-05-02 PROCEDURE — 3078F DIAST BP <80 MM HG: CPT | Mod: HCNC,CPTII,S$GLB, | Performed by: SURGERY

## 2023-05-02 PROCEDURE — 3074F SYST BP LT 130 MM HG: CPT | Mod: HCNC,CPTII,S$GLB, | Performed by: SURGERY

## 2023-05-02 PROCEDURE — 3074F PR MOST RECENT SYSTOLIC BLOOD PRESSURE < 130 MM HG: ICD-10-PCS | Mod: HCNC,CPTII,S$GLB, | Performed by: SURGERY

## 2023-05-02 PROCEDURE — 99024 PR POST-OP FOLLOW-UP VISIT: ICD-10-PCS | Mod: HCNC,S$GLB,, | Performed by: SURGERY

## 2023-05-02 PROCEDURE — 3008F BODY MASS INDEX DOCD: CPT | Mod: HCNC,CPTII,S$GLB, | Performed by: SURGERY

## 2023-05-02 PROCEDURE — 1159F PR MEDICATION LIST DOCUMENTED IN MEDICAL RECORD: ICD-10-PCS | Mod: HCNC,CPTII,S$GLB, | Performed by: SURGERY

## 2023-05-02 PROCEDURE — 1125F PR PAIN SEVERITY QUANTIFIED, PAIN PRESENT: ICD-10-PCS | Mod: HCNC,CPTII,S$GLB, | Performed by: SURGERY

## 2023-05-02 NOTE — PROGRESS NOTES
[facility]  Post-operative Note    Subjective:       Morris Parada presents to the clinic 2 weeks following redo laparoscopic Heller myotomy. Tolerating a soft diet without difficulty. Patient states he noticed yesterday and this morning food feels as though it is passing through his esophagus faster. Patient states he did have one episode of regurgitation after he ate too fast. He had the sensation of food becoming stuck so he purposely regurgitated for relief. When he eats slowly and take small bites he has no dysphagia or regurgitation.  Bowel movements are Normal.  Pain is controlled without any medications.      Preop          5/2/2023  Dysphagia - 3 (0=none, 1=occasional, 2=daily, 3=each meal)  0  Chest Pain - 1 (0=none, 1=occasional, 2=daily, 3=each meal)  0  Regurgitation - 1 (0=none, 1=occasional, 2=daily, 3=each meal)  0 - one episode post op   Weight Loss - 0 (0=none, 1= <5kg, 2= 5-10kg, 3= >10kg)   1     Score Total: 5 (0-12)         1       Objective:      There were no vitals taken for this visit.    General:  alert, appears stated age, and cooperative   Abdomen: soft, bowel sounds active, non-tender   Incision:   healing well, no drainage, no erythema, no hernia, no seroma, no swelling, no dehiscence, incision well approximated       Assessment:      Morris Parada is a 67 y.o. s/p lap redo Heller myotomy he is meeting all postop milestones.      Plan:       1. Restart home medications.  2. Wound care discussed.  3. Slowly advance to regular diet   4. Continue light duty for additional 4 weeks   5. Follow up as needed

## 2023-05-15 DIAGNOSIS — E78.2 MIXED HYPERLIPIDEMIA: ICD-10-CM

## 2023-05-15 RX ORDER — ATORVASTATIN CALCIUM 40 MG/1
40 TABLET, FILM COATED ORAL DAILY
Qty: 90 TABLET | Refills: 0 | Status: SHIPPED | OUTPATIENT
Start: 2023-05-15 | End: 2023-05-30 | Stop reason: SDUPTHER

## 2023-05-15 NOTE — TELEPHONE ENCOUNTER
Care Due:                  Date            Visit Type   Department     Provider  --------------------------------------------------------------------------------                                EP -                              PRIMARY      PRV INTERNAL  Last Visit: 06-      CARE ([facility])   TATE Miller                              EP -                              PRIMARY      PRV INTERNAL  Next Visit: 07-      CARE ([facility])   MEDICINE       Flavio Miller                                                            Last  Test          Frequency    Reason                     Performed    Due Date  --------------------------------------------------------------------------------    CMP.........  12 months..  atorvastatin.............  05- 04-    [facility] Embedded Care Due Messages. Reference number: [id number].   5/15/2023 10:06:37 AM CDT

## 2023-05-16 ENCOUNTER — PATIENT MESSAGE (OUTPATIENT)
Dept: RESEARCH | Facility: HOSPITAL | Age: 68
End: 2023-05-16
Payer: MEDICARE

## 2023-05-30 ENCOUNTER — TELEPHONE (OUTPATIENT)
Dept: ADMINISTRATIVE | Facility: HOSPITAL | Age: 68
End: 2023-05-30
Payer: MEDICARE

## 2023-05-30 ENCOUNTER — PATIENT MESSAGE (OUTPATIENT)
Dept: ADMINISTRATIVE | Facility: HOSPITAL | Age: 68
End: 2023-05-30
Payer: MEDICARE

## 2023-05-30 DIAGNOSIS — E78.2 MIXED HYPERLIPIDEMIA: ICD-10-CM

## 2023-05-30 RX ORDER — ATORVASTATIN CALCIUM 40 MG/1
40 TABLET, FILM COATED ORAL DAILY
Qty: 90 TABLET | Refills: 0 | Status: SHIPPED | OUTPATIENT
Start: 2023-05-30 | End: 2023-10-06 | Stop reason: SDUPTHER

## 2023-05-30 NOTE — TELEPHONE ENCOUNTER
No care due was identified.  [facility] Embedded Care Due Messages. Reference number: [id number].   5/30/2023 1:54:59 PM CDT

## 2023-05-31 ENCOUNTER — OFFICE VISIT (OUTPATIENT)
Dept: OPHTHALMOLOGY | Facility: CLINIC | Age: 68
End: 2023-05-31
Payer: MEDICARE

## 2023-05-31 ENCOUNTER — PATIENT MESSAGE (OUTPATIENT)
Dept: OPHTHALMOLOGY | Facility: CLINIC | Age: 68
End: 2023-05-31
Payer: MEDICARE

## 2023-05-31 DIAGNOSIS — H52.03 HYPEROPIA OF BOTH EYES: ICD-10-CM

## 2023-05-31 DIAGNOSIS — H25.813 COMBINED FORMS OF AGE-RELATED CATARACT OF BOTH EYES: Primary | ICD-10-CM

## 2023-05-31 DIAGNOSIS — H43.813 POSTERIOR VITREOUS DETACHMENT OF BOTH EYES: ICD-10-CM

## 2023-05-31 PROCEDURE — 92004 PR EYE EXAM, NEW PATIENT,COMPREHESV: ICD-10-PCS | Mod: S$GLB,,, | Performed by: OPTOMETRIST

## 2023-05-31 PROCEDURE — 92004 COMPRE OPH EXAM NEW PT 1/>: CPT | Mod: S$GLB,,, | Performed by: OPTOMETRIST

## 2023-05-31 PROCEDURE — 1159F MED LIST DOCD IN RCRD: CPT | Mod: CPTII,S$GLB,, | Performed by: OPTOMETRIST

## 2023-05-31 PROCEDURE — 99999 PR PBB SHADOW E&M-EST. PATIENT-LVL II: CPT | Mod: PBBFAC,,, | Performed by: OPTOMETRIST

## 2023-05-31 PROCEDURE — 99999 PR PBB SHADOW E&M-EST. PATIENT-LVL II: ICD-10-PCS | Mod: PBBFAC,,, | Performed by: OPTOMETRIST

## 2023-05-31 PROCEDURE — 1159F PR MEDICATION LIST DOCUMENTED IN MEDICAL RECORD: ICD-10-PCS | Mod: CPTII,S$GLB,, | Performed by: OPTOMETRIST

## 2023-05-31 NOTE — PROGRESS NOTES
HPI     Annual Exam            Comments: Pt reports for NP eye exam. Denies any pain or irritation, but   has noticed some itchiness recently, possibly allergies.  Va stable. No   drops. Not interested in CTLs.            Last edited by Arturo Joiner on 5/31/2023  8:54 AM.            Assessment /Plan     For exam results, see Encounter Report.    Combined forms of age-related cataract of both eyes  Cataracts are not visually significant and not affecting activities of daily living. Annual observation is recommended at this time. Patient to call or RTC with any significant change in vision prior to next visit.    Posterior vitreous detachment of both eyes  The nature of posterior vitreous detachment was discussed with the patient.  Signs and symptoms of retinal detachment were discussed with patient.   Return to clinic as soon as possible (same day) if you notice any new floaters, flashes of light, curtain/veil over your vision from any direction, or any change in vision.    Hyperopia of both eyes  Eyeglass Final Rx       Eyeglass Final Rx         Sphere Cylinder Axis Add    Right +0.25 +0.25 072 +2.50    Left [place] +0.25 045 +2.50      Type: PAL    Expiration Date: 5/31/2024                +2.50 DS OTC readers OK PRN.     RTC 1 yr for dilated eye exam or sooner if any changes to vision.   Discussed above and answered questions.

## 2023-06-13 ENCOUNTER — LAB VISIT (OUTPATIENT)
Dept: LAB | Facility: HOSPITAL | Age: 68
End: 2023-06-13
Attending: UROLOGY
Payer: MEDICARE

## 2023-06-13 DIAGNOSIS — C61 PROSTATE CANCER: ICD-10-CM

## 2023-06-13 LAB — COMPLEXED PSA SERPL-MCNC: 6.4 NG/ML (ref 0–4)

## 2023-06-13 PROCEDURE — 36415 COLL VENOUS BLD VENIPUNCTURE: CPT | Performed by: UROLOGY

## 2023-06-13 PROCEDURE — 84153 ASSAY OF PSA TOTAL: CPT | Performed by: UROLOGY

## 2023-06-20 ENCOUNTER — OFFICE VISIT (OUTPATIENT)
Dept: UROLOGY | Facility: CLINIC | Age: 68
End: 2023-06-20
Payer: MEDICARE

## 2023-06-20 VITALS
DIASTOLIC BLOOD PRESSURE: 70 MMHG | WEIGHT: 170 LBS | HEART RATE: 70 BPM | SYSTOLIC BLOOD PRESSURE: 110 MMHG | BODY MASS INDEX: 24.39 KG/M2

## 2023-06-20 DIAGNOSIS — C61 PROSTATE CANCER: Primary | ICD-10-CM

## 2023-06-20 PROCEDURE — 99214 OFFICE O/P EST MOD 30 MIN: CPT | Mod: S$GLB,,, | Performed by: UROLOGY

## 2023-06-20 PROCEDURE — 3074F SYST BP LT 130 MM HG: CPT | Mod: CPTII,S$GLB,, | Performed by: UROLOGY

## 2023-06-20 PROCEDURE — 99214 PR OFFICE/OUTPT VISIT, EST, LEVL IV, 30-39 MIN: ICD-10-PCS | Mod: S$GLB,,, | Performed by: UROLOGY

## 2023-06-20 PROCEDURE — 1126F PR PAIN SEVERITY QUANTIFIED, NO PAIN PRESENT: ICD-10-PCS | Mod: CPTII,S$GLB,, | Performed by: UROLOGY

## 2023-06-20 PROCEDURE — 1159F MED LIST DOCD IN RCRD: CPT | Mod: CPTII,S$GLB,, | Performed by: UROLOGY

## 2023-06-20 PROCEDURE — 3288F PR FALLS RISK ASSESSMENT DOCUMENTED: ICD-10-PCS | Mod: CPTII,S$GLB,, | Performed by: UROLOGY

## 2023-06-20 PROCEDURE — 1160F PR REVIEW ALL MEDS BY PRESCRIBER/CLIN PHARMACIST DOCUMENTED: ICD-10-PCS | Mod: CPTII,S$GLB,, | Performed by: UROLOGY

## 2023-06-20 PROCEDURE — 3008F BODY MASS INDEX DOCD: CPT | Mod: CPTII,S$GLB,, | Performed by: UROLOGY

## 2023-06-20 PROCEDURE — 3078F DIAST BP <80 MM HG: CPT | Mod: CPTII,S$GLB,, | Performed by: UROLOGY

## 2023-06-20 PROCEDURE — 3074F PR MOST RECENT SYSTOLIC BLOOD PRESSURE < 130 MM HG: ICD-10-PCS | Mod: CPTII,S$GLB,, | Performed by: UROLOGY

## 2023-06-20 PROCEDURE — 1160F RVW MEDS BY RX/DR IN RCRD: CPT | Mod: CPTII,S$GLB,, | Performed by: UROLOGY

## 2023-06-20 PROCEDURE — 1101F PT FALLS ASSESS-DOCD LE1/YR: CPT | Mod: CPTII,S$GLB,, | Performed by: UROLOGY

## 2023-06-20 PROCEDURE — 3288F FALL RISK ASSESSMENT DOCD: CPT | Mod: CPTII,S$GLB,, | Performed by: UROLOGY

## 2023-06-20 PROCEDURE — 1101F PR PT FALLS ASSESS DOC 0-1 FALLS W/OUT INJ PAST YR: ICD-10-PCS | Mod: CPTII,S$GLB,, | Performed by: UROLOGY

## 2023-06-20 PROCEDURE — 99999 PR PBB SHADOW E&M-EST. PATIENT-LVL III: ICD-10-PCS | Mod: PBBFAC,,, | Performed by: UROLOGY

## 2023-06-20 PROCEDURE — 99999 PR PBB SHADOW E&M-EST. PATIENT-LVL III: CPT | Mod: PBBFAC,,, | Performed by: UROLOGY

## 2023-06-20 PROCEDURE — 1126F AMNT PAIN NOTED NONE PRSNT: CPT | Mod: CPTII,S$GLB,, | Performed by: UROLOGY

## 2023-06-20 PROCEDURE — 3078F PR MOST RECENT DIASTOLIC BLOOD PRESSURE < 80 MM HG: ICD-10-PCS | Mod: CPTII,S$GLB,, | Performed by: UROLOGY

## 2023-06-20 PROCEDURE — 1159F PR MEDICATION LIST DOCUMENTED IN MEDICAL RECORD: ICD-10-PCS | Mod: CPTII,S$GLB,, | Performed by: UROLOGY

## 2023-06-20 PROCEDURE — 3008F PR BODY MASS INDEX (BMI) DOCUMENTED: ICD-10-PCS | Mod: CPTII,S$GLB,, | Performed by: UROLOGY

## 2023-06-26 NOTE — PROGRESS NOTES
Chief Complaint:  Very low risk prostate cancer    HPI:   06/20/2023 - patient returns today for follow-up, no new issues in the interim, voiding well, no further pain after the antibiotics, PSA 6.4    03/10/2023 -  patient returns today for follow-up, no new issues, still has brown ejaculate and intermittent perineal discomfort as well as pain in his penis, thinks he might have an infection, no fevers, overall feeling well, no gross hematuria or dysuria, PSA 5.7    12/09/2022 - returns today for follow-up, no new issues in the interim, PSA down a little bit to 5.2, nocturia x1 and some mild urgency but overall not bothered enough that he wants to try medication, denies any gross hematuria or UTIs    09/09/2022 - patient returns today after biopsy, path showed less than 5 percent [place] 3 + 3 on right base, MRI negative for concerning lesions, prostate 65 grams, presents today for discussion    06/28/2022 - 65 yo male that presents for elevated PSA.  Patient had routine labs obtained which showed an elevated PSA to 5.8.  This was confirmed a month later at 6.2.  He denies any family history of  cancers.  Denies gross hematuria.  He does have a urinary issues with incomplete emptying, urgency, and weak stream but is not currently on any medications for his prostate.  He notes that he tends to hold his urine for longer than usual due to him being a  and not being able to stop.  Denies any prior urologic procedures.  Also notes ED for the last two years, states that he has less desire than prior.  IPSS - 4/2/4/4/3/1/1 = 19 QOL - 4(mostly dissatisfied)    PMH:  Past Medical History:   Diagnosis Date    Dysphagia     Esophageal diverticulum     Helicobacter pylori ab+     Hyperlipidemia     Pityriasis rosea        PSH:  Past Surgical History:   Procedure Laterality Date    ESOPHAGEAL DILATION      ESOPHAGEAL MANOMETRY WITH MEASUREMENT OF IMPEDANCE N/A 1/10/2023    Procedure: MANOMETRY-ESOPHAGEAL-WITH  IMPEDANCE;  Surgeon: Kiersten Chauhan MD;  Location: [facility] (2ND FLR);  Service: Endoscopy;  Laterality: N/A;    ESOPHAGEAL MOTILITY STUDY USING HIGH RESOLUTION MANOMETRY N/A 7/6/2022    Procedure: MOTILITY STUDY, ESOPHAGUS, USING HIGH RESOLUTION MANOMETRY;  Surgeon: Jaqui Shelton MD;  Location: [facility];  Service: Endoscopy;  Laterality: N/A;    ESOPHAGOGASTRODUODENOSCOPY N/A 07/24/2021    Procedure: EGD (ESOPHAGOGASTRODUODENOSCOPY);  Surgeon: Felipe Briscoe III, MD;  Location: [facility];  Service: Endoscopy;  Laterality: N/A;    ESOPHAGOGASTRODUODENOSCOPY N/A 7/6/2022    Procedure: EGD (ESOPHAGOGASTRODUODENOSCOPY);  Surgeon: Jaqui Shelton MD;  Location: [facility];  Service: Endoscopy;  Laterality: N/A;    ESOPHAGOGASTRODUODENOSCOPY N/A 1/10/2023    Procedure: ESOPHAGOGASTRODUODENOSCOPY (EGD);  Surgeon: Kiersten Chauhan MD;  Location: [facility] (2ND FLR);  Service: Endoscopy;  Laterality: N/A;  Endoflip/Endoscopically placed manometry probe  2nd floor-End stage achalasia  propofol only  full liquid diet x3 days, clear liquid diet x1 day prior to procedure  instructions [facility] to [facility]    ESOPHAGOGASTRODUODENOSCOPY N/A 4/17/2023    Procedure: EGD (ESOPHAGOGASTRODUODENOSCOPY);  Surgeon: Anabela Chance MD;  [facility];  Service: General;  Laterality: N/A;    LAPAROSCOPIC HELLER ESOPHAGEAL MYOTOMY N/A 4/17/2023    Procedure: ESOPHAGOMYOTOMY, LAPAROSCOPIC, HELLER (redo);  Surgeon: Anabela Chance MD;  [facility];  Service: General;  Laterality: N/A;    LAPAROSCOPIC LYSIS OF ADHESIONS  4/17/2023    Procedure: LYSIS, ADHESIONS, LAPAROSCOPIC;  Surgeon: Anabela Chance MD;  [facility];  Service: General;;    LUNG SURGERY      age 12, partial pneumonectomy       Family History:  Family History   Problem Relation Age of Onset    Diabetes Mother     Heart disease Mother     Intracerebral hemorrhage Father      Hyperlipidemia Sister     Hyperlipidemia Sister     Breast cancer Maternal Grandmother     Esophageal cancer Neg Hx     Stomach cancer Neg Hx     Colon cancer Neg Hx     Colon polyps Neg Hx     Rectal cancer Neg Hx     Liver cancer Neg Hx     Pancreatic cancer Neg Hx     Irritable bowel syndrome Neg Hx     Celiac disease Neg Hx     Crohn's disease Neg Hx        Social History:  Social History     Tobacco Use    Smoking status: Never    Smokeless tobacco: Never   Substance Use Topics    Alcohol use: Yes     Comment: rare    Drug use: No        Review of Systems:  General: No fever, chills  Skin: No rashes  Chest:  Denies cough and sputum production  Heart: Denies chest pain  Resp: Denies dyspnea  Abdomen: Denies diarrhea, abdominal pain, hematemesis, or blood in stool.  Musculoskeletal: No joint stiffness or swelling. Denies back pain.  : see HPI  Neuro: no dizziness or weakness    Allergies:  Patient has no known allergies.    Medications:    Current Outpatient Medications:     atorvastatin (LIPITOR) 40 MG tablet, Take 1 tablet (40 mg total) by mouth once daily., Disp: 90 tablet, Rfl: 0    cetirizine (ZYRTEC) 10 MG tablet, Take 10 mg by mouth once daily., Disp: , Rfl:     hydrocodone-acetaminophen (HYCET) solution 7.5-325 mg/15mL, Take 10 mLs by mouth every 4 (four) hours as needed for Pain., Disp: 473 mL, Rfl: 0    ondansetron (ZOFRAN-ODT) 4 MG TbDL, Dissolve 1 tablet (4 mg total) by mouth every 6 (six) hours as needed., Disp: 30 tablet, Rfl: 0    Physical Exam:  Vitals:    06/20/23 1500   BP: 110/70   Pulse: 70     Body mass index is 24.39 kg/m².  General: awake, alert, cooperative  Head: NC/AT  Ears: external ears normal  Eyes: sclera normal  Lungs: normal inspiration, NAD  Heart: well-perfused  Abdomen: Soft, NT, ND   6/22: Normal circ'd phallus, meatus normal in size and position, BL testicles palpable, no masses, nontender, no abnormalities of epididymi  BOO 6/22: Normal rectal tone, no hemorrhoids.  Prostate smooth and normal, no nodules 50 gm SV not palpable. Perineum and anus normal.  Lymphatic: groin nodes negative  Skin: The skin is warm and dry  Ext: No c/c/e.  Neuro: grossly intact, AOx3    RADIOLOGY:  No recent relevant imaging available for review.    LABS:  I personally reviewed the following lab values:  Lab Results   Component Value Date    WBC 13.17 (H) 04/17/2023    HGB 13.0 (L) 04/17/2023    HCT 41.6 04/17/2023     04/17/2023     04/18/2023    K 4.1 04/18/2023     04/18/2023    CREATININE 0.9 04/18/2023    BUN 11 04/18/2023    CO2 23 04/18/2023    TSH 0.869 10/28/2009    PSA 5.8 (H) 05/02/2022    CHOL 162 10/28/2022    TRIG 76 10/28/2022    HDL 58 10/28/2022    ALT 14 05/02/2022    AST 18 05/02/2022       Assessment/Plan:   Morris Parada is a 67 y.o. male with:    Very low risk prostate cancer - PSA stable, follow-up three months with PSA    ?Prostatitis - cleared with doxycycline x 30 days    BPH - not interested in medications currently    ED - PRN viayordya    Neal Sanchez MD  Urology

## 2023-07-24 ENCOUNTER — OFFICE VISIT (OUTPATIENT)
Dept: INTERNAL MEDICINE | Facility: CLINIC | Age: 68
End: 2023-07-24
Payer: MEDICARE

## 2023-07-24 VITALS
OXYGEN SATURATION: 97 % | BODY MASS INDEX: 24.84 KG/M2 | TEMPERATURE: 98 F | HEIGHT: 70 IN | HEART RATE: 89 BPM | WEIGHT: 173.5 LBS | SYSTOLIC BLOOD PRESSURE: 122 MMHG | DIASTOLIC BLOOD PRESSURE: 72 MMHG

## 2023-07-24 DIAGNOSIS — E78.2 MIXED HYPERLIPIDEMIA: ICD-10-CM

## 2023-07-24 DIAGNOSIS — Z00.00 ROUTINE GENERAL MEDICAL EXAMINATION AT HEALTH CARE FACILITY: Primary | ICD-10-CM

## 2023-07-24 DIAGNOSIS — C61 PROSTATE CANCER: ICD-10-CM

## 2023-07-24 PROCEDURE — 99999 PR PBB SHADOW E&M-EST. PATIENT-LVL III: CPT | Mod: PBBFAC,HCNC,, | Performed by: INTERNAL MEDICINE

## 2023-07-24 PROCEDURE — 3074F SYST BP LT 130 MM HG: CPT | Mod: HCNC,CPTII,S$GLB, | Performed by: INTERNAL MEDICINE

## 2023-07-24 PROCEDURE — 99397 PER PM REEVAL EST PAT 65+ YR: CPT | Mod: HCNC,S$GLB,, | Performed by: INTERNAL MEDICINE

## 2023-07-24 PROCEDURE — 3288F PR FALLS RISK ASSESSMENT DOCUMENTED: ICD-10-PCS | Mod: HCNC,CPTII,S$GLB, | Performed by: INTERNAL MEDICINE

## 2023-07-24 PROCEDURE — 99999 PR PBB SHADOW E&M-EST. PATIENT-LVL III: ICD-10-PCS | Mod: PBBFAC,HCNC,, | Performed by: INTERNAL MEDICINE

## 2023-07-24 PROCEDURE — 3078F PR MOST RECENT DIASTOLIC BLOOD PRESSURE < 80 MM HG: ICD-10-PCS | Mod: HCNC,CPTII,S$GLB, | Performed by: INTERNAL MEDICINE

## 2023-07-24 PROCEDURE — 1160F RVW MEDS BY RX/DR IN RCRD: CPT | Mod: HCNC,CPTII,S$GLB, | Performed by: INTERNAL MEDICINE

## 2023-07-24 PROCEDURE — 1126F AMNT PAIN NOTED NONE PRSNT: CPT | Mod: HCNC,CPTII,S$GLB, | Performed by: INTERNAL MEDICINE

## 2023-07-24 PROCEDURE — 1101F PT FALLS ASSESS-DOCD LE1/YR: CPT | Mod: HCNC,CPTII,S$GLB, | Performed by: INTERNAL MEDICINE

## 2023-07-24 PROCEDURE — 1159F MED LIST DOCD IN RCRD: CPT | Mod: HCNC,CPTII,S$GLB, | Performed by: INTERNAL MEDICINE

## 2023-07-24 PROCEDURE — 99397 PR PREVENTIVE VISIT,EST,65 & OVER: ICD-10-PCS | Mod: HCNC,S$GLB,, | Performed by: INTERNAL MEDICINE

## 2023-07-24 PROCEDURE — 3008F BODY MASS INDEX DOCD: CPT | Mod: HCNC,CPTII,S$GLB, | Performed by: INTERNAL MEDICINE

## 2023-07-24 PROCEDURE — 1160F PR REVIEW ALL MEDS BY PRESCRIBER/CLIN PHARMACIST DOCUMENTED: ICD-10-PCS | Mod: HCNC,CPTII,S$GLB, | Performed by: INTERNAL MEDICINE

## 2023-07-24 PROCEDURE — 1126F PR PAIN SEVERITY QUANTIFIED, NO PAIN PRESENT: ICD-10-PCS | Mod: HCNC,CPTII,S$GLB, | Performed by: INTERNAL MEDICINE

## 2023-07-24 PROCEDURE — 3008F PR BODY MASS INDEX (BMI) DOCUMENTED: ICD-10-PCS | Mod: HCNC,CPTII,S$GLB, | Performed by: INTERNAL MEDICINE

## 2023-07-24 PROCEDURE — 1159F PR MEDICATION LIST DOCUMENTED IN MEDICAL RECORD: ICD-10-PCS | Mod: HCNC,CPTII,S$GLB, | Performed by: INTERNAL MEDICINE

## 2023-07-24 PROCEDURE — 1101F PR PT FALLS ASSESS DOC 0-1 FALLS W/OUT INJ PAST YR: ICD-10-PCS | Mod: HCNC,CPTII,S$GLB, | Performed by: INTERNAL MEDICINE

## 2023-07-24 PROCEDURE — 3288F FALL RISK ASSESSMENT DOCD: CPT | Mod: HCNC,CPTII,S$GLB, | Performed by: INTERNAL MEDICINE

## 2023-07-24 PROCEDURE — 3074F PR MOST RECENT SYSTOLIC BLOOD PRESSURE < 130 MM HG: ICD-10-PCS | Mod: HCNC,CPTII,S$GLB, | Performed by: INTERNAL MEDICINE

## 2023-07-24 PROCEDURE — 3078F DIAST BP <80 MM HG: CPT | Mod: HCNC,CPTII,S$GLB, | Performed by: INTERNAL MEDICINE

## 2023-07-24 NOTE — PROGRESS NOTES
"Subjective:       Patient ID: Morris Parada is a 68 y.o. male.    Chief Complaint: Follow-up      HPI:  Patient is a 68-year-old male presenting today for updated physical exam review of chronic health issues.  Patient has history of hyperlipidemia and prostate cancer.  He also had a prior history of achalasia and has undergone a myotomy since his last visit.  He indicates he is doing very well.  He is eating well without any real problems from the surgery.  He has not had any issues to speak of.  He stated did take him a little time to get over the surgery but he is back in the normal actions at this point.      He continues to follow with Dr. Sanchez in regards to his prostate cancer.  He is chosen to will hold on active treatment at this time and instead doing active monitoring.  PSA did go up a little bit with a acute prostatitis but they are not overly concerned about that shift at this time.  He relates no other significant issues at this point.    Review of Systems   Constitutional:  Negative for fever and unexpected weight change.   HENT:  Negative for hearing loss, postnasal drip and rhinorrhea.    Eyes:  Negative for pain and visual disturbance.   Respiratory:  Negative for cough, shortness of breath and wheezing.    Cardiovascular:  Negative for chest pain and palpitations.   Gastrointestinal:  Negative for constipation, diarrhea, nausea and vomiting.   Genitourinary:  Negative for dysuria and hematuria.   Musculoskeletal:  Negative for arthralgias, back pain, myalgias and neck stiffness.   Skin:  Negative for pallor and rash.   Neurological:  Negative for seizures, syncope and headaches.   Hematological:  Negative for adenopathy.   Psychiatric/Behavioral:  Negative for dysphoric mood. The patient is not nervous/anxious.      Objective:   /72 (BP Location: Left arm, Patient Position: Sitting, BP Method: Medium (Manual))   Pulse 89   Temp 97.6 °F (36.4 °C) (Tympanic)   Ht 5' 10" (1.778 m)   Wt " 78.7 kg (173 lb 8 oz)   SpO2 97%   BMI 24.89 kg/m²      Physical Exam  Vitals reviewed.   Constitutional:       General: He is not in acute distress.     Appearance: He is well-developed.   HENT:      Head: Normocephalic and atraumatic.      Right Ear: Tympanic membrane and ear canal normal.      Left Ear: Tympanic membrane and ear canal normal.   Eyes:      Pupils: Pupils are equal, round, and reactive to light.   Neck:      Thyroid: No thyromegaly.      Vascular: No JVD.   Cardiovascular:      Rate and Rhythm: Normal rate and regular rhythm.      Heart sounds: Normal heart sounds. No murmur heard.    No friction rub. No gallop.   Pulmonary:      Effort: Pulmonary effort is normal.      Breath sounds: Normal breath sounds. No wheezing or rales.   Abdominal:      General: Bowel sounds are normal. There is no distension.      Palpations: Abdomen is soft.      Tenderness: There is no abdominal tenderness. There is no guarding or rebound.   Musculoskeletal:         General: Normal range of motion.      Cervical back: Normal range of motion and neck supple.   Lymphadenopathy:      Cervical: No cervical adenopathy.   Skin:     General: Skin is warm and dry.      Findings: No rash.   Neurological:      General: No focal deficit present.      Mental Status: He is alert and oriented to person, place, and time.      Cranial Nerves: No cranial nerve deficit.      Deep Tendon Reflexes: Reflexes are normal and symmetric.   Psychiatric:         Mood and Affect: Mood normal.         Judgment: Judgment normal.       No visits with results within 2 Week(s) from this visit.   Latest known visit with results is:   Lab Visit on 06/13/2023   Component Date Value    PSA Diagnostic 06/13/2023 6.4 (H)        Assessment:       1. Routine general medical examination at health care facility    2. Mixed hyperlipidemia    3. Prostate cancer        Plan:   No problem-specific Assessment & Plan notes found for this encounter.    Routine  general medical examination at health care facility  Comments:  Focus on good healht habits, low fat diet, regular exercise, seatbelt use, sunscreen use    Mixed hyperlipidemia  Comments:  Contiue lipitor  Orders:  -     CBC Auto Differential; Future; Expected date: 11/01/2023  -     Comprehensive Metabolic Panel; Future; Expected date: 11/01/2023  -     Lipid Panel; Future; Expected date: 11/01/2023    Prostate cancer  Comments:  Monitoring PSA regularly with Dr. Sanchez.  Focused on any changes to numbers with time.          Follow up in about 1 year (around 7/24/2024).

## 2023-07-25 ENCOUNTER — LAB VISIT (OUTPATIENT)
Dept: LAB | Facility: HOSPITAL | Age: 68
End: 2023-07-25
Attending: INTERNAL MEDICINE
Payer: MEDICARE

## 2023-07-25 DIAGNOSIS — E78.2 MIXED HYPERLIPIDEMIA: ICD-10-CM

## 2023-07-25 LAB
ALBUMIN SERPL BCP-MCNC: 3.8 G/DL (ref 3.5–5.2)
ALP SERPL-CCNC: 84 U/L (ref 55–135)
ALT SERPL W/O P-5'-P-CCNC: 27 U/L (ref 10–44)
ANION GAP SERPL CALC-SCNC: 10 MMOL/L (ref 8–16)
AST SERPL-CCNC: 22 U/L (ref 10–40)
BASOPHILS # BLD AUTO: 0.04 K/UL (ref 0–0.2)
BASOPHILS NFR BLD: 0.7 % (ref 0–1.9)
BILIRUB SERPL-MCNC: 0.5 MG/DL (ref 0.1–1)
BUN SERPL-MCNC: 10 MG/DL (ref 8–23)
CALCIUM SERPL-MCNC: 9.1 MG/DL (ref 8.7–10.5)
CHLORIDE SERPL-SCNC: 104 MMOL/L (ref 95–110)
CHOLEST SERPL-MCNC: 161 MG/DL (ref 120–199)
CHOLEST/HDLC SERPL: 2.7 {RATIO} (ref 2–5)
CO2 SERPL-SCNC: 27 MMOL/L (ref 23–29)
CREAT SERPL-MCNC: 0.9 MG/DL (ref 0.5–1.4)
DIFFERENTIAL METHOD: ABNORMAL
EOSINOPHIL # BLD AUTO: 0.3 K/UL (ref 0–0.5)
EOSINOPHIL NFR BLD: 5.4 % (ref 0–8)
ERYTHROCYTE [DISTWIDTH] IN BLOOD BY AUTOMATED COUNT: 13.5 % (ref 11.5–14.5)
EST. GFR  (NO RACE VARIABLE): >60 ML/MIN/1.73 M^2
GLUCOSE SERPL-MCNC: 89 MG/DL (ref 70–110)
HCT VFR BLD AUTO: 39.9 % (ref 40–54)
HDLC SERPL-MCNC: 60 MG/DL (ref 40–75)
HDLC SERPL: 37.3 % (ref 20–50)
HGB BLD-MCNC: 12.4 G/DL (ref 14–18)
IMM GRANULOCYTES # BLD AUTO: 0.01 K/UL (ref 0–0.04)
IMM GRANULOCYTES NFR BLD AUTO: 0.2 % (ref 0–0.5)
LDLC SERPL CALC-MCNC: 86.4 MG/DL (ref 63–159)
LYMPHOCYTES # BLD AUTO: 1.9 K/UL (ref 1–4.8)
LYMPHOCYTES NFR BLD: 31.8 % (ref 18–48)
MCH RBC QN AUTO: 27.9 PG (ref 27–31)
MCHC RBC AUTO-ENTMCNC: 31.1 G/DL (ref 32–36)
MCV RBC AUTO: 90 FL (ref 82–98)
MONOCYTES # BLD AUTO: 0.5 K/UL (ref 0.3–1)
MONOCYTES NFR BLD: 9.1 % (ref 4–15)
NEUTROPHILS # BLD AUTO: 3.1 K/UL (ref 1.8–7.7)
NEUTROPHILS NFR BLD: 52.8 % (ref 38–73)
NONHDLC SERPL-MCNC: 101 MG/DL
NRBC BLD-RTO: 0 /100 WBC
PLATELET # BLD AUTO: 175 K/UL (ref 150–450)
PMV BLD AUTO: 10.2 FL (ref 9.2–12.9)
POTASSIUM SERPL-SCNC: 4.2 MMOL/L (ref 3.5–5.1)
PROT SERPL-MCNC: 6.8 G/DL (ref 6–8.4)
RBC # BLD AUTO: 4.45 M/UL (ref 4.6–6.2)
SODIUM SERPL-SCNC: 141 MMOL/L (ref 136–145)
TRIGL SERPL-MCNC: 73 MG/DL (ref 30–150)
WBC # BLD AUTO: 5.92 K/UL (ref 3.9–12.7)

## 2023-07-25 PROCEDURE — 85025 COMPLETE CBC W/AUTO DIFF WBC: CPT | Mod: HCNC | Performed by: INTERNAL MEDICINE

## 2023-07-25 PROCEDURE — 80061 LIPID PANEL: CPT | Mod: HCNC | Performed by: INTERNAL MEDICINE

## 2023-07-25 PROCEDURE — 80053 COMPREHEN METABOLIC PANEL: CPT | Mod: HCNC | Performed by: INTERNAL MEDICINE

## 2023-07-25 PROCEDURE — 36415 COLL VENOUS BLD VENIPUNCTURE: CPT | Mod: HCNC,PO | Performed by: INTERNAL MEDICINE

## 2023-07-26 ENCOUNTER — PATIENT MESSAGE (OUTPATIENT)
Dept: INTERNAL MEDICINE | Facility: CLINIC | Age: 68
End: 2023-07-26
Payer: MEDICARE

## 2023-07-26 DIAGNOSIS — D64.9 CHRONIC ANEMIA: Primary | ICD-10-CM

## 2023-07-26 NOTE — TELEPHONE ENCOUNTER
Pt refused the earliest appointment and wanted the next one in august. Pt scheduled and notified by phone

## 2023-07-27 ENCOUNTER — TELEPHONE (OUTPATIENT)
Dept: HEMATOLOGY/ONCOLOGY | Facility: CLINIC | Age: 68
End: 2023-07-27
Payer: MEDICARE

## 2023-07-27 DIAGNOSIS — D64.9 ANEMIA: Primary | ICD-10-CM

## 2023-07-27 NOTE — TELEPHONE ENCOUNTER
Spoke to patient in reference to needing additional/updated labs completed prior to Hematology appt. Lab orders placed.  Appt scheduled per request in [place] notice via pt portal.

## 2023-08-11 ENCOUNTER — LAB VISIT (OUTPATIENT)
Dept: LAB | Facility: HOSPITAL | Age: 68
End: 2023-08-11
Attending: NURSE PRACTITIONER
Payer: MEDICARE

## 2023-08-11 DIAGNOSIS — D64.9 ANEMIA: ICD-10-CM

## 2023-08-11 LAB
BASOPHILS # BLD AUTO: 0.04 K/UL (ref 0–0.2)
BASOPHILS NFR BLD: 0.6 % (ref 0–1.9)
DIFFERENTIAL METHOD: ABNORMAL
EOSINOPHIL # BLD AUTO: 0.3 K/UL (ref 0–0.5)
EOSINOPHIL NFR BLD: 4.8 % (ref 0–8)
ERYTHROCYTE [DISTWIDTH] IN BLOOD BY AUTOMATED COUNT: 13.6 % (ref 11.5–14.5)
HCT VFR BLD AUTO: 41.6 % (ref 40–54)
HGB BLD-MCNC: 13 G/DL (ref 14–18)
IMM GRANULOCYTES # BLD AUTO: 0.01 K/UL (ref 0–0.04)
IMM GRANULOCYTES NFR BLD AUTO: 0.2 % (ref 0–0.5)
IRON SERPL-MCNC: 79 UG/DL (ref 45–160)
LYMPHOCYTES # BLD AUTO: 1.8 K/UL (ref 1–4.8)
LYMPHOCYTES NFR BLD: 27.2 % (ref 18–48)
MCH RBC QN AUTO: 27.4 PG (ref 27–31)
MCHC RBC AUTO-ENTMCNC: 31.3 G/DL (ref 32–36)
MCV RBC AUTO: 88 FL (ref 82–98)
MONOCYTES # BLD AUTO: 0.6 K/UL (ref 0.3–1)
MONOCYTES NFR BLD: 9.7 % (ref 4–15)
NEUTROPHILS # BLD AUTO: 3.7 K/UL (ref 1.8–7.7)
NEUTROPHILS NFR BLD: 57.5 % (ref 38–73)
NRBC BLD-RTO: 0 /100 WBC
PLATELET # BLD AUTO: 184 K/UL (ref 150–450)
PMV BLD AUTO: 9.6 FL (ref 9.2–12.9)
RBC # BLD AUTO: 4.74 M/UL (ref 4.6–6.2)
SATURATED IRON: 25 % (ref 20–50)
TOTAL IRON BINDING CAPACITY: 317 UG/DL (ref 250–450)
TRANSFERRIN SERPL-MCNC: 214 MG/DL (ref 200–375)
WBC # BLD AUTO: 6.47 K/UL (ref 3.9–12.7)

## 2023-08-11 PROCEDURE — 83540 ASSAY OF IRON: CPT | Mod: HCNC | Performed by: NURSE PRACTITIONER

## 2023-08-11 PROCEDURE — 36415 COLL VENOUS BLD VENIPUNCTURE: CPT | Mod: HCNC,PO | Performed by: NURSE PRACTITIONER

## 2023-08-11 PROCEDURE — 82728 ASSAY OF FERRITIN: CPT | Mod: HCNC | Performed by: NURSE PRACTITIONER

## 2023-08-11 PROCEDURE — 85025 COMPLETE CBC W/AUTO DIFF WBC: CPT | Mod: HCNC | Performed by: NURSE PRACTITIONER

## 2023-08-11 PROCEDURE — 84466 ASSAY OF TRANSFERRIN: CPT | Mod: HCNC | Performed by: NURSE PRACTITIONER

## 2023-08-12 LAB — FERRITIN SERPL-MCNC: 172 NG/ML (ref 20–300)

## 2023-08-17 ENCOUNTER — OFFICE VISIT (OUTPATIENT)
Dept: HEMATOLOGY/ONCOLOGY | Facility: CLINIC | Age: 68
End: 2023-08-17
Payer: MEDICARE

## 2023-08-17 ENCOUNTER — LAB VISIT (OUTPATIENT)
Dept: LAB | Facility: HOSPITAL | Age: 68
End: 2023-08-17
Attending: NURSE PRACTITIONER
Payer: MEDICARE

## 2023-08-17 VITALS
DIASTOLIC BLOOD PRESSURE: 74 MMHG | OXYGEN SATURATION: 97 % | HEIGHT: 70 IN | BODY MASS INDEX: 24.84 KG/M2 | WEIGHT: 173.5 LBS | HEART RATE: 66 BPM | SYSTOLIC BLOOD PRESSURE: 116 MMHG

## 2023-08-17 DIAGNOSIS — K22.0 ACHALASIA: ICD-10-CM

## 2023-08-17 DIAGNOSIS — D64.9 ANEMIA, UNSPECIFIED TYPE: ICD-10-CM

## 2023-08-17 DIAGNOSIS — D64.9 CHRONIC ANEMIA: ICD-10-CM

## 2023-08-17 DIAGNOSIS — C61 PROSTATE CANCER: Primary | ICD-10-CM

## 2023-08-17 LAB
HAPTOGLOB SERPL-MCNC: 48 MG/DL (ref 30–250)
HCYS SERPL-SCNC: 6.6 UMOL/L (ref 4–16.5)
LDH SERPL L TO P-CCNC: 221 U/L (ref 110–260)
TSH SERPL DL<=0.005 MIU/L-ACNC: 0.98 UIU/ML (ref 0.4–4)

## 2023-08-17 PROCEDURE — 1126F AMNT PAIN NOTED NONE PRSNT: CPT | Mod: HCNC,CPTII,S$GLB, | Performed by: NURSE PRACTITIONER

## 2023-08-17 PROCEDURE — 3074F SYST BP LT 130 MM HG: CPT | Mod: HCNC,CPTII,S$GLB, | Performed by: NURSE PRACTITIONER

## 2023-08-17 PROCEDURE — 3008F BODY MASS INDEX DOCD: CPT | Mod: HCNC,CPTII,S$GLB, | Performed by: NURSE PRACTITIONER

## 2023-08-17 PROCEDURE — 1160F PR REVIEW ALL MEDS BY PRESCRIBER/CLIN PHARMACIST DOCUMENTED: ICD-10-PCS | Mod: HCNC,CPTII,S$GLB, | Performed by: NURSE PRACTITIONER

## 2023-08-17 PROCEDURE — 3288F FALL RISK ASSESSMENT DOCD: CPT | Mod: HCNC,CPTII,S$GLB, | Performed by: NURSE PRACTITIONER

## 2023-08-17 PROCEDURE — 1101F PR PT FALLS ASSESS DOC 0-1 FALLS W/OUT INJ PAST YR: ICD-10-PCS | Mod: HCNC,CPTII,S$GLB, | Performed by: NURSE PRACTITIONER

## 2023-08-17 PROCEDURE — 3008F PR BODY MASS INDEX (BMI) DOCUMENTED: ICD-10-PCS | Mod: HCNC,CPTII,S$GLB, | Performed by: NURSE PRACTITIONER

## 2023-08-17 PROCEDURE — 83090 ASSAY OF HOMOCYSTEINE: CPT | Mod: HCNC | Performed by: NURSE PRACTITIONER

## 2023-08-17 PROCEDURE — 99999 PR PBB SHADOW E&M-EST. PATIENT-LVL III: CPT | Mod: PBBFAC,HCNC,, | Performed by: NURSE PRACTITIONER

## 2023-08-17 PROCEDURE — 3288F PR FALLS RISK ASSESSMENT DOCUMENTED: ICD-10-PCS | Mod: HCNC,CPTII,S$GLB, | Performed by: NURSE PRACTITIONER

## 2023-08-17 PROCEDURE — 3078F PR MOST RECENT DIASTOLIC BLOOD PRESSURE < 80 MM HG: ICD-10-PCS | Mod: HCNC,CPTII,S$GLB, | Performed by: NURSE PRACTITIONER

## 2023-08-17 PROCEDURE — 83921 ORGANIC ACID SINGLE QUANT: CPT | Mod: HCNC | Performed by: NURSE PRACTITIONER

## 2023-08-17 PROCEDURE — 83020 HEMOGLOBIN ELECTROPHORESIS: CPT | Mod: 91,HCNC | Performed by: NURSE PRACTITIONER

## 2023-08-17 PROCEDURE — 84443 ASSAY THYROID STIM HORMONE: CPT | Mod: HCNC | Performed by: NURSE PRACTITIONER

## 2023-08-17 PROCEDURE — 83615 LACTATE (LD) (LDH) ENZYME: CPT | Mod: HCNC | Performed by: NURSE PRACTITIONER

## 2023-08-17 PROCEDURE — 83020 HEMOGLOBIN ELECTROPHORESIS: CPT | Mod: HCNC | Performed by: NURSE PRACTITIONER

## 2023-08-17 PROCEDURE — 83010 ASSAY OF HAPTOGLOBIN QUANT: CPT | Mod: HCNC | Performed by: NURSE PRACTITIONER

## 2023-08-17 PROCEDURE — 36415 COLL VENOUS BLD VENIPUNCTURE: CPT | Mod: HCNC,PO | Performed by: NURSE PRACTITIONER

## 2023-08-17 PROCEDURE — 1126F PR PAIN SEVERITY QUANTIFIED, NO PAIN PRESENT: ICD-10-PCS | Mod: HCNC,CPTII,S$GLB, | Performed by: NURSE PRACTITIONER

## 2023-08-17 PROCEDURE — 99205 PR OFFICE/OUTPT VISIT, NEW, LEVL V, 60-74 MIN: ICD-10-PCS | Mod: HCNC,S$GLB,, | Performed by: NURSE PRACTITIONER

## 2023-08-17 PROCEDURE — 3078F DIAST BP <80 MM HG: CPT | Mod: HCNC,CPTII,S$GLB, | Performed by: NURSE PRACTITIONER

## 2023-08-17 PROCEDURE — 82728 ASSAY OF FERRITIN: CPT | Mod: HCNC | Performed by: NURSE PRACTITIONER

## 2023-08-17 PROCEDURE — 99205 OFFICE O/P NEW HI 60 MIN: CPT | Mod: HCNC,S$GLB,, | Performed by: NURSE PRACTITIONER

## 2023-08-17 PROCEDURE — 1101F PT FALLS ASSESS-DOCD LE1/YR: CPT | Mod: HCNC,CPTII,S$GLB, | Performed by: NURSE PRACTITIONER

## 2023-08-17 PROCEDURE — 1159F MED LIST DOCD IN RCRD: CPT | Mod: HCNC,CPTII,S$GLB, | Performed by: NURSE PRACTITIONER

## 2023-08-17 PROCEDURE — 1159F PR MEDICATION LIST DOCUMENTED IN MEDICAL RECORD: ICD-10-PCS | Mod: HCNC,CPTII,S$GLB, | Performed by: NURSE PRACTITIONER

## 2023-08-17 PROCEDURE — 3074F PR MOST RECENT SYSTOLIC BLOOD PRESSURE < 130 MM HG: ICD-10-PCS | Mod: HCNC,CPTII,S$GLB, | Performed by: NURSE PRACTITIONER

## 2023-08-17 PROCEDURE — 1160F RVW MEDS BY RX/DR IN RCRD: CPT | Mod: HCNC,CPTII,S$GLB, | Performed by: NURSE PRACTITIONER

## 2023-08-17 PROCEDURE — 99999 PR PBB SHADOW E&M-EST. PATIENT-LVL III: ICD-10-PCS | Mod: PBBFAC,HCNC,, | Performed by: NURSE PRACTITIONER

## 2023-08-17 NOTE — PROGRESS NOTES
Subjective:      Patient ID: Morris Parada is a 68 y.o. male.    Chief Complaint: lab abnormality    HPI:  68 year old male who presents to the hematology department as a new patient for further evaluation and management of chronic anemia.  He has been referred to the hematology department by his PCP, Dr. Miller.      Noted chronic normocytic anemia with hgb ranging from 12-13.8.  Iron studies normal.  No monoclonal proteins.     With elevated psa and prostate cancer proven biopsy Prostate, right base, biopsy 8/16/2022:   - Small focus of prostate adenocarcinoma, Rock grade 3 + 3 = 6, involving   <5% total of 1/2 cores   - Perineural invasion not identified   Being followed by Dr. Sanchez    Had recent esophageal surgery - heller myotomy and had stretched esophagus and is currently having sharp pain in area where there is scar tissue.  He is going to be seeing GI Dr. Kiersten Chauhan soon to discuss.      States that he sister has sickle cell.      Has occasional glass of wine.  Denies cigarette smoking.  Works as a .     Family hx of cancer:  Self: prostate cancer    Has a history of H. Pylori.  Has a h/o esophageal reflux and now diagnosed with achalasia.       Denies f/c/ns or unintentional weight loss.  Denies any known abnormal lymphadenopathy.     Up to date with age appropriate cancer screenings.    Social History     Socioeconomic History    Marital status:     Number of children: 3   Occupational History     Employer: [employer]    Occupation: [employer]   Tobacco Use    Smoking status: Never    Smokeless tobacco: Never   Substance and Sexual Activity    Alcohol use: Yes     Comment: rare    Drug use: No    Sexual activity: Yes     Partners: Female   Social History Narrative    ** Merged History Encounter **          Social Determinants of Health     Financial Resource Strain: Low Risk  (4/17/2023)    Overall Financial Resource Strain (CARDIA)     Difficulty of Paying Living  Expenses: Not hard at all   Food Insecurity: No Food Insecurity (4/17/2023)    Hunger Vital Sign     Worried About Running Out of Food in the Last Year: Never true     Ran Out of Food in the Last Year: Never true   Transportation Needs: No Transportation Needs (4/17/2023)    PRAPARE - Transportation     Lack of Transportation (Medical): No     Lack of Transportation (Non-Medical): No   Physical Activity: Sufficiently Active (4/17/2023)    Exercise Vital Sign     Days of Exercise per Week: 5 days     Minutes of Exercise per Session: 30 min   Stress: No Stress Concern Present (4/17/2023)    [employer] [place] [employer] - Occupational Stress Questionnaire     Feeling of Stress : Not at all   Social Connections: Unknown (4/17/2023)    Social Connection and Isolation Panel [NHANES]     Frequency of Communication with Friends and Family: More than three times a week     Frequency of Social Gatherings with Friends and Family: Once a week     Active Member of Clubs or Organizations: Yes     Attends Club or Organization Meetings: Never     Marital Status:    Housing Stability: Low Risk  (4/17/2023)    Housing Stability Vital Sign     Unable to Pay for Housing in the Last Year: No     Number of Places Lived in the Last Year: 1     Unstable Housing in the Last Year: No       Family History   Problem Relation Age of Onset    Diabetes Mother     Heart disease Mother     Intracerebral hemorrhage Father     Hyperlipidemia Sister     Hyperlipidemia Sister     Breast cancer Maternal Grandmother     Esophageal cancer Neg Hx     Stomach cancer Neg Hx     Colon cancer Neg Hx     Colon polyps Neg Hx     Rectal cancer Neg Hx     Liver cancer Neg Hx     Pancreatic cancer Neg Hx     Irritable bowel syndrome Neg Hx     Celiac disease Neg Hx     Crohn's disease Neg Hx        Past Surgical History:   Procedure Laterality Date    ESOPHAGEAL DILATION      ESOPHAGEAL MANOMETRY WITH MEASUREMENT  OF IMPEDANCE N/A 1/10/2023    Procedure: MANOMETRY-ESOPHAGEAL-WITH IMPEDANCE;  Surgeon: Kiersten Chauhan MD;  Location: [facility] (2ND FLR);  Service: Endoscopy;  Laterality: N/A;    ESOPHAGEAL MOTILITY STUDY USING HIGH RESOLUTION MANOMETRY N/A 7/6/2022    Procedure: MOTILITY STUDY, ESOPHAGUS, USING HIGH RESOLUTION MANOMETRY;  Surgeon: Jaqui Shelton MD;  Location: [facility];  Service: Endoscopy;  Laterality: N/A;    ESOPHAGOGASTRODUODENOSCOPY N/A 07/24/2021    Procedure: EGD (ESOPHAGOGASTRODUODENOSCOPY);  Surgeon: Felipe Briscoe III, MD;  Location: [facility];  Service: Endoscopy;  Laterality: N/A;    ESOPHAGOGASTRODUODENOSCOPY N/A 7/6/2022    Procedure: EGD (ESOPHAGOGASTRODUODENOSCOPY);  Surgeon: Jaqui Shelton MD;  Location: [facility];  Service: Endoscopy;  Laterality: N/A;    ESOPHAGOGASTRODUODENOSCOPY N/A 1/10/2023    Procedure: ESOPHAGOGASTRODUODENOSCOPY (EGD);  Surgeon: Kiersten Chauhan MD;  Location: [facility] (2ND FLR);  Service: Endoscopy;  Laterality: N/A;  Endoflip/Endoscopically placed manometry probe  2nd floor-End stage achalasia  propofol only  full liquid diet x3 days, clear liquid diet x1 day prior to procedure  instructions [facility] to [facility]    ESOPHAGOGASTRODUODENOSCOPY N/A 4/17/2023    Procedure: EGD (ESOPHAGOGASTRODUODENOSCOPY);  Surgeon: Anabela Chance MD;  Location: [facility] OR 2ND FLR;  Service: General;  Laterality: N/A;    LAPAROSCOPIC HELLER ESOPHAGEAL MYOTOMY N/A 4/17/2023    Procedure: ESOPHAGOMYOTOMY, LAPAROSCOPIC, HELLER (redo);  Surgeon: Anabela Chance MD;  Location: [facility] OR 2ND FLR;  Service: General;  Laterality: N/A;    LAPAROSCOPIC LYSIS OF ADHESIONS  4/17/2023    Procedure: LYSIS, ADHESIONS, LAPAROSCOPIC;  Surgeon: Anabela Chance MD;  Location: [facility] OR 2ND FLR;  Service: General;;    LUNG SURGERY      age 12, partial pneumonectomy       Past Medical History:   Diagnosis Date    Dysphagia     Esophageal  diverticulum     Helicobacter pylori ab+     Hyperlipidemia     Pityriasis rosea        Review of Systems   Constitutional:  Negative for appetite change and unexpected weight change.   HENT:  Negative for mouth sores.    Eyes:  Negative for visual disturbance.   Respiratory:  Negative for cough and shortness of breath.    Cardiovascular:  Negative for chest pain.   Gastrointestinal:  Negative for abdominal pain and diarrhea.   Endocrine: Negative.    Genitourinary:  Negative for frequency.   Musculoskeletal:  Negative for back pain.   Skin:  Negative for rash.   Allergic/Immunologic: Negative.    Neurological:  Negative for headaches.   Hematological:  Negative for adenopathy.   Psychiatric/Behavioral:  The patient is not nervous/anxious.           Medication List with Changes/Refills   Current Medications    ATORVASTATIN (LIPITOR) 40 MG TABLET    Take 1 tablet (40 mg total) by mouth once daily.    CETIRIZINE (ZYRTEC) 10 MG TABLET    Take 10 mg by mouth once daily.    ONDANSETRON (ZOFRAN-ODT) 4 MG TBDL    Dissolve 1 tablet (4 mg total) by mouth every 6 (six) hours as needed.        Objective:     Vitals:    08/17/23 0735   BP: 116/74   Pulse: 66       Physical Exam  Vitals reviewed.   Constitutional:       Appearance: Normal appearance.   HENT:      Head: Normocephalic and atraumatic.      Right Ear: External ear normal.      Left Ear: External ear normal.   Cardiovascular:      Rate and Rhythm: Normal rate and regular rhythm.      Heart sounds: Normal heart sounds, S1 normal and S2 normal.   Pulmonary:      Effort: Pulmonary effort is normal.      Breath sounds: Normal breath sounds.   Abdominal:      General: There is no distension.   Musculoskeletal:         General: Normal range of motion.      Cervical back: Normal range of motion.   Lymphadenopathy:      Head:      Right side of head: No submental, submandibular, tonsillar, preauricular, posterior auricular or occipital adenopathy.      Left side of head:  No submental, submandibular, tonsillar, preauricular, posterior auricular or occipital adenopathy.      Cervical: No cervical adenopathy.      Upper Body:      Right upper body: No supraclavicular or pectoral adenopathy.      Left upper body: No supraclavicular or pectoral adenopathy.   Skin:     General: Skin is warm and dry.   Neurological:      General: No focal deficit present.      Mental Status: He is alert and oriented to person, place, and time.   Psychiatric:         Attention and Perception: Attention and perception normal.         Mood and Affect: Mood and affect normal.         Speech: Speech normal.         Behavior: Behavior normal. Behavior is cooperative.         Thought Content: Thought content normal.         Cognition and Memory: Cognition and memory normal.         Judgment: Judgment normal.       Assessment:     Problem List Items Addressed This Visit          Oncology    Prostate cancer - Primary       GI    Achalasia     Other Visit Diagnoses       Chronic anemia        Relevant Orders    Thalasseima and Hemoglobinopathy Eval    Hemoglobin Electrophoresis,Hgb A2 Chemo.    Methylmalonic Acid, Serum    Homocysteine, Serum    Haptoglobin    Lactate Dehydrogenase    Anemia, unspecified type        Relevant Orders    TSH    Thalasseima and Hemoglobinopathy Eval    Hemoglobin Electrophoresis,Hgb A2 Chemo.            Lab Results   Component Value Date    WBC 6.47 08/11/2023    RBC 4.74 08/11/2023    HGB 13.0 (L) 08/11/2023    HCT 41.6 08/11/2023    MCV 88 08/11/2023    MCH 27.4 08/11/2023    MCHC 31.3 (L) 08/11/2023    RDW 13.6 08/11/2023     08/11/2023    MPV 9.6 08/11/2023    GRAN 3.7 08/11/2023    GRAN 57.5 08/11/2023    LYMPH 1.8 08/11/2023    LYMPH 27.2 08/11/2023    MONO 0.6 08/11/2023    MONO 9.7 08/11/2023    EOS 0.3 08/11/2023    BASO 0.04 08/11/2023    EOSINOPHIL 4.8 08/11/2023    BASOPHIL 0.6 08/11/2023      Lab Results   Component Value Date     07/25/2023    K 4.2 07/25/2023      07/25/2023    CO2 27 07/25/2023    BUN 10 07/25/2023    CREATININE 0.9 07/25/2023    CALCIUM 9.1 07/25/2023    ANIONGAP 10 07/25/2023    ESTGFRAFRICA >60.0 05/02/2022    EGFRNONAA >60.0 05/02/2022     Lab Results   Component Value Date    ALT 27 07/25/2023    AST 22 07/25/2023    ALKPHOS 84 07/25/2023    BILITOT 0.5 07/25/2023     Lab Results   Component Value Date    IRON 79 08/11/2023    TRANSFERRIN 214 08/11/2023    TIBC 317 08/11/2023    FESATURATED 25 08/11/2023      Lab Results   Component Value Date    FERRITIN 172 08/11/2023     Lab Results   Component Value Date    [id number] 423 09/07/2022       Plan:   Prostate cancer    Chronic anemia  -     Ambulatory referral/consult to Hematology / Oncology  -     Thalasseima and Hemoglobinopathy Eval; Future; Expected date: 08/17/2023  -     Hemoglobin Electrophoresis,Hgb A2 Chemo.; Future; Expected date: 08/17/2023  -     Methylmalonic Acid, Serum; Future; Expected date: 08/17/2023  -     Homocysteine, Serum; Future; Expected date: 08/17/2023  -     Haptoglobin; Future; Expected date: 08/17/2023  -     Lactate Dehydrogenase; Future; Expected date: 08/17/2023    Anemia, unspecified type  -     TSH; Future; Expected date: 08/17/2023  -     Thalasseima and Hemoglobinopathy Eval; Future; Expected date: 08/17/2023  -     Hemoglobin Electrophoresis,Hgb A2 Chemo.; Future; Expected date: 08/17/2023    Achalasia      Med Onc Chart Routing      Follow up with physician    Follow up with SERGIO . F/u in 3 weeks with a VV.  Patient would like a 7am slot   Infusion scheduling note   n/a   Injection scheduling note n/a   Labs   Scheduling:  Preferred lab: [facility]  Lab interval:  labs today   Imaging   N/a   Pharmacy appointment No pharmacy appointment needed      Other referrals     No additional referrals needed  n/a          Collaborating Provider:  Dr. Alexander Vasquez    Thank You,  Kymberly Gee, FNP-C  Hematology Oncology

## 2023-08-21 LAB
FERRITIN SERPL-MCNC: 195 MCG/L (ref 31–409)
HGB A MFR BLD ELPH: 97.3 % (ref 95.8–98)
HGB A2 MFR BLD HPLC: 2.8 % (ref 2.2–3.2)
HGB A2 MFR BLD: 2.7 % (ref 2–3.3)
HGB A2+XXX MFR BLD ELPH: NORMAL %
HGB F MFR BLD: 0 % (ref 0–0.9)
HGB FRACT BLD ELPH-IMP: NORMAL
HGB FRACT BLD ELPH-IMP: NORMAL
HGB XXX MFR BLD ELPH: NORMAL %
HPLC HB VARIANT: NORMAL
PATH REV BLD -IMP: NORMAL
PROVIDER SIGNING NAME: NORMAL

## 2023-08-22 LAB — METHYLMALONATE SERPL-SCNC: 0.1 UMOL/L

## 2023-08-23 ENCOUNTER — OFFICE VISIT (OUTPATIENT)
Dept: SURGERY | Facility: CLINIC | Age: 68
End: 2023-08-23
Payer: MEDICARE

## 2023-08-23 VITALS
DIASTOLIC BLOOD PRESSURE: 67 MMHG | BODY MASS INDEX: 24.71 KG/M2 | SYSTOLIC BLOOD PRESSURE: 123 MMHG | HEART RATE: 76 BPM | TEMPERATURE: 98 F | WEIGHT: 172.63 LBS | HEIGHT: 70 IN | OXYGEN SATURATION: 97 %

## 2023-08-23 DIAGNOSIS — R10.9 ABDOMINAL WALL PAIN: Primary | ICD-10-CM

## 2023-08-23 DIAGNOSIS — K22.0 ACHALASIA: ICD-10-CM

## 2023-08-23 PROCEDURE — 3074F PR MOST RECENT SYSTOLIC BLOOD PRESSURE < 130 MM HG: ICD-10-PCS | Mod: HCNC,CPTII,S$GLB, | Performed by: SURGERY

## 2023-08-23 PROCEDURE — 3008F PR BODY MASS INDEX (BMI) DOCUMENTED: ICD-10-PCS | Mod: HCNC,CPTII,S$GLB, | Performed by: SURGERY

## 2023-08-23 PROCEDURE — 1159F PR MEDICATION LIST DOCUMENTED IN MEDICAL RECORD: ICD-10-PCS | Mod: HCNC,CPTII,S$GLB, | Performed by: SURGERY

## 2023-08-23 PROCEDURE — 99213 OFFICE O/P EST LOW 20 MIN: CPT | Mod: HCNC,S$GLB,, | Performed by: SURGERY

## 2023-08-23 PROCEDURE — 3078F DIAST BP <80 MM HG: CPT | Mod: HCNC,CPTII,S$GLB, | Performed by: SURGERY

## 2023-08-23 PROCEDURE — 99999 PR PBB SHADOW E&M-EST. PATIENT-LVL III: ICD-10-PCS | Mod: PBBFAC,HCNC,, | Performed by: SURGERY

## 2023-08-23 PROCEDURE — 1101F PT FALLS ASSESS-DOCD LE1/YR: CPT | Mod: HCNC,CPTII,S$GLB, | Performed by: SURGERY

## 2023-08-23 PROCEDURE — 3078F PR MOST RECENT DIASTOLIC BLOOD PRESSURE < 80 MM HG: ICD-10-PCS | Mod: HCNC,CPTII,S$GLB, | Performed by: SURGERY

## 2023-08-23 PROCEDURE — 3288F PR FALLS RISK ASSESSMENT DOCUMENTED: ICD-10-PCS | Mod: HCNC,CPTII,S$GLB, | Performed by: SURGERY

## 2023-08-23 PROCEDURE — 99213 PR OFFICE/OUTPT VISIT, EST, LEVL III, 20-29 MIN: ICD-10-PCS | Mod: HCNC,S$GLB,, | Performed by: SURGERY

## 2023-08-23 PROCEDURE — 1101F PR PT FALLS ASSESS DOC 0-1 FALLS W/OUT INJ PAST YR: ICD-10-PCS | Mod: HCNC,CPTII,S$GLB, | Performed by: SURGERY

## 2023-08-23 PROCEDURE — 3288F FALL RISK ASSESSMENT DOCD: CPT | Mod: HCNC,CPTII,S$GLB, | Performed by: SURGERY

## 2023-08-23 PROCEDURE — 1125F AMNT PAIN NOTED PAIN PRSNT: CPT | Mod: HCNC,CPTII,S$GLB, | Performed by: SURGERY

## 2023-08-23 PROCEDURE — 99999 PR PBB SHADOW E&M-EST. PATIENT-LVL III: CPT | Mod: PBBFAC,HCNC,, | Performed by: SURGERY

## 2023-08-23 PROCEDURE — 3008F BODY MASS INDEX DOCD: CPT | Mod: HCNC,CPTII,S$GLB, | Performed by: SURGERY

## 2023-08-23 PROCEDURE — 3074F SYST BP LT 130 MM HG: CPT | Mod: HCNC,CPTII,S$GLB, | Performed by: SURGERY

## 2023-08-23 PROCEDURE — 1160F RVW MEDS BY RX/DR IN RCRD: CPT | Mod: HCNC,CPTII,S$GLB, | Performed by: SURGERY

## 2023-08-23 PROCEDURE — 1159F MED LIST DOCD IN RCRD: CPT | Mod: HCNC,CPTII,S$GLB, | Performed by: SURGERY

## 2023-08-23 PROCEDURE — 1160F PR REVIEW ALL MEDS BY PRESCRIBER/CLIN PHARMACIST DOCUMENTED: ICD-10-PCS | Mod: HCNC,CPTII,S$GLB, | Performed by: SURGERY

## 2023-08-23 PROCEDURE — 1125F PR PAIN SEVERITY QUANTIFIED, PAIN PRESENT: ICD-10-PCS | Mod: HCNC,CPTII,S$GLB, | Performed by: SURGERY

## 2023-08-23 NOTE — PROGRESS NOTES
POST OP VISIT    SUBJECTIVE:  Morris Parada is a 68 y.o. male now s/p laparoscopic heller esophagomyotomy on 4/17/23 who returns to clinic today for follow up visit. He has done well since surgery. He is tolerating a regular diet and having normal bowel function. Incisions are healing appropriately. Denies fevers, chills, nausea, vomiting. His only complaint is a stinging pain around his umbilicus that started around July. The pain started after doing some yardwork/lifting and he felt a sharp stinging pain. The pain is not too severe and he reports has gradually improved over time. He thinks he might have torn or strained a muscle and just wanted to get it checked out.    ROS: Negative except as stated in HPI.     OBJECTIVE:  Physical Exam:  Vitals:    08/23/23 1550   BP: 123/67   Pulse: 76   Temp: 98 °F (36.7 °C)     General: A&O, NAD  Neuro: AAOx3  Cardio: regular rate and rhythm   Resp: no SOB, not in respiratory distress   Abd: Soft, ND, NT  Ext: Warm and well perfused  Incisions: well healing without erythema or drainage    Labs:  Reviewed.     Imaging:  Reviewed.       ASSESSMENT & PLAN:  Morris Parada is a 68 y.o. male s/p laparoscopic heller esophagomyotomy on 4/17/23.    - Encourage regular diet   - Activity: as tolerated   - RTC PRN      Ivan Sanchez MD   [facility]

## 2023-09-07 ENCOUNTER — OFFICE VISIT (OUTPATIENT)
Dept: HEMATOLOGY/ONCOLOGY | Facility: CLINIC | Age: 68
End: 2023-09-07
Payer: MEDICARE

## 2023-09-07 DIAGNOSIS — D53.9 NUTRITIONAL ANEMIA, UNSPECIFIED: ICD-10-CM

## 2023-09-07 DIAGNOSIS — D63.8 CHRONIC DISEASE ANEMIA: ICD-10-CM

## 2023-09-07 DIAGNOSIS — C61 PROSTATE CANCER: Primary | ICD-10-CM

## 2023-09-07 DIAGNOSIS — D64.9 CHRONIC ANEMIA: ICD-10-CM

## 2023-09-07 DIAGNOSIS — D64.9 NORMOCYTIC ANEMIA: ICD-10-CM

## 2023-09-07 PROCEDURE — 1159F PR MEDICATION LIST DOCUMENTED IN MEDICAL RECORD: ICD-10-PCS | Mod: HCNC,CPTII,95, | Performed by: NURSE PRACTITIONER

## 2023-09-07 PROCEDURE — 99214 OFFICE O/P EST MOD 30 MIN: CPT | Mod: HCNC,95,, | Performed by: NURSE PRACTITIONER

## 2023-09-07 PROCEDURE — 1160F PR REVIEW ALL MEDS BY PRESCRIBER/CLIN PHARMACIST DOCUMENTED: ICD-10-PCS | Mod: HCNC,CPTII,95, | Performed by: NURSE PRACTITIONER

## 2023-09-07 PROCEDURE — 1160F RVW MEDS BY RX/DR IN RCRD: CPT | Mod: HCNC,CPTII,95, | Performed by: NURSE PRACTITIONER

## 2023-09-07 PROCEDURE — 99214 PR OFFICE/OUTPT VISIT, EST, LEVL IV, 30-39 MIN: ICD-10-PCS | Mod: HCNC,95,, | Performed by: NURSE PRACTITIONER

## 2023-09-07 PROCEDURE — 1159F MED LIST DOCD IN RCRD: CPT | Mod: HCNC,CPTII,95, | Performed by: NURSE PRACTITIONER

## 2023-09-07 NOTE — PROGRESS NOTES
The patient location is: home  The chief complaint leading to consultation is: lab discussion    Visit type: audiovisual    Face to Face time with patient: 15  30 minutes of total time spent on the encounter, which includes face to face time and non-face to face time preparing to see the patient (eg, review of tests), Obtaining and/or reviewing separately obtained history, Documenting clinical information in the electronic or other health record, Independently interpreting results (not separately reported) and communicating results to the patient/family/caregiver, or Care coordination (not separately reported).     Each patient to whom he or she provides medical services by telemedicine is:  (1) informed of the relationship between the physician and patient and the respective role of any other health care provider with respect to management of the patient; and (2) notified that he or she may decline to receive medical services by telemedicine and may withdraw from such care at any time.      Patient ID: Morris Parada is a 68 y.o. male.    Chief Complaint: anemia workup discussion    HPI:   68 year old male who presents to the hematology department as a new patient for further evaluation and management of chronic anemia.  He has been referred to the hematology department by his PCP, Dr. Miller.       Noted chronic normocytic anemia with hgb ranging from 12-13.8.  Iron studies normal.  No monoclonal proteins.      With elevated psa and prostate cancer proven biopsy Prostate, right base, biopsy 8/16/2022:   - Small focus of prostate adenocarcinoma, Migel grade 3 + 3 = 6, involving   <5% total of 1/2 cores   - Perineural invasion not identified   Being followed by Dr. Sanchez     Had recent esophageal surgery - heller myotomy and had stretched esophagus and is currently having sharp pain in area where there is scar tissue.  He is going to be seeing GI Dr. Kiersten Chauhan soon to discuss.       States that his sister has  sickle cell.       Has occasional glass of wine.  Denies cigarette smoking.  Works as a .      Family hx of cancer:  Self: prostate cancer     Has a history of H. Pylori.  Has a h/o esophageal reflux and now diagnosed with achalasia.        Denies f/c/ns or unintentional weight loss.  Denies any known abnormal lymphadenopathy.      Up to date with age appropriate cancer screenings    Interval History:   9/7/2023 Presents today to discuss workup for chronic anemia.  Is following Dr. Sanchez every 3 months for PSA checks.  States that he does not have any symptoms of anemia.       Social History     Socioeconomic History    Marital status:     Number of children: 3   Occupational History     Employer: [employer]    Occupation:  [employer]   Tobacco Use    Smoking status: Never    Smokeless tobacco: Never   Substance and Sexual Activity    Alcohol use: Yes     Comment: rare    Drug use: No    Sexual activity: Yes     Partners: Female   Social History Narrative    ** Merged History Encounter **          Social Determinants of Health     Financial Resource Strain: Low Risk  (4/17/2023)    Overall Financial Resource Strain (CARDIA)     Difficulty of Paying Living Expenses: Not hard at all   Food Insecurity: No Food Insecurity (4/17/2023)    Hunger Vital Sign     Worried About Running Out of Food in the Last Year: Never true     Ran Out of Food in the Last Year: Never true   Transportation Needs: No Transportation Needs (4/17/2023)    PRAPARE - Transportation     Lack of Transportation (Medical): No     Lack of Transportation (Non-Medical): No   Physical Activity: Sufficiently Active (4/17/2023)    Exercise Vital Sign     Days of Exercise per Week: 5 days     Minutes of Exercise per Session: 30 min   Stress: No Stress Concern Present (4/17/2023)    [place] [employer] - Occupational Stress Questionnaire     Feeling of Stress : Not at all   Social Connections: Unknown (4/17/2023)     Social Connection and Isolation Panel [NHANES]     Frequency of Communication with Friends and Family: More than three times a week     Frequency of Social Gatherings with Friends and Family: Once a week     Active Member of Clubs or Organizations: Yes     Attends Club or Organization Meetings: Never     Marital Status:    Housing Stability: Low Risk  (4/17/2023)    Housing Stability Vital Sign     Unable to Pay for Housing in the Last Year: No     Number of Places Lived in the Last Year: 1     Unstable Housing in the Last Year: No       Family History   Problem Relation Age of Onset    Diabetes Mother     Heart disease Mother     Intracerebral hemorrhage Father     Hyperlipidemia Sister     Hyperlipidemia Sister     Breast cancer Maternal Grandmother     Esophageal cancer Neg Hx     Stomach cancer Neg Hx     Colon cancer Neg Hx     Colon polyps Neg Hx     Rectal cancer Neg Hx     Liver cancer Neg Hx     Pancreatic cancer Neg Hx     Irritable bowel syndrome Neg Hx     Celiac disease Neg Hx     Crohn's disease Neg Hx        Past Surgical History:   Procedure Laterality Date    ESOPHAGEAL DILATION      ESOPHAGEAL MANOMETRY WITH MEASUREMENT OF IMPEDANCE N/A 1/10/2023    Procedure: MANOMETRY-ESOPHAGEAL-WITH IMPEDANCE;  Surgeon: Kiersten Chauhan MD;  Location: [address];  Service: Endoscopy;  Laterality: N/A;    ESOPHAGEAL MOTILITY STUDY USING HIGH RESOLUTION MANOMETRY N/A 7/6/2022    Procedure: MOTILITY STUDY, ESOPHAGUS, USING HIGH RESOLUTION MANOMETRY;  Surgeon: Jaqui Shelton MD;  Location: [facility];  Service: Endoscopy;  Laterality: N/A;    ESOPHAGOGASTRODUODENOSCOPY N/A 07/24/2021    Procedure: EGD (ESOPHAGOGASTRODUODENOSCOPY);  Surgeon: Felipe Briscoe III, MD;  Location: [facility];  Service: Endoscopy;  Laterality: N/A;    ESOPHAGOGASTRODUODENOSCOPY N/A 7/6/2022    Procedure: EGD (ESOPHAGOGASTRODUODENOSCOPY);  Surgeon: Jaqui Shelton MD;  Location: [facility];  Service:  Endoscopy;  Laterality: N/A;    ESOPHAGOGASTRODUODENOSCOPY N/A 1/10/2023    Procedure: ESOPHAGOGASTRODUODENOSCOPY (EGD);  Surgeon: Kiersten Chauhan MD;  Location: [facility] (2ND FLR);  Service: Endoscopy;  Laterality: N/A;  Endoflip/Endoscopically placed manometry probe  2nd floor-End stage achalasia  propofol only  full liquid diet x3 days, clear liquid diet x1 day prior to procedure  instructions [facility] to [facility]    ESOPHAGOGASTRODUODENOSCOPY N/A 4/17/2023    Procedure: EGD (ESOPHAGOGASTRODUODENOSCOPY);  Surgeon: Anabela Chance MD;  Location: [facility] OR [address] [place] [address];  Service: General;  Laterality: N/A;    LAPAROSCOPIC HELLER ESOPHAGEAL MYOTOMY N/A 4/17/2023    Procedure: ESOPHAGOMYOTOMY, LAPAROSCOPIC, HELLER (redo);  Surgeon: Anabela Chance MD;  Location: [facility] OR [address] [place] [address];  Service: General;  Laterality: N/A;    LAPAROSCOPIC LYSIS OF ADHESIONS  4/17/2023    Procedure: LYSIS, ADHESIONS, LAPAROSCOPIC;  Surgeon: Anabela Chance MD;  Location: [facility] OR [address] [place] [address];  Service: General;;    LUNG SURGERY      age 12, partial pneumonectomy       Past Medical History:   Diagnosis Date    Achalasia     Esophageal diverticulum     Helicobacter pylori ab+     Hyperlipidemia     Pityriasis rosea        Review of Systems   Constitutional:  Negative for appetite change and unexpected weight change.   HENT:  Negative for mouth sores.    Eyes:  Negative for visual disturbance.   Respiratory:  Negative for cough and shortness of breath.    Cardiovascular:  Negative for chest pain.   Gastrointestinal:  Negative for abdominal pain and diarrhea.   Endocrine: Negative.    Genitourinary:  Negative for frequency.   Musculoskeletal:  Negative for back pain.   Skin:  Negative for rash.   Allergic/Immunologic: Negative.    Neurological:  Negative for headaches.   Hematological:  Negative for adenopathy.   Psychiatric/Behavioral:  The patient is not nervous/anxious.           Medication List with Changes/Refills    Current Medications    ATORVASTATIN (LIPITOR) 40 MG TABLET    Take 1 tablet (40 mg total) by mouth once daily.    CETIRIZINE (ZYRTEC) 10 MG TABLET    Take 10 mg by mouth once daily.        Objective:   There were no vitals filed for this visit.    Physical Exam  Constitutional:       Appearance: Normal appearance.   Pulmonary:      Effort: Pulmonary effort is normal.   Neurological:      Mental Status: He is alert and oriented to person, place, and time.   Psychiatric:         Mood and Affect: Mood normal.         Behavior: Behavior normal.         Thought Content: Thought content normal.         Judgment: Judgment normal.         Assessment:     Problem List Items Addressed This Visit          Oncology    Prostate cancer - Primary    Relevant Orders    CBC Auto Differential    Comprehensive Metabolic Panel     Other Visit Diagnoses       Chronic anemia        Relevant Orders    CBC Auto Differential    Comprehensive Metabolic Panel    Iron and TIBC    Ferritin    Lactate Dehydrogenase    Folate    Vitamin B12    Normocytic anemia        Relevant Orders    CBC Auto Differential    Comprehensive Metabolic Panel    Iron and TIBC    Ferritin    Lactate Dehydrogenase    Folate    Vitamin B12    Nutritional anemia, unspecified        Relevant Orders    Folate    Vitamin B12    Chronic disease anemia                Lab Results   Component Value Date    WBC 6.47 08/11/2023    RBC 4.74 08/11/2023    HGB 13.0 (L) 08/11/2023    HCT 41.6 08/11/2023    MCV 88 08/11/2023    MCH 27.4 08/11/2023    MCHC 31.3 (L) 08/11/2023    RDW 13.6 08/11/2023     08/11/2023    MPV 9.6 08/11/2023    GRAN 3.7 08/11/2023    GRAN 57.5 08/11/2023    LYMPH 1.8 08/11/2023    LYMPH 27.2 08/11/2023    MONO 0.6 08/11/2023    MONO 9.7 08/11/2023    EOS 0.3 08/11/2023    BASO 0.04 08/11/2023    EOSINOPHIL 4.8 08/11/2023    BASOPHIL 0.6 08/11/2023      Lab Results   Component Value Date     07/25/2023    K 4.2 07/25/2023     07/25/2023  "   CO2 27 07/25/2023    BUN 10 07/25/2023    CREATININE 0.9 07/25/2023    CALCIUM 9.1 07/25/2023    ANIONGAP 10 07/25/2023    ESTGFRAFRICA >60.0 05/02/2022    EGFRNONAA >60.0 05/02/2022     Lab Results   Component Value Date    ALT 27 07/25/2023    AST 22 07/25/2023    ALKPHOS 84 07/25/2023    BILITOT 0.5 07/25/2023     Lab Results   Component Value Date    IRON 79 08/11/2023    TRANSFERRIN 214 08/11/2023    TIBC 317 08/11/2023    FESATURATED 25 08/11/2023      Lab Results   Component Value Date    FERRITIN 172 08/11/2023     PSA, Screen (ng/mL)   Date Value   05/02/2022 5.8 (H)     Lab Results   Component Value Date    AYPLTSVP50 423 09/07/2022     No results found for: "FOLATE"    Plan:   Prostate cancer  -     CBC Auto Differential; Future; Expected date: 09/07/2023  -     Comprehensive Metabolic Panel; Future; Expected date: 09/07/2023    Chronic anemia  -     CBC Auto Differential; Future; Expected date: 09/07/2023  -     Comprehensive Metabolic Panel; Future; Expected date: 09/07/2023  -     Iron and TIBC; Future; Expected date: 09/07/2023  -     Ferritin; Future; Expected date: 09/07/2023  -     Lactate Dehydrogenase; Future; Expected date: 09/07/2023  -     Folate; Future; Expected date: 09/07/2023  -     Vitamin B12; Future; Expected date: 09/07/2023    Normocytic anemia  -     CBC Auto Differential; Future; Expected date: 09/07/2023  -     Comprehensive Metabolic Panel; Future; Expected date: 09/07/2023  -     Iron and TIBC; Future; Expected date: 09/07/2023  -     Ferritin; Future; Expected date: 09/07/2023  -     Lactate Dehydrogenase; Future; Expected date: 09/07/2023  -     Folate; Future; Expected date: 09/07/2023  -     Vitamin B12; Future; Expected date: 09/07/2023    Nutritional anemia, unspecified  -     Folate; Future; Expected date: 09/07/2023  -     Vitamin B12; Future; Expected date: 09/07/2023    Chronic disease anemia    Discussed that hgb for men parameters will be changing soon and will be " hgb 13-18 g/dL) = normal.  He does have slight anemia on occasion. Anemia workup has been unremarkable. I believe anemia is do to slow growing prostate malignancy.  We will continue to monitor every 6 months with labs and will proceed with BMBx for hgb <10 g/dl or additional cytopenia develop without known cause.      Med Onc Chart Routing      Follow up with physician    Follow up with SERGIO 6 months. f/u in 6 months with labs prior - VV after   Infusion scheduling note   n/a   Injection scheduling note n/a   Labs   Scheduling:  Preferred lab: [facility]  Lab interval:  cbc, cmp, iron studies, b12, folate, LDH   Imaging   N/a   Pharmacy appointment No pharmacy appointment needed      Other referrals     No additional referrals needed  n/a            Collaborating Provider:  Dr. Alexander Vasquez    Thank You,  Kymberly Gee, FNP-C  Hematology Oncology

## 2023-09-22 ENCOUNTER — LAB VISIT (OUTPATIENT)
Dept: LAB | Facility: HOSPITAL | Age: 68
End: 2023-09-22
Attending: UROLOGY
Payer: MEDICARE

## 2023-09-22 DIAGNOSIS — C61 PROSTATE CANCER: ICD-10-CM

## 2023-09-22 LAB — COMPLEXED PSA SERPL-MCNC: 6.1 NG/ML (ref 0–4)

## 2023-09-22 PROCEDURE — 36415 COLL VENOUS BLD VENIPUNCTURE: CPT | Mod: HCNC | Performed by: UROLOGY

## 2023-09-22 PROCEDURE — 84153 ASSAY OF PSA TOTAL: CPT | Mod: HCNC | Performed by: UROLOGY

## 2023-09-26 ENCOUNTER — OFFICE VISIT (OUTPATIENT)
Dept: UROLOGY | Facility: CLINIC | Age: 68
End: 2023-09-26
Payer: MEDICARE

## 2023-09-26 VITALS
DIASTOLIC BLOOD PRESSURE: 75 MMHG | WEIGHT: 172 LBS | HEART RATE: 78 BPM | SYSTOLIC BLOOD PRESSURE: 117 MMHG | BODY MASS INDEX: 24.68 KG/M2

## 2023-09-26 DIAGNOSIS — C61 PROSTATE CANCER: Primary | ICD-10-CM

## 2023-09-26 PROCEDURE — 3288F FALL RISK ASSESSMENT DOCD: CPT | Mod: HCNC,CPTII,S$GLB, | Performed by: UROLOGY

## 2023-09-26 PROCEDURE — 99999 PR PBB SHADOW E&M-EST. PATIENT-LVL III: CPT | Mod: PBBFAC,HCNC,, | Performed by: UROLOGY

## 2023-09-26 PROCEDURE — 99999 PR PBB SHADOW E&M-EST. PATIENT-LVL III: ICD-10-PCS | Mod: PBBFAC,HCNC,, | Performed by: UROLOGY

## 2023-09-26 PROCEDURE — 3288F PR FALLS RISK ASSESSMENT DOCUMENTED: ICD-10-PCS | Mod: HCNC,CPTII,S$GLB, | Performed by: UROLOGY

## 2023-09-26 PROCEDURE — 3074F PR MOST RECENT SYSTOLIC BLOOD PRESSURE < 130 MM HG: ICD-10-PCS | Mod: HCNC,CPTII,S$GLB, | Performed by: UROLOGY

## 2023-09-26 PROCEDURE — 99214 OFFICE O/P EST MOD 30 MIN: CPT | Mod: HCNC,S$GLB,, | Performed by: UROLOGY

## 2023-09-26 PROCEDURE — 3008F BODY MASS INDEX DOCD: CPT | Mod: HCNC,CPTII,S$GLB, | Performed by: UROLOGY

## 2023-09-26 PROCEDURE — 1159F MED LIST DOCD IN RCRD: CPT | Mod: HCNC,CPTII,S$GLB, | Performed by: UROLOGY

## 2023-09-26 PROCEDURE — 1126F AMNT PAIN NOTED NONE PRSNT: CPT | Mod: HCNC,CPTII,S$GLB, | Performed by: UROLOGY

## 2023-09-26 PROCEDURE — 3008F PR BODY MASS INDEX (BMI) DOCUMENTED: ICD-10-PCS | Mod: HCNC,CPTII,S$GLB, | Performed by: UROLOGY

## 2023-09-26 PROCEDURE — 3078F DIAST BP <80 MM HG: CPT | Mod: HCNC,CPTII,S$GLB, | Performed by: UROLOGY

## 2023-09-26 PROCEDURE — 1101F PT FALLS ASSESS-DOCD LE1/YR: CPT | Mod: HCNC,CPTII,S$GLB, | Performed by: UROLOGY

## 2023-09-26 PROCEDURE — 1160F RVW MEDS BY RX/DR IN RCRD: CPT | Mod: HCNC,CPTII,S$GLB, | Performed by: UROLOGY

## 2023-09-26 PROCEDURE — 1160F PR REVIEW ALL MEDS BY PRESCRIBER/CLIN PHARMACIST DOCUMENTED: ICD-10-PCS | Mod: HCNC,CPTII,S$GLB, | Performed by: UROLOGY

## 2023-09-26 PROCEDURE — 1126F PR PAIN SEVERITY QUANTIFIED, NO PAIN PRESENT: ICD-10-PCS | Mod: HCNC,CPTII,S$GLB, | Performed by: UROLOGY

## 2023-09-26 PROCEDURE — 3074F SYST BP LT 130 MM HG: CPT | Mod: HCNC,CPTII,S$GLB, | Performed by: UROLOGY

## 2023-09-26 PROCEDURE — 1101F PR PT FALLS ASSESS DOC 0-1 FALLS W/OUT INJ PAST YR: ICD-10-PCS | Mod: HCNC,CPTII,S$GLB, | Performed by: UROLOGY

## 2023-09-26 PROCEDURE — 3078F PR MOST RECENT DIASTOLIC BLOOD PRESSURE < 80 MM HG: ICD-10-PCS | Mod: HCNC,CPTII,S$GLB, | Performed by: UROLOGY

## 2023-09-26 PROCEDURE — 1159F PR MEDICATION LIST DOCUMENTED IN MEDICAL RECORD: ICD-10-PCS | Mod: HCNC,CPTII,S$GLB, | Performed by: UROLOGY

## 2023-09-26 PROCEDURE — 99214 PR OFFICE/OUTPT VISIT, EST, LEVL IV, 30-39 MIN: ICD-10-PCS | Mod: HCNC,S$GLB,, | Performed by: UROLOGY

## 2023-09-26 NOTE — PROGRESS NOTES
Chief Complaint:  Very low risk prostate cancer    HPI:   09/26/2023 - returns today for follow-up, no new issues in the interim, voiding well, PSA 6.1    06/20/2023 - patient returns today for follow-up, no new issues in the interim, voiding well, no further pain after the antibiotics, PSA 6.4    03/10/2023 -  patient returns today for follow-up, no new issues, still has brown ejaculate and intermittent perineal discomfort as well as pain in his penis, thinks he might have an infection, no fevers, overall feeling well, no gross hematuria or dysuria, PSA 5.7    12/09/2022 - returns today for follow-up, no new issues in the interim, PSA down a little bit to 5.2, nocturia x1 and some mild urgency but overall not bothered enough that he wants to try medication, denies any gross hematuria or UTIs    09/09/2022 - patient returns today after biopsy, path showed less than 5 percent Kidder 3 + 3 on right base, MRI negative for concerning lesions, prostate 65 grams, presents today for discussion    06/28/2022 - 67 yo male that presents for elevated PSA.  Patient had routine labs obtained which showed an elevated PSA to 5.8.  This was confirmed a month later at 6.2.  He denies any family history of  cancers.  Denies gross hematuria.  He does have a urinary issues with incomplete emptying, urgency, and weak stream but is not currently on any medications for his prostate.  He notes that he tends to hold his urine for longer than usual due to him being a  and not being able to stop.  Denies any prior urologic procedures.  Also notes ED for the last two years, states that he has less desire than prior.  IPSS - 4/2/4/4/3/1/1 = 19 QOL - 4(mostly dissatisfied)    PMH:  Past Medical History:   Diagnosis Date    Achalasia     Esophageal diverticulum     Helicobacter pylori ab+     Hyperlipidemia     Pityriasis rosea        PSH:  Past Surgical History:   Procedure Laterality Date    ESOPHAGEAL DILATION       ESOPHAGEAL MANOMETRY WITH MEASUREMENT OF IMPEDANCE N/A 1/10/2023    Procedure: MANOMETRY-ESOPHAGEAL-WITH IMPEDANCE;  Surgeon: Kiersten Chauhan MD;  Location: [facility] (2ND FLR);  Service: Endoscopy;  Laterality: N/A;    ESOPHAGEAL MOTILITY STUDY USING HIGH RESOLUTION MANOMETRY N/A 7/6/2022    Procedure: MOTILITY STUDY, ESOPHAGUS, USING HIGH RESOLUTION MANOMETRY;  Surgeon: Jaqui Shelton MD;  Location: [facility];  Service: Endoscopy;  Laterality: N/A;    ESOPHAGOGASTRODUODENOSCOPY N/A 07/24/2021    Procedure: EGD (ESOPHAGOGASTRODUODENOSCOPY);  Surgeon: Felipe Birscoe III, MD;  Location: [facility];  Service: Endoscopy;  Laterality: N/A;    ESOPHAGOGASTRODUODENOSCOPY N/A 7/6/2022    Procedure: EGD (ESOPHAGOGASTRODUODENOSCOPY);  Surgeon: Jaqui Shelton MD;  Location: [facility];  Service: Endoscopy;  Laterality: N/A;    ESOPHAGOGASTRODUODENOSCOPY N/A 1/10/2023    Procedure: ESOPHAGOGASTRODUODENOSCOPY (EGD);  Surgeon: Kiersten Chauhan MD;  Location: [facility] (2ND FLR);  Service: Endoscopy;  Laterality: N/A;  Endoflip/Endoscopically placed manometry probe  2nd floor-End stage achalasia  propofol only  full liquid diet x3 days, clear liquid diet x1 day prior to procedure  instructions [facility] to [facility]    ESOPHAGOGASTRODUODENOSCOPY N/A 4/17/2023    Procedure: EGD (ESOPHAGOGASTRODUODENOSCOPY);  Surgeon: Anabela Chance MD;  [facility];  Service: General;  Laterality: N/A;    LAPAROSCOPIC HELLER ESOPHAGEAL MYOTOMY N/A 4/17/2023    Procedure: ESOPHAGOMYOTOMY, LAPAROSCOPIC, HELLER (redo);  Surgeon: Anabela Chance MD;  Location: [facility] OR 2ND FLR;  Service: General;  Laterality: N/A;    LAPAROSCOPIC LYSIS OF ADHESIONS  4/17/2023    Procedure: LYSIS, ADHESIONS, LAPAROSCOPIC;  Surgeon: Anabela Chance MD;  Location: [facility] OR [address] [place] [address];  Service: General;;    LUNG SURGERY      age 12, partial pneumonectomy       Family History:  Family History   Problem  Relation Age of Onset    Diabetes Mother     Heart disease Mother     Intracerebral hemorrhage Father     Hyperlipidemia Sister     Hyperlipidemia Sister     Breast cancer Maternal Grandmother     Esophageal cancer Neg Hx     Stomach cancer Neg Hx     Colon cancer Neg Hx     Colon polyps Neg Hx     Rectal cancer Neg Hx     Liver cancer Neg Hx     Pancreatic cancer Neg Hx     Irritable bowel syndrome Neg Hx     Celiac disease Neg Hx     Crohn's disease Neg Hx        Social History:  Social History     Tobacco Use    Smoking status: Never    Smokeless tobacco: Never   Substance Use Topics    Alcohol use: Yes     Comment: rare    Drug use: No        Review of Systems:  General: No fever, chills  Skin: No rashes  Chest:  Denies cough and sputum production  Heart: Denies chest pain  Resp: Denies dyspnea  Abdomen: Denies diarrhea, abdominal pain, hematemesis, or blood in stool.  Musculoskeletal: No joint stiffness or swelling. Denies back pain.  : see HPI  Neuro: no dizziness or weakness    Allergies:  Patient has no known allergies.    Medications:    Current Outpatient Medications:     atorvastatin (LIPITOR) 40 MG tablet, Take 1 tablet (40 mg total) by mouth once daily., Disp: 90 tablet, Rfl: 0    cetirizine (ZYRTEC) 10 MG tablet, Take 10 mg by mouth once daily., Disp: , Rfl:     Physical Exam:  Vitals:    09/26/23 1401   BP: 117/75   Pulse: 78     Body mass index is 24.68 kg/m².  General: awake, alert, cooperative  Head: NC/AT  Ears: external ears normal  Eyes: sclera normal  Lungs: normal inspiration, NAD  Heart: well-perfused  Abdomen: Soft, NT, ND   6/22: Normal circ'd phallus, meatus normal in size and position, BL testicles palpable, no masses, nontender, no abnormalities of epididymi  BOO 6/22: Normal rectal tone, no hemorrhoids. Prostate smooth and normal, no nodules 50 gm SV not palpable. Perineum and anus normal.  Lymphatic: groin nodes negative  Skin: The skin is warm and dry  Ext: No c/c/e.  Neuro:  grossly intact, AOx3    RADIOLOGY:  No recent relevant imaging available for review.    LABS:  I personally reviewed the following lab values:  Lab Results   Component Value Date    WBC 6.47 08/11/2023    HGB 13.0 (L) 08/11/2023    HCT 41.6 08/11/2023     08/11/2023     07/25/2023    K 4.2 07/25/2023     07/25/2023    CREATININE 0.9 07/25/2023    BUN 10 07/25/2023    CO2 27 07/25/2023    TSH 0.984 08/17/2023    PSA 5.8 (H) 05/02/2022    CHOL 161 07/25/2023    TRIG 73 07/25/2023    HDL 60 07/25/2023    ALT 27 07/25/2023    AST 22 07/25/2023       Assessment/Plan:   Morris Parada is a 68 y.o. male with:    Very low risk prostate cancer - PSA stable, follow-up three months with PSA, will plan for repeat MRI in 6 months    ?Prostatitis - cleared with doxycycline x 30 days    BPH - not interested in medications currently    ED - PRN edwar Sanchez MD  Urology

## 2023-12-28 ENCOUNTER — LAB VISIT (OUTPATIENT)
Dept: LAB | Facility: HOSPITAL | Age: 68
End: 2023-12-28
Attending: UROLOGY
Payer: MEDICARE

## 2023-12-28 DIAGNOSIS — C61 PROSTATE CANCER: ICD-10-CM

## 2023-12-28 LAB — COMPLEXED PSA SERPL-MCNC: 6.2 NG/ML (ref 0–4)

## 2023-12-28 PROCEDURE — 36415 COLL VENOUS BLD VENIPUNCTURE: CPT | Mod: HCNC | Performed by: UROLOGY

## 2023-12-28 PROCEDURE — 84153 ASSAY OF PSA TOTAL: CPT | Mod: HCNC | Performed by: UROLOGY

## 2024-01-02 ENCOUNTER — TELEPHONE (OUTPATIENT)
Dept: UROLOGY | Facility: CLINIC | Age: 69
End: 2024-01-02

## 2024-01-02 ENCOUNTER — OFFICE VISIT (OUTPATIENT)
Dept: UROLOGY | Facility: CLINIC | Age: 69
End: 2024-01-02
Payer: MEDICARE

## 2024-01-02 VITALS
SYSTOLIC BLOOD PRESSURE: 120 MMHG | HEIGHT: 70 IN | DIASTOLIC BLOOD PRESSURE: 60 MMHG | BODY MASS INDEX: 25.41 KG/M2 | WEIGHT: 177.5 LBS

## 2024-01-02 DIAGNOSIS — R97.20 INCREASED PROSTATE SPECIFIC ANTIGEN (PSA) VELOCITY: Primary | ICD-10-CM

## 2024-01-02 DIAGNOSIS — C61 PROSTATE CANCER: Primary | ICD-10-CM

## 2024-01-02 PROCEDURE — 1160F RVW MEDS BY RX/DR IN RCRD: CPT | Mod: HCNC,CPTII,S$GLB, | Performed by: UROLOGY

## 2024-01-02 PROCEDURE — 3008F BODY MASS INDEX DOCD: CPT | Mod: HCNC,CPTII,S$GLB, | Performed by: UROLOGY

## 2024-01-02 PROCEDURE — 3288F FALL RISK ASSESSMENT DOCD: CPT | Mod: HCNC,CPTII,S$GLB, | Performed by: UROLOGY

## 2024-01-02 PROCEDURE — 1101F PT FALLS ASSESS-DOCD LE1/YR: CPT | Mod: HCNC,CPTII,S$GLB, | Performed by: UROLOGY

## 2024-01-02 PROCEDURE — 99999 PR PBB SHADOW E&M-EST. PATIENT-LVL III: CPT | Mod: PBBFAC,HCNC,, | Performed by: UROLOGY

## 2024-01-02 PROCEDURE — 3074F SYST BP LT 130 MM HG: CPT | Mod: HCNC,CPTII,S$GLB, | Performed by: UROLOGY

## 2024-01-02 PROCEDURE — 3078F DIAST BP <80 MM HG: CPT | Mod: HCNC,CPTII,S$GLB, | Performed by: UROLOGY

## 2024-01-02 PROCEDURE — 1159F MED LIST DOCD IN RCRD: CPT | Mod: HCNC,CPTII,S$GLB, | Performed by: UROLOGY

## 2024-01-02 PROCEDURE — 99214 OFFICE O/P EST MOD 30 MIN: CPT | Mod: HCNC,S$GLB,, | Performed by: UROLOGY

## 2024-01-02 PROCEDURE — 1126F AMNT PAIN NOTED NONE PRSNT: CPT | Mod: HCNC,CPTII,S$GLB, | Performed by: UROLOGY

## 2024-01-02 NOTE — PROGRESS NOTES
Chief Complaint:  Very low risk prostate cancer    HPI:   01/02/2024 - returns today for follow-up, no new issues in the interim, PSA stable at 6.2, notes that his granddaughter in [place] passed away from RSV two weeks before Katina    09/26/2023 - returns today for follow-up, no new issues in the interim, voiding well, PSA 6.1    06/20/2023 - patient returns today for follow-up, no new issues in the interim, voiding well, no further pain after the antibiotics, PSA 6.4    03/10/2023 -  patient returns today for follow-up, no new issues, still has brown ejaculate and intermittent perineal discomfort as well as pain in his penis, thinks he might have an infection, no fevers, overall feeling well, no gross hematuria or dysuria, PSA 5.7    12/09/2022 - returns today for follow-up, no new issues in the interim, PSA down a little bit to 5.2, nocturia x1 and some mild urgency but overall not bothered enough that he wants to try medication, denies any gross hematuria or UTIs    09/09/2022 - patient returns today after biopsy, path showed less than 5 percent [place] 3 + 3 on right base, MRI negative for concerning lesions, prostate 65 grams, presents today for discussion    06/28/2022 - 65 yo male that presents for elevated PSA.  Patient had routine labs obtained which showed an elevated PSA to 5.8.  This was confirmed a month later at 6.2.  He denies any family history of  cancers.  Denies gross hematuria.  He does have a urinary issues with incomplete emptying, urgency, and weak stream but is not currently on any medications for his prostate.  He notes that he tends to hold his urine for longer than usual due to him being a  and not being able to stop.  Denies any prior urologic procedures.  Also notes ED for the last two years, states that he has less desire than prior.  IPSS - 4/2/4/4/3/1/1 = 19 QOL - 4(mostly dissatisfied)    PMH:  Past Medical History:   Diagnosis Date    Achalasia      Esophageal diverticulum     Helicobacter pylori ab+     Hyperlipidemia     Pityriasis rosea        PSH:  Past Surgical History:   Procedure Laterality Date    ESOPHAGEAL DILATION      ESOPHAGEAL MANOMETRY WITH MEASUREMENT OF IMPEDANCE N/A 1/10/2023    Procedure: MANOMETRY-ESOPHAGEAL-WITH IMPEDANCE;  Surgeon: Kiersten Chauhan MD;  Location: [facility] (2ND FLR);  Service: Endoscopy;  Laterality: N/A;    ESOPHAGEAL MOTILITY STUDY USING HIGH RESOLUTION MANOMETRY N/A 7/6/2022    Procedure: MOTILITY STUDY, ESOPHAGUS, USING HIGH RESOLUTION MANOMETRY;  Surgeon: Jaqui Shelton MD;  [facility];  Service: Endoscopy;  Laterality: N/A;    ESOPHAGOGASTRODUODENOSCOPY N/A 07/24/2021    Procedure: EGD (ESOPHAGOGASTRODUODENOSCOPY);  Surgeon: Felipe Briscoe III, MD;  Location: [facility];  Service: Endoscopy;  Laterality: N/A;    ESOPHAGOGASTRODUODENOSCOPY N/A 7/6/2022    Procedure: EGD (ESOPHAGOGASTRODUODENOSCOPY);  Surgeon: Jaqui Shelton MD;  Location: [facility];  Service: Endoscopy;  Laterality: N/A;    ESOPHAGOGASTRODUODENOSCOPY N/A 1/10/2023    Procedure: ESOPHAGOGASTRODUODENOSCOPY (EGD);  Surgeon: Kiersten Chauhan MD;  Location: [facility] (2ND FLR);  Service: Endoscopy;  Laterality: N/A;  Endoflip/Endoscopically placed manometry probe  2nd floor-End stage achalasia  propofol only  full liquid diet x3 days, clear liquid diet x1 day prior to procedure  instructions sent to [facility]    ESOPHAGOGASTRODUODENOSCOPY N/A 4/17/2023    Procedure: EGD (ESOPHAGOGASTRODUODENOSCOPY);  Surgeon: Anabela Chance MD;  Location: [facility] OR 2ND FLR;  Service: General;  Laterality: N/A;    LAPAROSCOPIC HELLER ESOPHAGEAL MYOTOMY N/A 4/17/2023    Procedure: ESOPHAGOMYOTOMY, LAPAROSCOPIC, HELLER (redo);  Surgeon: Anabela Chance MD;  Location: [facility] OR 2ND FLR;  Service: General;  Laterality: N/A;    LAPAROSCOPIC LYSIS OF ADHESIONS  4/17/2023    Procedure: LYSIS, ADHESIONS, LAPAROSCOPIC;  Surgeon:  Anabela Chance MD;  Location: [facility] OR [address] [place] [address];  Service: General;;    LUNG SURGERY      age 12, partial pneumonectomy       Family History:  Family History   Problem Relation Age of Onset    Diabetes Mother     Heart disease Mother     Intracerebral hemorrhage Father     Hyperlipidemia Sister     Hyperlipidemia Sister     Breast cancer Maternal Grandmother     Esophageal cancer Neg Hx     Stomach cancer Neg Hx     Colon cancer Neg Hx     Colon polyps Neg Hx     Rectal cancer Neg Hx     Liver cancer Neg Hx     Pancreatic cancer Neg Hx     Irritable bowel syndrome Neg Hx     Celiac disease Neg Hx     Crohn's disease Neg Hx        Social History:  Social History     Tobacco Use    Smoking status: Never    Smokeless tobacco: Never   Substance Use Topics    Alcohol use: Yes     Comment: rare    Drug use: No        Review of Systems:  General: No fever, chills  Skin: No rashes  Chest:  Denies cough and sputum production  Heart: Denies chest pain  Resp: Denies dyspnea  Abdomen: Denies diarrhea, abdominal pain, hematemesis, or blood in stool.  Musculoskeletal: No joint stiffness or swelling. Denies back pain.  : see HPI  Neuro: no dizziness or weakness    Allergies:  Patient has no known allergies.    Medications:    Current Outpatient Medications:     atorvastatin (LIPITOR) 40 MG tablet, Take 1 tablet (40 mg total) by mouth once daily., Disp: 90 tablet, Rfl: 3    cetirizine (ZYRTEC) 10 MG tablet, Take 10 mg by mouth once daily., Disp: , Rfl:     Physical Exam:  Vitals:    01/02/24 1345   BP: 120/60     Body mass index is 25.46 kg/m².  General: awake, alert, cooperative  Head: NC/AT  Ears: external ears normal  Eyes: sclera normal  Lungs: normal inspiration, NAD  Heart: well-perfused  Abdomen: Soft, NT, ND   6/22: Normal circ'd phallus, meatus normal in size and position, BL testicles palpable, no masses, nontender, no abnormalities of epididymi  BOO 6/22: Normal rectal tone, no hemorrhoids. Prostate smooth  and normal, no nodules 50 gm SV not palpable. Perineum and anus normal.  Lymphatic: groin nodes negative  Skin: The skin is warm and dry  Ext: No c/c/e.  Neuro: grossly intact, AOx3    RADIOLOGY:  No recent relevant imaging available for review.    LABS:  I personally reviewed the following lab values:  Lab Results   Component Value Date    WBC 6.47 08/11/2023    HGB 13.0 (L) 08/11/2023    HCT 41.6 08/11/2023     08/11/2023     07/25/2023    K 4.2 07/25/2023     07/25/2023    CREATININE 0.9 07/25/2023    BUN 10 07/25/2023    CO2 27 07/25/2023    TSH 0.984 08/17/2023    PSA 5.8 (H) 05/02/2022    CHOL 161 07/25/2023    TRIG 73 07/25/2023    HDL 60 07/25/2023    ALT 27 07/25/2023    AST 22 07/25/2023       Assessment/Plan:   Morris Parada is a 68 y.o. male with:    Very low risk prostate cancer - PSA stable, repeat MRI, f/u 4 months with PSA    ?Prostatitis - cleared with doxycycline x 30 days    BPH - not interested in medications currently    ED - PRN vialuis alfredo Sanchez MD  Urology

## 2024-01-31 ENCOUNTER — HOSPITAL ENCOUNTER (OUTPATIENT)
Dept: RADIOLOGY | Facility: HOSPITAL | Age: 69
Discharge: HOME | End: 2024-01-31
Attending: UROLOGY
Payer: MEDICARE

## 2024-01-31 DIAGNOSIS — C61 PROSTATE CANCER: ICD-10-CM

## 2024-01-31 PROCEDURE — 72197 MRI PELVIS W/O & W/DYE: CPT | Mod: 26,HCNC,, | Performed by: RADIOLOGY

## 2024-01-31 PROCEDURE — A9585 GADOBUTROL INJECTION: HCPCS | Mod: HCNC | Performed by: UROLOGY

## 2024-01-31 PROCEDURE — 72197 MRI PELVIS W/O & W/DYE: CPT | Mod: TC,HCNC

## 2024-01-31 PROCEDURE — 25500020 PHARM REV CODE 255: Mod: HCNC | Performed by: UROLOGY

## 2024-01-31 RX ORDER — GADOBUTROL 604.72 MG/ML
8.2 INJECTION INTRAVENOUS
Status: COMPLETED | OUTPATIENT
Start: 2024-01-31 | End: 2024-01-31

## 2024-01-31 RX ADMIN — GADOBUTROL 8.2 ML: 604.72 INJECTION INTRAVENOUS at 03:01

## 2024-03-18 ENCOUNTER — LAB VISIT (OUTPATIENT)
Dept: LAB | Facility: HOSPITAL | Age: 69
End: 2024-03-18
Attending: NURSE PRACTITIONER
Payer: MEDICARE

## 2024-03-18 DIAGNOSIS — C61 PROSTATE CANCER: ICD-10-CM

## 2024-03-18 DIAGNOSIS — D53.9 NUTRITIONAL ANEMIA, UNSPECIFIED: ICD-10-CM

## 2024-03-18 DIAGNOSIS — D64.9 CHRONIC ANEMIA: ICD-10-CM

## 2024-03-18 DIAGNOSIS — D64.9 NORMOCYTIC ANEMIA: ICD-10-CM

## 2024-03-18 LAB
ALBUMIN SERPL BCP-MCNC: 3.8 G/DL (ref 3.5–5.2)
ALP SERPL-CCNC: 90 U/L (ref 55–135)
ALT SERPL W/O P-5'-P-CCNC: 21 U/L (ref 10–44)
ANION GAP SERPL CALC-SCNC: 5 MMOL/L (ref 8–16)
AST SERPL-CCNC: 21 U/L (ref 10–40)
BASOPHILS # BLD AUTO: 0.05 K/UL (ref 0–0.2)
BASOPHILS NFR BLD: 0.9 % (ref 0–1.9)
BILIRUB SERPL-MCNC: 0.5 MG/DL (ref 0.1–1)
BUN SERPL-MCNC: 10 MG/DL (ref 8–23)
CALCIUM SERPL-MCNC: 8.9 MG/DL (ref 8.7–10.5)
CHLORIDE SERPL-SCNC: 105 MMOL/L (ref 95–110)
CO2 SERPL-SCNC: 29 MMOL/L (ref 23–29)
CREAT SERPL-MCNC: 0.9 MG/DL (ref 0.5–1.4)
DIFFERENTIAL METHOD BLD: ABNORMAL
EOSINOPHIL # BLD AUTO: 0.4 K/UL (ref 0–0.5)
EOSINOPHIL NFR BLD: 6.6 % (ref 0–8)
ERYTHROCYTE [DISTWIDTH] IN BLOOD BY AUTOMATED COUNT: 13.4 % (ref 11.5–14.5)
EST. GFR  (NO RACE VARIABLE): >60 ML/MIN/1.73 M^2
FERRITIN SERPL-MCNC: 165 NG/ML (ref 20–300)
FOLATE SERPL-MCNC: 9.5 NG/ML (ref 4–24)
GLUCOSE SERPL-MCNC: 103 MG/DL (ref 70–110)
HCT VFR BLD AUTO: 41.1 % (ref 40–54)
HGB BLD-MCNC: 13.2 G/DL (ref 14–18)
IMM GRANULOCYTES # BLD AUTO: 0.01 K/UL (ref 0–0.04)
IMM GRANULOCYTES NFR BLD AUTO: 0.2 % (ref 0–0.5)
IRON SERPL-MCNC: 80 UG/DL (ref 45–160)
LDH SERPL L TO P-CCNC: 203 U/L (ref 110–260)
LYMPHOCYTES # BLD AUTO: 1.6 K/UL (ref 1–4.8)
LYMPHOCYTES NFR BLD: 27.8 % (ref 18–48)
MCH RBC QN AUTO: 28.2 PG (ref 27–31)
MCHC RBC AUTO-ENTMCNC: 32.1 G/DL (ref 32–36)
MCV RBC AUTO: 88 FL (ref 82–98)
MONOCYTES # BLD AUTO: 0.4 K/UL (ref 0.3–1)
MONOCYTES NFR BLD: 7.8 % (ref 4–15)
NEUTROPHILS # BLD AUTO: 3.2 K/UL (ref 1.8–7.7)
NEUTROPHILS NFR BLD: 56.7 % (ref 38–73)
NRBC BLD-RTO: 0 /100 WBC
PLATELET # BLD AUTO: 172 K/UL (ref 150–450)
PMV BLD AUTO: 8.6 FL (ref 9.2–12.9)
POTASSIUM SERPL-SCNC: 4.2 MMOL/L (ref 3.5–5.1)
PROT SERPL-MCNC: 7.1 G/DL (ref 6–8.4)
RBC # BLD AUTO: 4.68 M/UL (ref 4.6–6.2)
SATURATED IRON: 26 % (ref 20–50)
SODIUM SERPL-SCNC: 139 MMOL/L (ref 136–145)
TOTAL IRON BINDING CAPACITY: 303 UG/DL (ref 250–450)
TRANSFERRIN SERPL-MCNC: 205 MG/DL (ref 200–375)
VIT B12 SERPL-MCNC: 468 PG/ML (ref 210–950)
WBC # BLD AUTO: 5.64 K/UL (ref 3.9–12.7)

## 2024-03-18 PROCEDURE — 83540 ASSAY OF IRON: CPT | Mod: HCNC | Performed by: NURSE PRACTITIONER

## 2024-03-18 PROCEDURE — 85025 COMPLETE CBC W/AUTO DIFF WBC: CPT | Mod: HCNC | Performed by: NURSE PRACTITIONER

## 2024-03-18 PROCEDURE — 83615 LACTATE (LD) (LDH) ENZYME: CPT | Mod: HCNC | Performed by: NURSE PRACTITIONER

## 2024-03-18 PROCEDURE — 82746 ASSAY OF FOLIC ACID SERUM: CPT | Mod: HCNC | Performed by: NURSE PRACTITIONER

## 2024-03-18 PROCEDURE — 82607 VITAMIN B-12: CPT | Mod: HCNC | Performed by: NURSE PRACTITIONER

## 2024-03-18 PROCEDURE — 82728 ASSAY OF FERRITIN: CPT | Mod: HCNC | Performed by: NURSE PRACTITIONER

## 2024-03-18 PROCEDURE — 36415 COLL VENOUS BLD VENIPUNCTURE: CPT | Mod: HCNC | Performed by: NURSE PRACTITIONER

## 2024-03-18 PROCEDURE — 80053 COMPREHEN METABOLIC PANEL: CPT | Mod: HCNC | Performed by: NURSE PRACTITIONER

## 2024-03-25 ENCOUNTER — OFFICE VISIT (OUTPATIENT)
Dept: HEMATOLOGY/ONCOLOGY | Facility: CLINIC | Age: 69
End: 2024-03-25
Payer: MEDICARE

## 2024-03-25 DIAGNOSIS — C61 PROSTATE CANCER: Primary | ICD-10-CM

## 2024-03-25 DIAGNOSIS — D63.8 CHRONIC DISEASE ANEMIA: ICD-10-CM

## 2024-03-25 PROCEDURE — 99214 OFFICE O/P EST MOD 30 MIN: CPT | Mod: HCNC,95,, | Performed by: NURSE PRACTITIONER

## 2024-03-25 PROCEDURE — 1159F MED LIST DOCD IN RCRD: CPT | Mod: HCNC,CPTII,95, | Performed by: NURSE PRACTITIONER

## 2024-03-25 PROCEDURE — 1160F RVW MEDS BY RX/DR IN RCRD: CPT | Mod: HCNC,CPTII,95, | Performed by: NURSE PRACTITIONER

## 2024-03-25 NOTE — PROGRESS NOTES
The patient location is: home  The chief complaint leading to consultation is: none    Visit type: audiovisual    Face to Face time with patient: 15  30 minutes of total time spent on the encounter, which includes face to face time and non-face to face time preparing to see the patient (eg, review of tests), Obtaining and/or reviewing separately obtained history, Documenting clinical information in the electronic or other health record, Independently interpreting results (not separately reported) and communicating results to the patient/family/caregiver, or Care coordination (not separately reported).         Each patient to whom he or she provides medical services by telemedicine is:  (1) informed of the relationship between the physician and patient and the respective role of any other health care provider with respect to management of the patient; and (2) notified that he or she may decline to receive medical services by telemedicine and may withdraw from such care at any time.      Patient ID: Morris Parada is a 68 y.o. male.    Chief Complaint: no complaints    HPI:   68 year old male who presents to the hematology department as a new patient for further evaluation and management of chronic anemia.  He has been referred to the hematology department by his PCP, Dr. Miller.       Noted chronic normocytic anemia with hgb ranging from 12-13.8.  Iron studies normal.  No monoclonal proteins.      With elevated psa and prostate cancer proven biopsy Prostate, right base, biopsy 8/16/2022:   - Small focus of prostate adenocarcinoma, [place] grade 3 + 3 = 6, involving   <5% total of 1/2 cores   - Perineural invasion not identified   Being followed by Dr. Sanchez     Had recent esophageal surgery - heller myotomy and had stretched esophagus and is currently having sharp pain in area where there is scar tissue.  He is going to be seeing GI Dr. Kiersten Chauhan soon to discuss.       States that his sister has sickle cell.        Has occasional glass of wine.  Denies cigarette smoking.  Works as a .      Family hx of cancer:  Self: prostate cancer     Has a history of H. Pylori.  Has a h/o esophageal reflux and now diagnosed with achalasia.        Denies f/c/ns or unintentional weight loss.  Denies any known abnormal lymphadenopathy.      Up to date with age appropriate cancer screenings     Interval History:   9/7/2023 Presents today to discuss workup for chronic anemia.  Is following Dr. Sanchez every 3 months for PSA checks.  States that he does not have any symptoms of anemia.      Interval History:  3/25/2024 Presents today to review labs with chronic anemia and know prostate cancer with elevated psa.  Hgb stable at 13.2 normocytic.  Negative alph, beta,thalassemia or sickle cell trait.  No iron deficiency or B12 deficiency     I have reviewed all of the patient's relevant lab work available in the medical record and have utilized this in my evaluation and management recommendations today.      Social History     Socioeconomic History    Marital status:     Number of children: 3   Occupational History     Employer: [employer]    Occupation:  [employer]   Tobacco Use    Smoking status: Never    Smokeless tobacco: Never   Substance and Sexual Activity    Alcohol use: Yes     Comment: rare    Drug use: No    Sexual activity: Yes     Partners: Female   Social History Narrative    ** Merged History Encounter **          Social Determinants of Health     Financial Resource Strain: Low Risk  (4/17/2023)    Overall Financial Resource Strain (CARDIA)     Difficulty of Paying Living Expenses: Not hard at all   Food Insecurity: No Food Insecurity (4/17/2023)    Hunger Vital Sign     Worried About Running Out of Food in the Last Year: Never true     Ran Out of Food in the Last Year: Never true   Transportation Needs: No Transportation Needs (4/17/2023)    PRAPARE - Transportation     Lack of Transportation (Medical): No      Lack of Transportation (Non-Medical): No   Physical Activity: Sufficiently Active (4/17/2023)    Exercise Vital Sign     Days of Exercise per Week: 5 days     Minutes of Exercise per Session: 30 min   Stress: No Stress Concern Present (4/17/2023)    [employer] [place] [employer] - Occupational Stress Questionnaire     Feeling of Stress : Not at all   Social Connections: Unknown (4/17/2023)    Social Connection and Isolation Panel [NHANES]     Frequency of Communication with Friends and Family: More than three times a week     Frequency of Social Gatherings with Friends and Family: Once a week     Active Member of Clubs or Organizations: Yes     Attends Club or Organization Meetings: Never     Marital Status:    Housing Stability: Low Risk  (4/17/2023)    Housing Stability Vital Sign     Unable to Pay for Housing in the Last Year: No     Number of Places Lived in the Last Year: 1     Unstable Housing in the Last Year: No       Family History   Problem Relation Age of Onset    Diabetes Mother     Heart disease Mother     Intracerebral hemorrhage Father     Hyperlipidemia Sister     Hyperlipidemia Sister     Breast cancer Maternal Grandmother     Esophageal cancer Neg Hx     Stomach cancer Neg Hx     Colon cancer Neg Hx     Colon polyps Neg Hx     Rectal cancer Neg Hx     Liver cancer Neg Hx     Pancreatic cancer Neg Hx     Irritable bowel syndrome Neg Hx     Celiac disease Neg Hx     Crohn's disease Neg Hx        Past Surgical History:   Procedure Laterality Date    ESOPHAGEAL DILATION      ESOPHAGEAL MANOMETRY WITH MEASUREMENT OF IMPEDANCE N/A 1/10/2023    Procedure: MANOMETRY-ESOPHAGEAL-WITH IMPEDANCE;  Surgeon: Kiersten Chauhan MD;  Location: [address];  Service: Endoscopy;  Laterality: N/A;    ESOPHAGEAL MOTILITY STUDY USING HIGH RESOLUTION MANOMETRY N/A 7/6/2022    Procedure: MOTILITY STUDY, ESOPHAGUS, USING HIGH RESOLUTION MANOMETRY;  Surgeon: Jaqui Shelton MD;  Location:  [facility] ENDO;  Service: Endoscopy;  Laterality: N/A;    ESOPHAGOGASTRODUODENOSCOPY N/A 07/24/2021    Procedure: EGD (ESOPHAGOGASTRODUODENOSCOPY);  Surgeon: Felipe Briscoe III, MD;  Location: [facility];  Service: Endoscopy;  Laterality: N/A;    ESOPHAGOGASTRODUODENOSCOPY N/A 7/6/2022    Procedure: EGD (ESOPHAGOGASTRODUODENOSCOPY);  Surgeon: Jaqui Shelton MD;  Location: [facility];  Service: Endoscopy;  Laterality: N/A;    ESOPHAGOGASTRODUODENOSCOPY N/A 1/10/2023    Procedure: ESOPHAGOGASTRODUODENOSCOPY (EGD);  Surgeon: Kiersten Chauhan MD;  Location: [facility] (2ND FLR);  Service: Endoscopy;  Laterality: N/A;  Endoflip/Endoscopically placed manometry probe  2nd floor-End stage achalasia  propofol only  full liquid diet x3 days, clear liquid diet x1 day prior to procedure  instructions sent to [facility]    ESOPHAGOGASTRODUODENOSCOPY N/A 4/17/2023    Procedure: EGD (ESOPHAGOGASTRODUODENOSCOPY);  Surgeon: Anabela Chance MD;  Location: [address];  Service: General;  Laterality: N/A;    LAPAROSCOPIC HELLER ESOPHAGEAL MYOTOMY N/A 4/17/2023    Procedure: ESOPHAGOMYOTOMY, LAPAROSCOPIC, HELLER (redo);  Surgeon: Anabela Chance MD;  Location: [address];  Service: General;  Laterality: N/A;    LAPAROSCOPIC LYSIS OF ADHESIONS  4/17/2023    Procedure: LYSIS, ADHESIONS, LAPAROSCOPIC;  Surgeon: Anabela Chance MD;  [facility] OR [address] [place] [address];  Service: General;;    LUNG SURGERY      age 12, partial pneumonectomy       Past Medical History:   Diagnosis Date    Achalasia     Esophageal diverticulum     Helicobacter pylori ab+     Hyperlipidemia     Pityriasis rosea        Review of Systems   Constitutional: Negative.    HENT: Negative.     Eyes: Negative.    Respiratory: Negative.     Cardiovascular: Negative.    Gastrointestinal: Negative.    Endocrine: Negative.    Genitourinary: Negative.    Musculoskeletal: Negative.    Skin: Negative.    Allergic/Immunologic: Negative.     Neurological: Negative.    Hematological: Negative.    Psychiatric/Behavioral: Negative.            Medication List with Changes/Refills   Current Medications    ATORVASTATIN (LIPITOR) 40 MG TABLET    Take 1 tablet (40 mg total) by mouth once daily.    CETIRIZINE (ZYRTEC) 10 MG TABLET    Take 10 mg by mouth once daily.        Objective:   There were no vitals filed for this visit.    Physical Exam  Constitutional:       Appearance: Normal appearance.   Pulmonary:      Effort: Pulmonary effort is normal.   Neurological:      Mental Status: He is alert and oriented to person, place, and time.   Psychiatric:         Mood and Affect: Mood normal.         Behavior: Behavior normal.         Thought Content: Thought content normal.         Judgment: Judgment normal.         Assessment:     Problem List Items Addressed This Visit          Oncology    Prostate cancer - Primary    Relevant Orders    CBC Auto Differential    Comprehensive Metabolic Panel     Other Visit Diagnoses       Chronic disease anemia        Relevant Orders    CBC Auto Differential    Comprehensive Metabolic Panel            Lab Results   Component Value Date    WBC 5.64 03/18/2024    RBC 4.68 03/18/2024    HGB 13.2 (L) 03/18/2024    HCT 41.1 03/18/2024    MCV 88 03/18/2024    MCH 28.2 03/18/2024    MCHC 32.1 03/18/2024    RDW 13.4 03/18/2024     03/18/2024    MPV 8.6 (L) 03/18/2024    GRAN 3.2 03/18/2024    GRAN 56.7 03/18/2024    LYMPH 1.6 03/18/2024    LYMPH 27.8 03/18/2024    MONO 0.4 03/18/2024    MONO 7.8 03/18/2024    EOS 0.4 03/18/2024    BASO 0.05 03/18/2024    EOSINOPHIL 6.6 03/18/2024    BASOPHIL 0.9 03/18/2024      Lab Results   Component Value Date     03/18/2024    K 4.2 03/18/2024     03/18/2024    CO2 29 03/18/2024    BUN 10 03/18/2024    CREATININE 0.9 03/18/2024    CALCIUM 8.9 03/18/2024    ANIONGAP 5 (L) 03/18/2024    ESTGFRAFRICA >60.0 05/02/2022    EGFRNONAA >60.0 05/02/2022     Lab Results   Component Value  Date    ALT 21 03/18/2024    AST 21 03/18/2024    ALKPHOS 90 03/18/2024    BILITOT 0.5 03/18/2024     Lab Results   Component Value Date    IRON 80 03/18/2024    TRANSFERRIN 205 03/18/2024    TIBC 303 03/18/2024    FESATURATED 26 03/18/2024    FERRITIN 165 03/18/2024        Plan:   Prostate cancer  -     CBC Auto Differential; Future; Expected date: 03/25/2024  -     Comprehensive Metabolic Panel; Future; Expected date: 03/25/2024    Chronic disease anemia  -     CBC Auto Differential; Future; Expected date: 03/25/2024  -     Comprehensive Metabolic Panel; Future; Expected date: 03/25/2024        Med Onc Chart Routing      Follow up with physician    Follow up with SERGIO . F/u in 12/2024 with labs prior - VV   Infusion scheduling note   n/a   Injection scheduling note n/a   Labs   Scheduling:  Preferred lab:  Lab interval:  Cbc anc cmp prior to next visit   Imaging   N/a   Pharmacy appointment No pharmacy appointment needed      Other referrals       No additional referrals needed  n/a            Anemia has been very stable with no worsening anemia or additional cytopenias.  Will f/u in 12/2024 with repeat labs for continued  evaluation of labs to assess for worsening anemia or additional cytopenia.  Continue f/u with Dr. Sanchez for prostate cancer.     Collaborating Provider:  Dr. Alexander Vasquez    Thank You,  Kymberly Gee, FNP-C  Benign Hematology

## 2024-05-17 ENCOUNTER — TELEPHONE (OUTPATIENT)
Dept: INTERNAL MEDICINE | Facility: CLINIC | Age: 69
End: 2024-05-17
Payer: MEDICARE

## 2024-05-17 DIAGNOSIS — Z12.11 COLON CANCER SCREENING: Primary | ICD-10-CM

## 2024-07-09 ENCOUNTER — LAB VISIT (OUTPATIENT)
Dept: LAB | Facility: HOSPITAL | Age: 69
End: 2024-07-09
Attending: UROLOGY
Payer: MEDICARE

## 2024-07-09 ENCOUNTER — OFFICE VISIT (OUTPATIENT)
Dept: UROLOGY | Facility: CLINIC | Age: 69
End: 2024-07-09
Payer: MEDICARE

## 2024-07-09 VITALS
HEART RATE: 77 BPM | BODY MASS INDEX: 25 KG/M2 | DIASTOLIC BLOOD PRESSURE: 81 MMHG | HEIGHT: 70 IN | SYSTOLIC BLOOD PRESSURE: 118 MMHG | WEIGHT: 174.63 LBS

## 2024-07-09 DIAGNOSIS — C61 PROSTATE CANCER: Primary | ICD-10-CM

## 2024-07-09 DIAGNOSIS — C61 PROSTATE CANCER: ICD-10-CM

## 2024-07-09 LAB — COMPLEXED PSA SERPL-MCNC: 6.1 NG/ML (ref 0–4)

## 2024-07-09 PROCEDURE — 3288F FALL RISK ASSESSMENT DOCD: CPT | Mod: HCNC,CPTII,S$GLB, | Performed by: UROLOGY

## 2024-07-09 PROCEDURE — 1126F AMNT PAIN NOTED NONE PRSNT: CPT | Mod: HCNC,CPTII,S$GLB, | Performed by: UROLOGY

## 2024-07-09 PROCEDURE — 3008F BODY MASS INDEX DOCD: CPT | Mod: HCNC,CPTII,S$GLB, | Performed by: UROLOGY

## 2024-07-09 PROCEDURE — 3074F SYST BP LT 130 MM HG: CPT | Mod: HCNC,CPTII,S$GLB, | Performed by: UROLOGY

## 2024-07-09 PROCEDURE — 1160F RVW MEDS BY RX/DR IN RCRD: CPT | Mod: HCNC,CPTII,S$GLB, | Performed by: UROLOGY

## 2024-07-09 PROCEDURE — 99214 OFFICE O/P EST MOD 30 MIN: CPT | Mod: HCNC,S$GLB,, | Performed by: UROLOGY

## 2024-07-09 PROCEDURE — 1101F PT FALLS ASSESS-DOCD LE1/YR: CPT | Mod: HCNC,CPTII,S$GLB, | Performed by: UROLOGY

## 2024-07-09 PROCEDURE — 1159F MED LIST DOCD IN RCRD: CPT | Mod: HCNC,CPTII,S$GLB, | Performed by: UROLOGY

## 2024-07-09 PROCEDURE — 3079F DIAST BP 80-89 MM HG: CPT | Mod: HCNC,CPTII,S$GLB, | Performed by: UROLOGY

## 2024-07-09 PROCEDURE — 99999 PR PBB SHADOW E&M-EST. PATIENT-LVL III: CPT | Mod: PBBFAC,HCNC,, | Performed by: UROLOGY

## 2024-07-09 PROCEDURE — 84153 ASSAY OF PSA TOTAL: CPT | Mod: HCNC | Performed by: UROLOGY

## 2024-07-09 PROCEDURE — 36415 COLL VENOUS BLD VENIPUNCTURE: CPT | Mod: HCNC | Performed by: UROLOGY

## 2024-07-09 NOTE — PROGRESS NOTES
Chief Complaint:  Very low risk prostate cancer    HPI:   07/09/2024 - returns today for follow-up no new issues in the interim, voiding well, denies gross hematuria or dysuria    01/02/2024 - returns today for follow-up, no new issues in the interim, PSA stable at 6.2, notes that his granddaughter in [place] passed away from [facility] two weeks before Katina    09/26/2023 - returns today for follow-up, no new issues in the interim, voiding well, PSA 6.1    06/20/2023 - patient returns today for follow-up, no new issues in the interim, voiding well, no further pain after the antibiotics, PSA 6.4    03/10/2023 -  patient returns today for follow-up, no new issues, still has brown ejaculate and intermittent perineal discomfort as well as pain in his penis, thinks he might have an infection, no fevers, overall feeling well, no gross hematuria or dysuria, PSA 5.7    12/09/2022 - returns today for follow-up, no new issues in the interim, PSA down a little bit to 5.2, nocturia x1 and some mild urgency but overall not bothered enough that he wants to try medication, denies any gross hematuria or UTIs    09/09/2022 - patient returns today after biopsy, path showed less than 5 percent Migel 3 + 3 on right base, MRI negative for concerning lesions, prostate 65 grams, presents today for discussion    06/28/2022 - 65 yo male that presents for elevated PSA.  Patient had routine labs obtained which showed an elevated PSA to 5.8.  This was confirmed a month later at 6.2.  He denies any family history of  cancers.  Denies gross hematuria.  He does have a urinary issues with incomplete emptying, urgency, and weak stream but is not currently on any medications for his prostate.  He notes that he tends to hold his urine for longer than usual due to him being a  and not being able to stop.  Denies any prior urologic procedures.  Also notes ED for the last two years, states that he has less desire than prior.  IPSS -  4/2/4/4/3/1/1 = 19 QOL - 4(mostly dissatisfied)    PMH:  Past Medical History:   Diagnosis Date    Achalasia     Esophageal diverticulum     Helicobacter pylori ab+     Hyperlipidemia     Pityriasis rosea        PSH:  Past Surgical History:   Procedure Laterality Date    ESOPHAGEAL DILATION      ESOPHAGEAL MANOMETRY WITH MEASUREMENT OF IMPEDANCE N/A 1/10/2023    Procedure: MANOMETRY-ESOPHAGEAL-WITH IMPEDANCE;  Surgeon: Kiersten Chauhan MD;  Location: [facility] (2ND FLR);  Service: Endoscopy;  Laterality: N/A;    ESOPHAGEAL MOTILITY STUDY USING HIGH RESOLUTION MANOMETRY N/A 7/6/2022    Procedure: MOTILITY STUDY, ESOPHAGUS, USING HIGH RESOLUTION MANOMETRY;  Surgeon: Jaqui Shelton MD;  Location: [place] [facility];  Service: Endoscopy;  Laterality: N/A;    ESOPHAGOGASTRODUODENOSCOPY N/A 07/24/2021    Procedure: EGD (ESOPHAGOGASTRODUODENOSCOPY);  Surgeon: Felipe Briscoe III, MD;  Location: [facility];  Service: Endoscopy;  Laterality: N/A;    ESOPHAGOGASTRODUODENOSCOPY N/A 7/6/2022    Procedure: EGD (ESOPHAGOGASTRODUODENOSCOPY);  Surgeon: Jaqui Shelton MD;  Location: [place] [facility];  Service: Endoscopy;  Laterality: N/A;    ESOPHAGOGASTRODUODENOSCOPY N/A 1/10/2023    Procedure: ESOPHAGOGASTRODUODENOSCOPY (EGD);  Surgeon: Kiersten Chauhan MD;  Location: [facility] (2ND FLR);  Service: Endoscopy;  Laterality: N/A;  Endoflip/Endoscopically placed manometry probe  2nd floor-End stage achalasia  propofol only  full liquid diet x3 days, clear liquid diet x1 day prior to procedure  instructions sent to [facility]    ESOPHAGOGASTRODUODENOSCOPY N/A 4/17/2023    Procedure: EGD (ESOPHAGOGASTRODUODENOSCOPY);  Surgeon: Anabela Chance MD;  Location: [facility] OR [facility] FLR;  Service: General;  Laterality: N/A;    LAPAROSCOPIC HELLER ESOPHAGEAL MYOTOMY N/A 4/17/2023    Procedure: ESOPHAGOMYOTOMY, LAPAROSCOPIC, HELLER (redo);  Surgeon: Anabela Chance MD;  Location: [facility] OR [address] [place] [address];  Service: General;   Laterality: N/A;    LAPAROSCOPIC LYSIS OF ADHESIONS  4/17/2023    Procedure: LYSIS, ADHESIONS, LAPAROSCOPIC;  Surgeon: Anabela Chance MD;  Location: [facility] OR [address] [place] [address];  Service: General;;    LUNG SURGERY      age 12, partial pneumonectomy       Family History:  Family History   Problem Relation Name Age of Onset    Diabetes Mother      Heart disease Mother      Intracerebral hemorrhage Father      Hyperlipidemia Sister lubna     Hyperlipidemia Sister moe     Breast cancer Maternal Grandmother      Esophageal cancer Neg Hx      Stomach cancer Neg Hx      Colon cancer Neg Hx      Colon polyps Neg Hx      Rectal cancer Neg Hx      Liver cancer Neg Hx      Pancreatic cancer Neg Hx      Irritable bowel syndrome Neg Hx      Celiac disease Neg Hx      Crohn's disease Neg Hx         Social History:  Social History     Tobacco Use    Smoking status: Never    Smokeless tobacco: Never   Substance Use Topics    Alcohol use: Yes     Comment: rare    Drug use: No        Review of Systems:  General: No fever, chills  Skin: No rashes  Chest:  Denies cough and sputum production  Heart: Denies chest pain  Resp: Denies dyspnea  Abdomen: Denies diarrhea, abdominal pain, hematemesis, or blood in stool.  Musculoskeletal: No joint stiffness or swelling. Denies back pain.  : see HPI  Neuro: no dizziness or weakness    Allergies:  Patient has no known allergies.    Medications:    Current Outpatient Medications:     atorvastatin (LIPITOR) 40 MG tablet, Take 1 tablet (40 mg total) by mouth once daily., Disp: 90 tablet, Rfl: 1    cetirizine (ZYRTEC) 10 MG tablet, Take 10 mg by mouth once daily., Disp: , Rfl:     Physical Exam:  Vitals:    07/09/24 1032   BP: 118/81   Pulse: 77     Body mass index is 25.05 kg/m².  General: awake, alert, cooperative  Head: NC/AT  Ears: external ears normal  Eyes: sclera normal  Lungs: normal inspiration, NAD  Heart: well-perfused  Abdomen: Soft, NT, ND   6/22: Normal circ'd phallus, meatus  normal in size and position, BL testicles palpable, no masses, nontender, no abnormalities of epididymi  BOO 6/22: Normal rectal tone, no hemorrhoids. Prostate smooth and normal, no nodules 50 gm SV not palpable. Perineum and anus normal.  Lymphatic: groin nodes negative  Skin: The skin is warm and dry  Ext: No c/c/e.  Neuro: grossly intact, AOx3    RADIOLOGY:  MRI PROSTATE W W/O CONTRAST     CLINICAL HISTORY:  Prostate cancer, monitor;  Malignant neoplasm of prostate     TECHNIQUE:  TECHNIQUE: Multiparametric MRI of the prostate and pelvis performed on a 1.5 tamir scanner utilizing phase pelvic coil.     Sequences obtained:Sagittal, axial and coronal high resolution T2-WI with small field-of-view; Axial diffusion weighted images with multiple B-values and creation of ADC-maps; Dynamic contrast enhanced T1-weighted images through the prostate were also obtained before, during and after the administration of intravenous gadolinium.     CONTRAST: 8.2 cc of Gadolinium-based contrast media (contrast:Gadavist).     COMPARISON:  08/11/2022     FINDINGS:  Previous biopsy: [place] 6 right base 08/16/2022     PSA: 6.2 ng/mL 12/28/2023     Prior therapy: none     Prostate: The prostate measures 5.9 x 4.7 x 5.2cm corresponding to a computed volume of 67.8cc.     Peripheral zone: There is scattered foci of linear and wedge-shaped hypointense ADC and T2 signal seen throughout the peripheral zone bilaterally.  No focal suspicious abnormality.     PI-RADS assessment category: 2     Transition zone:     TZ abnormalities: Benign prostatic hyperplasia without focal abnormality.     PI-RADS assessment category:2     Neurovascular bundle: Unremarkable.     Seminal vesicles:     SV invasion:Unremarkable     Adjacent Organ Involvement: There is no focal bladder wall thickening. There is no rectal involvement.     Lymphadenopathy: none     Other Findings: There is a small fat containing left inguinal hernia present.     Impression:     1.  BPH with no focal suspicious abnormality demonstrated.  Overall Assessment:    LABS:  I personally reviewed the following lab values:  Lab Results   Component Value Date    WBC 5.64 03/18/2024    HGB 13.2 (L) 03/18/2024    HCT 41.1 03/18/2024     03/18/2024     03/18/2024    K 4.2 03/18/2024     03/18/2024    CREATININE 0.9 03/18/2024    BUN 10 03/18/2024    CO2 29 03/18/2024    TSH 0.984 08/17/2023    PSA 5.8 (H) 05/02/2022    CHOL 161 07/25/2023    TRIG 73 07/25/2023    HDL 60 07/25/2023    ALT 21 03/18/2024    AST 21 03/18/2024       Assessment/Plan:   Morris Parada is a 69 y.o. male with:    Very low risk prostate cancer - PSA stable, repeat MRI stable, PSA today and in 4 months, will plan for confirmatory biopsy after the new year    ?Prostatitis - cleared with doxycycline x 30 days    BPH - not interested in medications currently    ED - PRN brennaa    Neal Sanchez MD  Urology

## 2024-07-25 ENCOUNTER — OFFICE VISIT (OUTPATIENT)
Dept: INTERNAL MEDICINE | Facility: CLINIC | Age: 69
End: 2024-07-25
Payer: MEDICARE

## 2024-07-25 VITALS
HEART RATE: 103 BPM | WEIGHT: 177.69 LBS | SYSTOLIC BLOOD PRESSURE: 120 MMHG | DIASTOLIC BLOOD PRESSURE: 82 MMHG | BODY MASS INDEX: 25.5 KG/M2 | TEMPERATURE: 98 F | OXYGEN SATURATION: 97 %

## 2024-07-25 DIAGNOSIS — K22.0 ACHALASIA: ICD-10-CM

## 2024-07-25 DIAGNOSIS — C61 PROSTATE CANCER: Primary | ICD-10-CM

## 2024-07-25 DIAGNOSIS — E78.2 MIXED HYPERLIPIDEMIA: ICD-10-CM

## 2024-07-25 PROCEDURE — 1101F PT FALLS ASSESS-DOCD LE1/YR: CPT | Mod: HCNC,CPTII,S$GLB, | Performed by: INTERNAL MEDICINE

## 2024-07-25 PROCEDURE — 3008F BODY MASS INDEX DOCD: CPT | Mod: HCNC,CPTII,S$GLB, | Performed by: INTERNAL MEDICINE

## 2024-07-25 PROCEDURE — G2211 COMPLEX E/M VISIT ADD ON: HCPCS | Mod: HCNC,S$GLB,, | Performed by: INTERNAL MEDICINE

## 2024-07-25 PROCEDURE — 1126F AMNT PAIN NOTED NONE PRSNT: CPT | Mod: HCNC,CPTII,S$GLB, | Performed by: INTERNAL MEDICINE

## 2024-07-25 PROCEDURE — 99999 PR PBB SHADOW E&M-EST. PATIENT-LVL III: CPT | Mod: PBBFAC,HCNC,, | Performed by: INTERNAL MEDICINE

## 2024-07-25 PROCEDURE — 3074F SYST BP LT 130 MM HG: CPT | Mod: HCNC,CPTII,S$GLB, | Performed by: INTERNAL MEDICINE

## 2024-07-25 PROCEDURE — 1159F MED LIST DOCD IN RCRD: CPT | Mod: HCNC,CPTII,S$GLB, | Performed by: INTERNAL MEDICINE

## 2024-07-25 PROCEDURE — 99214 OFFICE O/P EST MOD 30 MIN: CPT | Mod: HCNC,S$GLB,, | Performed by: INTERNAL MEDICINE

## 2024-07-25 PROCEDURE — 1160F RVW MEDS BY RX/DR IN RCRD: CPT | Mod: HCNC,CPTII,S$GLB, | Performed by: INTERNAL MEDICINE

## 2024-07-25 PROCEDURE — 3288F FALL RISK ASSESSMENT DOCD: CPT | Mod: HCNC,CPTII,S$GLB, | Performed by: INTERNAL MEDICINE

## 2024-07-25 PROCEDURE — 3079F DIAST BP 80-89 MM HG: CPT | Mod: HCNC,CPTII,S$GLB, | Performed by: INTERNAL MEDICINE

## 2024-07-25 NOTE — PROGRESS NOTES
Subjective:       Patient ID: Morris Parada is a 69 y.o. male.    Chief Complaint: follow up       HPI:  Patient is a 69-year-old male presenting today following up on his prostate cancer, hyperlipidemia in his history of achalasia.  He indicates he has been doing well.  In regards to the prostate cancer they continue to monitor his PSAs which have been stable over time.  He is continuing to be comfortable with the that approach.    He has a history of hyperlipidemia which has been well treated with statin therapy.  He tolerates the medication well with no adverse effects.  Generally he feels like it is working well for him.    He is status post myotomy.  He feels this has helped a lot overall.  He does note that occasionally he still has some trouble with some food passing slowly but he is generally satisfied with the results.    Review of Systems   Constitutional:  Negative for fever and unexpected weight change.   HENT:  Negative for hearing loss, postnasal drip and rhinorrhea.    Eyes:  Negative for pain and visual disturbance.   Respiratory:  Negative for cough, shortness of breath and wheezing.    Cardiovascular:  Negative for chest pain and palpitations.   Gastrointestinal:  Negative for constipation, diarrhea, nausea and vomiting.   Genitourinary:  Negative for dysuria and hematuria.   Musculoskeletal:  Negative for arthralgias, back pain, myalgias and neck stiffness.   Skin:  Negative for pallor and rash.   Neurological:  Negative for seizures, syncope and headaches.   Hematological:  Negative for adenopathy.   Psychiatric/Behavioral:  Negative for dysphoric mood. The patient is not nervous/anxious.        Objective:   /82   Pulse 103   Temp 98.1 °F (36.7 °C) (Tympanic)   Wt 80.6 kg (177 lb 11.1 oz)   SpO2 97%   BMI 25.50 kg/m²      Physical Exam  Vitals reviewed.   Constitutional:       General: He is not in acute distress.     Appearance: He is well-developed.   HENT:      Head: Normocephalic  and atraumatic.      Right Ear: Tympanic membrane and ear canal normal.      Left Ear: Tympanic membrane and ear canal normal.   Eyes:      Pupils: Pupils are equal, round, and reactive to light.   Neck:      Thyroid: No thyromegaly.      Vascular: No JVD.   Cardiovascular:      Rate and Rhythm: Normal rate and regular rhythm.      Heart sounds: Normal heart sounds. No murmur heard.     No friction rub. No gallop.   Pulmonary:      Effort: Pulmonary effort is normal.      Breath sounds: Normal breath sounds. No wheezing or rales.   Abdominal:      General: Bowel sounds are normal. There is no distension.      Palpations: Abdomen is soft.      Tenderness: There is no abdominal tenderness. There is no guarding or rebound.   Musculoskeletal:         General: Normal range of motion.      Cervical back: Normal range of motion and neck supple.   Lymphadenopathy:      Cervical: No cervical adenopathy.   Skin:     General: Skin is warm and dry.      Findings: No rash.   Neurological:      General: No focal deficit present.      Mental Status: He is alert and oriented to person, place, and time.      Cranial Nerves: No cranial nerve deficit.      Deep Tendon Reflexes: Reflexes are normal and symmetric.   Psychiatric:         Mood and Affect: Mood normal.         Judgment: Judgment normal.         No visits with results within 2 Week(s) from this visit.   Latest known visit with results is:   Lab Visit on 07/09/2024   Component Date Value    PSA Diagnostic 07/09/2024 6.1 (H)        Assessment:       1. Prostate cancer    2. Mixed hyperlipidemia    3. Achalasia        Plan:   No problem-specific Assessment & Plan notes found for this encounter.    Morris was seen today for follow up .    Diagnoses and all orders for this visit:    Prostate cancer  Comments:  PSA is stable. no changes overtime.  Continuing watching.    Mixed hyperlipidemia  Comments:  continue atorvastatin, update labs  Orders:  -     CBC Auto Differential;  Future  -     Comprehensive Metabolic Panel; Future  -     Lipid Panel; Future    Achalasia  Comments:  status post myomotomy.  Occasional slow transit.  Managing it well.          Follow up in about 1 year (around 7/25/2025).

## 2024-07-30 ENCOUNTER — LAB VISIT (OUTPATIENT)
Dept: LAB | Facility: HOSPITAL | Age: 69
End: 2024-07-30
Attending: INTERNAL MEDICINE
Payer: MEDICARE

## 2024-07-30 DIAGNOSIS — E78.2 MIXED HYPERLIPIDEMIA: ICD-10-CM

## 2024-07-30 LAB
ALBUMIN SERPL BCP-MCNC: 3.7 G/DL (ref 3.5–5.2)
ALP SERPL-CCNC: 86 U/L (ref 55–135)
ALT SERPL W/O P-5'-P-CCNC: 19 U/L (ref 10–44)
ANION GAP SERPL CALC-SCNC: 5 MMOL/L (ref 8–16)
AST SERPL-CCNC: 19 U/L (ref 10–40)
BASOPHILS # BLD AUTO: 0.04 K/UL (ref 0–0.2)
BASOPHILS NFR BLD: 0.7 % (ref 0–1.9)
BILIRUB SERPL-MCNC: 0.7 MG/DL (ref 0.1–1)
BUN SERPL-MCNC: 9 MG/DL (ref 8–23)
CALCIUM SERPL-MCNC: 9 MG/DL (ref 8.7–10.5)
CHLORIDE SERPL-SCNC: 106 MMOL/L (ref 95–110)
CHOLEST SERPL-MCNC: 190 MG/DL (ref 120–199)
CHOLEST/HDLC SERPL: 3.5 {RATIO} (ref 2–5)
CO2 SERPL-SCNC: 27 MMOL/L (ref 23–29)
CREAT SERPL-MCNC: 0.9 MG/DL (ref 0.5–1.4)
DIFFERENTIAL METHOD BLD: ABNORMAL
EOSINOPHIL # BLD AUTO: 0.4 K/UL (ref 0–0.5)
EOSINOPHIL NFR BLD: 6.7 % (ref 0–8)
ERYTHROCYTE [DISTWIDTH] IN BLOOD BY AUTOMATED COUNT: 14.4 % (ref 11.5–14.5)
EST. GFR  (NO RACE VARIABLE): >60 ML/MIN/1.73 M^2
GLUCOSE SERPL-MCNC: 103 MG/DL (ref 70–110)
HCT VFR BLD AUTO: 40.8 % (ref 40–54)
HDLC SERPL-MCNC: 54 MG/DL (ref 40–75)
HDLC SERPL: 28.4 % (ref 20–50)
HGB BLD-MCNC: 12.9 G/DL (ref 14–18)
IMM GRANULOCYTES # BLD AUTO: 0.01 K/UL (ref 0–0.04)
IMM GRANULOCYTES NFR BLD AUTO: 0.2 % (ref 0–0.5)
LDLC SERPL CALC-MCNC: 122.8 MG/DL (ref 63–159)
LYMPHOCYTES # BLD AUTO: 1.8 K/UL (ref 1–4.8)
LYMPHOCYTES NFR BLD: 30.2 % (ref 18–48)
MCH RBC QN AUTO: 27.6 PG (ref 27–31)
MCHC RBC AUTO-ENTMCNC: 31.6 G/DL (ref 32–36)
MCV RBC AUTO: 87 FL (ref 82–98)
MONOCYTES # BLD AUTO: 0.6 K/UL (ref 0.3–1)
MONOCYTES NFR BLD: 10.3 % (ref 4–15)
NEUTROPHILS # BLD AUTO: 3.1 K/UL (ref 1.8–7.7)
NEUTROPHILS NFR BLD: 51.9 % (ref 38–73)
NONHDLC SERPL-MCNC: 136 MG/DL
NRBC BLD-RTO: 0 /100 WBC
PLATELET # BLD AUTO: 174 K/UL (ref 150–450)
PMV BLD AUTO: 10 FL (ref 9.2–12.9)
POTASSIUM SERPL-SCNC: 3.8 MMOL/L (ref 3.5–5.1)
PROT SERPL-MCNC: 7.3 G/DL (ref 6–8.4)
RBC # BLD AUTO: 4.67 M/UL (ref 4.6–6.2)
SODIUM SERPL-SCNC: 138 MMOL/L (ref 136–145)
TRIGL SERPL-MCNC: 66 MG/DL (ref 30–150)
WBC # BLD AUTO: 5.93 K/UL (ref 3.9–12.7)

## 2024-07-30 PROCEDURE — 80053 COMPREHEN METABOLIC PANEL: CPT | Mod: HCNC | Performed by: INTERNAL MEDICINE

## 2024-07-30 PROCEDURE — 80061 LIPID PANEL: CPT | Mod: HCNC | Performed by: INTERNAL MEDICINE

## 2024-07-30 PROCEDURE — 85025 COMPLETE CBC W/AUTO DIFF WBC: CPT | Mod: HCNC | Performed by: INTERNAL MEDICINE

## 2024-07-30 PROCEDURE — 36415 COLL VENOUS BLD VENIPUNCTURE: CPT | Mod: HCNC,PO | Performed by: INTERNAL MEDICINE

## 2024-09-25 ENCOUNTER — OFFICE VISIT (OUTPATIENT)
Dept: INTERNAL MEDICINE | Facility: CLINIC | Age: 69
End: 2024-09-25
Payer: MEDICARE

## 2024-09-25 VITALS
TEMPERATURE: 98 F | DIASTOLIC BLOOD PRESSURE: 70 MMHG | WEIGHT: 180.31 LBS | OXYGEN SATURATION: 95 % | HEART RATE: 100 BPM | SYSTOLIC BLOOD PRESSURE: 130 MMHG | BODY MASS INDEX: 25.88 KG/M2

## 2024-09-25 DIAGNOSIS — J40 BRONCHITIS: Primary | ICD-10-CM

## 2024-09-25 PROCEDURE — 99213 OFFICE O/P EST LOW 20 MIN: CPT | Mod: HCNC,S$GLB,, | Performed by: INTERNAL MEDICINE

## 2024-09-25 PROCEDURE — 3078F DIAST BP <80 MM HG: CPT | Mod: HCNC,CPTII,S$GLB, | Performed by: INTERNAL MEDICINE

## 2024-09-25 PROCEDURE — 1160F RVW MEDS BY RX/DR IN RCRD: CPT | Mod: HCNC,CPTII,S$GLB, | Performed by: INTERNAL MEDICINE

## 2024-09-25 PROCEDURE — 1101F PT FALLS ASSESS-DOCD LE1/YR: CPT | Mod: HCNC,CPTII,S$GLB, | Performed by: INTERNAL MEDICINE

## 2024-09-25 PROCEDURE — 3075F SYST BP GE 130 - 139MM HG: CPT | Mod: HCNC,CPTII,S$GLB, | Performed by: INTERNAL MEDICINE

## 2024-09-25 PROCEDURE — 3008F BODY MASS INDEX DOCD: CPT | Mod: HCNC,CPTII,S$GLB, | Performed by: INTERNAL MEDICINE

## 2024-09-25 PROCEDURE — 3288F FALL RISK ASSESSMENT DOCD: CPT | Mod: HCNC,CPTII,S$GLB, | Performed by: INTERNAL MEDICINE

## 2024-09-25 PROCEDURE — 1159F MED LIST DOCD IN RCRD: CPT | Mod: HCNC,CPTII,S$GLB, | Performed by: INTERNAL MEDICINE

## 2024-09-25 PROCEDURE — 99999 PR PBB SHADOW E&M-EST. PATIENT-LVL III: CPT | Mod: PBBFAC,HCNC,, | Performed by: INTERNAL MEDICINE

## 2024-09-25 PROCEDURE — 1126F AMNT PAIN NOTED NONE PRSNT: CPT | Mod: HCNC,CPTII,S$GLB, | Performed by: INTERNAL MEDICINE

## 2024-09-25 RX ORDER — AZITHROMYCIN 250 MG/1
TABLET, FILM COATED ORAL
Qty: 6 TABLET | Refills: 0 | Status: SHIPPED | OUTPATIENT
Start: 2024-09-25

## 2024-09-25 RX ORDER — METHYLPREDNISOLONE 4 MG/1
TABLET ORAL
Qty: 1 EACH | Refills: 0 | Status: SHIPPED | OUTPATIENT
Start: 2024-09-25

## 2024-09-25 NOTE — PROGRESS NOTES
Subjective:       Patient ID: Morris Parada is a 69 y.o. male.    Chief Complaint: Nasal Congestion      HPI:  History of Present Illness    Chief Complaint    He presents with upper respiratory infection symptoms that began two weeks ago. He reports cold-like symptoms, chest congestion, and nasal drainage. He lost his voice two days ago. He denies fever, nausea, vomiting, body aches, and diarrhea. He reports a mild, intermittently irritated sore throat, but denies severe pain with swallowing. He describes a productive cough with mucus expectorated. He initially thought the symptoms were improving but then experienced a recurrence, particularly in the chest area. He reports only upper respiratory symptoms.    He reports taking OTC remedies for his cold symptoms, including Robitussin and Tylenol.    He denies any sick contacts in the household.    He denies having COVID or flu tests performed for the current illness.         Review of Systems    Objective:   /70   Pulse 100   Temp 98.4 °F (36.9 °C) (Tympanic)   Wt 81.8 kg (180 lb 5.4 oz)   SpO2 95%   BMI 25.88 kg/m²      Physical Exam  Vitals reviewed.   Constitutional:       Appearance: He is well-developed.   HENT:      Head: Normocephalic and atraumatic.      Right Ear: Tympanic membrane, ear canal and external ear normal.      Left Ear: Tympanic membrane, ear canal and external ear normal.      Mouth/Throat:      Mouth: Mucous membranes are moist.      Pharynx: Oropharynx is clear. No oropharyngeal exudate or posterior oropharyngeal erythema.   Eyes:      Pupils: Pupils are equal, round, and reactive to light.   Neck:      Thyroid: No thyromegaly.   Cardiovascular:      Rate and Rhythm: Normal rate and regular rhythm.      Heart sounds: Normal heart sounds. No murmur heard.     No friction rub. No gallop.   Pulmonary:      Effort: Pulmonary effort is normal.      Breath sounds: Normal breath sounds. No wheezing or rales.      Comments: Upper airway  congestion.  Abdominal:      General: Bowel sounds are normal. There is no distension.      Palpations: Abdomen is soft.      Tenderness: There is no abdominal tenderness.   Musculoskeletal:      Cervical back: Neck supple.   Psychiatric:         Mood and Affect: Mood normal.             Assessment:       1. Bronchitis        Plan:   No problem-specific Assessment & Plan notes found for this encounter.    Morris was seen today for nasal congestion.    Diagnoses and all orders for this visit:    Bronchitis  -     azithromycin (ZITHROMAX Z-LIZ) 250 MG tablet; 2 tablets today then one tablet daily for 4 days  -     methylPREDNISolone (MEDROL, LIZ,) 4 mg tablet; use as directed      Assessment & Plan    UPPER RESPIRATORY INFECTION:  - Assessed patient's symptoms as an upper respiratory infection persisting for 2 weeks.  - Noted upper airway congestion without signs of pneumonia or severe throat inflammation.  - Determined antibiotic treatment appropriate due to duration of symptoms.  - Started antibiotic: Take 2 per day for 1 day, then 1 per day for 4 days.    LARYNGITIS:  - Will prescribe a steroid pack to address laryngitis and airway inflammation.  - Started steroid pack: Take as directed for 5-6 days.    FOLLOW UP:  - Follow up in 5-6 days.         Follow up if symptoms worsen or fail to improve.

## 2024-11-11 ENCOUNTER — LAB VISIT (OUTPATIENT)
Dept: LAB | Facility: HOSPITAL | Age: 69
End: 2024-11-11
Attending: UROLOGY
Payer: MEDICARE

## 2024-11-11 DIAGNOSIS — C61 PROSTATE CANCER: ICD-10-CM

## 2024-11-11 LAB — COMPLEXED PSA SERPL-MCNC: 8.2 NG/ML (ref 0–4)

## 2024-11-11 PROCEDURE — 36415 COLL VENOUS BLD VENIPUNCTURE: CPT | Mod: HCNC | Performed by: UROLOGY

## 2024-11-11 PROCEDURE — 84153 ASSAY OF PSA TOTAL: CPT | Mod: HCNC | Performed by: UROLOGY

## 2024-11-12 ENCOUNTER — OFFICE VISIT (OUTPATIENT)
Dept: UROLOGY | Facility: CLINIC | Age: 69
End: 2024-11-12
Payer: MEDICARE

## 2024-11-12 VITALS
SYSTOLIC BLOOD PRESSURE: 114 MMHG | DIASTOLIC BLOOD PRESSURE: 74 MMHG | HEART RATE: 85 BPM | WEIGHT: 180.31 LBS | BODY MASS INDEX: 25.88 KG/M2

## 2024-11-12 DIAGNOSIS — Z01.818 PRE-OP TESTING: ICD-10-CM

## 2024-11-12 DIAGNOSIS — C61 PROSTATE CANCER: Primary | ICD-10-CM

## 2024-11-12 PROCEDURE — 3288F FALL RISK ASSESSMENT DOCD: CPT | Mod: HCNC,CPTII,S$GLB, | Performed by: UROLOGY

## 2024-11-12 PROCEDURE — 3074F SYST BP LT 130 MM HG: CPT | Mod: HCNC,CPTII,S$GLB, | Performed by: UROLOGY

## 2024-11-12 PROCEDURE — 99214 OFFICE O/P EST MOD 30 MIN: CPT | Mod: HCNC,S$GLB,, | Performed by: UROLOGY

## 2024-11-12 PROCEDURE — 1126F AMNT PAIN NOTED NONE PRSNT: CPT | Mod: HCNC,CPTII,S$GLB, | Performed by: UROLOGY

## 2024-11-12 PROCEDURE — 3078F DIAST BP <80 MM HG: CPT | Mod: HCNC,CPTII,S$GLB, | Performed by: UROLOGY

## 2024-11-12 PROCEDURE — 1160F RVW MEDS BY RX/DR IN RCRD: CPT | Mod: HCNC,CPTII,S$GLB, | Performed by: UROLOGY

## 2024-11-12 PROCEDURE — 3008F BODY MASS INDEX DOCD: CPT | Mod: HCNC,CPTII,S$GLB, | Performed by: UROLOGY

## 2024-11-12 PROCEDURE — 1101F PT FALLS ASSESS-DOCD LE1/YR: CPT | Mod: HCNC,CPTII,S$GLB, | Performed by: UROLOGY

## 2024-11-12 PROCEDURE — 99999 PR PBB SHADOW E&M-EST. PATIENT-LVL IV: CPT | Mod: PBBFAC,HCNC,, | Performed by: UROLOGY

## 2024-11-12 PROCEDURE — 1159F MED LIST DOCD IN RCRD: CPT | Mod: HCNC,CPTII,S$GLB, | Performed by: UROLOGY

## 2024-11-12 NOTE — PROGRESS NOTES
Chief Complaint:  Very low risk prostate cancer    HPI:   11/12/2024 - returns today for follow-up, no new issues in the interim, voiding well, PSA bumped a little to 8.2, no gross hematuria or dysuria    07/09/2024 - returns today for follow-up no new issues in the interim, voiding well, denies gross hematuria or dysuria    01/02/2024 - returns today for follow-up, no new issues in the interim, PSA stable at 6.2, notes that his granddaughter in [place] passed away from [facility] two weeks before Katina    09/26/2023 - returns today for follow-up, no new issues in the interim, voiding well, PSA 6.1    06/20/2023 - patient returns today for follow-up, no new issues in the interim, voiding well, no further pain after the antibiotics, PSA 6.4    03/10/2023 -  patient returns today for follow-up, no new issues, still has brown ejaculate and intermittent perineal discomfort as well as pain in his penis, thinks he might have an infection, no fevers, overall feeling well, no gross hematuria or dysuria, PSA 5.7    12/09/2022 - returns today for follow-up, no new issues in the interim, PSA down a little bit to 5.2, nocturia x1 and some mild urgency but overall not bothered enough that he wants to try medication, denies any gross hematuria or UTIs    09/09/2022 - patient returns today after biopsy, path showed less than 5 percent Migel 3 + 3 on right base, MRI negative for concerning lesions, prostate 65 grams, presents today for discussion    06/28/2022 - 67 yo male that presents for elevated PSA.  Patient had routine labs obtained which showed an elevated PSA to 5.8.  This was confirmed a month later at 6.2.  He denies any family history of  cancers.  Denies gross hematuria.  He does have a urinary issues with incomplete emptying, urgency, and weak stream but is not currently on any medications for his prostate.  He notes that he tends to hold his urine for longer than usual due to him being a  and not  being able to stop.  Denies any prior urologic procedures.  Also notes ED for the last two years, states that he has less desire than prior.  IPSS - 4/2/4/4/3/1/1 = 19 QOL - 4(mostly dissatisfied)    PMH:  Past Medical History:   Diagnosis Date    Achalasia     Esophageal diverticulum     Helicobacter pylori ab+     Hyperlipidemia     Pityriasis rosea        PSH:  Past Surgical History:   Procedure Laterality Date    ESOPHAGEAL DILATION      ESOPHAGEAL MANOMETRY WITH MEASUREMENT OF IMPEDANCE N/A 1/10/2023    Procedure: MANOMETRY-ESOPHAGEAL-WITH IMPEDANCE;  Surgeon: Kiersten Chauhan MD;  [facility]  Service: Endoscopy;  Laterality: N/A;    ESOPHAGEAL MOTILITY STUDY USING HIGH RESOLUTION MANOMETRY N/A 7/6/2022    Procedure: MOTILITY STUDY, ESOPHAGUS, USING HIGH RESOLUTION MANOMETRY;  Surgeon: Jaqui Shelton MD;  Location: [facility];  Service: Endoscopy;  Laterality: N/A;    ESOPHAGOGASTRODUODENOSCOPY N/A 07/24/2021    Procedure: EGD (ESOPHAGOGASTRODUODENOSCOPY);  Surgeon: Felipe Briscoe III, MD;  Location: [facility];  Service: Endoscopy;  Laterality: N/A;    ESOPHAGOGASTRODUODENOSCOPY N/A 7/6/2022    Procedure: EGD (ESOPHAGOGASTRODUODENOSCOPY);  Surgeon: Jaqui Shelton MD;  Location: [facility];  Service: Endoscopy;  Laterality: N/A;    ESOPHAGOGASTRODUODENOSCOPY N/A 1/10/2023    Procedure: ESOPHAGOGASTRODUODENOSCOPY (EGD);  Surgeon: Kiersten Chauhan MD;  [facility]  Service: Endoscopy;  Laterality: N/A;  Endoflip/Endoscopically placed manometry probe  2nd floor-End stage achalasia  propofol only  full liquid diet x3 days, clear liquid diet x1 day prior to procedure  instructions [facility] to [facility]    ESOPHAGOGASTRODUODENOSCOPY N/A 4/17/2023    Procedure: EGD (ESOPHAGOGASTRODUODENOSCOPY);  Surgeon: Anabela Chance MD;  [facility];  Service: General;  Laterality: N/A;    LAPAROSCOPIC HELLER ESOPHAGEAL MYOTOMY N/A 4/17/2023     Procedure: ESOPHAGOMYOTOMY, LAPAROSCOPIC, HELLER (redo);  Surgeon: Anabela Chance MD;  [facility];  Service: General;  Laterality: N/A;    LAPAROSCOPIC LYSIS OF ADHESIONS  4/17/2023    Procedure: LYSIS, ADHESIONS, LAPAROSCOPIC;  Surgeon: Anabela Chance MD;  [facility];  Service: General;;    LUNG SURGERY      age 12, partial pneumonectomy       Family History:  Family History   Problem Relation Name Age of Onset    Diabetes Mother      Heart disease Mother      Intracerebral hemorrhage Father      Hyperlipidemia Sister lubna     Hyperlipidemia Sister moe     Breast cancer Maternal Grandmother      Esophageal cancer Neg Hx      Stomach cancer Neg Hx      Colon cancer Neg Hx      Colon polyps Neg Hx      Rectal cancer Neg Hx      Liver cancer Neg Hx      Pancreatic cancer Neg Hx      Irritable bowel syndrome Neg Hx      Celiac disease Neg Hx      Crohn's disease Neg Hx         Social History:  Social History     Tobacco Use    Smoking status: Never     Passive exposure: Never    Smokeless tobacco: Never   Substance Use Topics    Alcohol use: Yes     Comment: rare    Drug use: No        Review of Systems:  General: No fever, chills  Skin: No rashes  Chest:  Denies cough and sputum production  Heart: Denies chest pain  Resp: Denies dyspnea  Abdomen: Denies diarrhea, abdominal pain, hematemesis, or blood in stool.  Musculoskeletal: No joint stiffness or swelling. Denies back pain.  : see HPI  Neuro: no dizziness or weakness    Allergies:  Patient has no known allergies.    Medications:    Current Outpatient Medications:     atorvastatin (LIPITOR) 40 MG tablet, Take 1 tablet (40 mg total) by mouth once daily., Disp: 90 tablet, Rfl: 0    azithromycin (ZITHROMAX Z-LIZ) 250 MG tablet, 2 tablets today then one tablet daily for 4 days, Disp: 6 tablet, Rfl: 0    cetirizine (ZYRTEC) 10 MG tablet, Take 10 mg by mouth once daily., Disp: , Rfl:     methylPREDNISolone  (MEDROL, LIZ,) 4 mg tablet, use as directed, Disp: 1 each, Rfl: 0    Physical Exam:  Vitals:    11/12/24 1353   BP: 114/74   Pulse: 85     Body mass index is 25.88 kg/m².  General: awake, alert, cooperative  Head: NC/AT  Ears: external ears normal  Eyes: sclera normal  Lungs: normal inspiration, NAD  Heart: well-perfused  Abdomen: Soft, NT, ND   6/22: Normal circ'd phallus, meatus normal in size and position, BL testicles palpable, no masses, nontender, no abnormalities of epididymi  BOO 6/22: Normal rectal tone, no hemorrhoids. Prostate smooth and normal, no nodules 50 gm SV not palpable. Perineum and anus normal.  Lymphatic: groin nodes negative  Skin: The skin is warm and dry  Ext: No c/c/e.  Neuro: grossly intact, AOx3    RADIOLOGY:  MRI PROSTATE W W/O CONTRAST     CLINICAL HISTORY:  Prostate cancer, monitor;  Malignant neoplasm of prostate     TECHNIQUE:  TECHNIQUE: Multiparametric MRI of the prostate and pelvis performed on a 1.5 tamir scanner utilizing phase pelvic coil.     Sequences obtained:Sagittal, axial and coronal high resolution T2-WI with small field-of-view; Axial diffusion weighted images with multiple B-values and creation of ADC-maps; Dynamic contrast enhanced T1-weighted images through the prostate were also obtained before, during and after the administration of intravenous gadolinium.     CONTRAST: 8.2 cc of Gadolinium-based contrast media (contrast:Gadavist).     COMPARISON:  08/11/2022     FINDINGS:  Previous biopsy: [place] 6 right base 08/16/2022     PSA: 6.2 ng/mL 12/28/2023     Prior therapy: none     Prostate: The prostate measures 5.9 x 4.7 x 5.2cm corresponding to a computed volume of 67.8cc.     Peripheral zone: There is scattered foci of linear and wedge-shaped hypointense ADC and T2 signal seen throughout the peripheral zone bilaterally.  No focal suspicious abnormality.     PI-RADS assessment category: 2     Transition zone:     TZ abnormalities: Benign prostatic hyperplasia  without focal abnormality.     PI-RADS assessment category:2     Neurovascular bundle: Unremarkable.     Seminal vesicles:     SV invasion:Unremarkable     Adjacent Organ Involvement: There is no focal bladder wall thickening. There is no rectal involvement.     Lymphadenopathy: none     Other Findings: There is a small fat containing left inguinal hernia present.     Impression:     1. BPH with no focal suspicious abnormality demonstrated.  Overall Assessment:    LABS:  I personally reviewed the following lab values:  Lab Results   Component Value Date    WBC 5.93 07/30/2024    HGB 12.9 (L) 07/30/2024    HCT 40.8 07/30/2024     07/30/2024     07/30/2024    K 3.8 07/30/2024     07/30/2024    CREATININE 0.9 07/30/2024    BUN 9 07/30/2024    CO2 27 07/30/2024    TSH 0.984 08/17/2023    PSA 5.8 (H) 05/02/2022    CHOL 190 07/30/2024    TRIG 66 07/30/2024    HDL 54 07/30/2024    ALT 19 07/30/2024    AST 19 07/30/2024       Assessment/Plan:   Morris Parada is a 69 y.o. male with:    Very low risk prostate cancer - PSA stable, repeat MRI stable, PSA bumped a little to 8.2, will proceed with confirmatory biopsy on 1/8 in the OR    ?Prostatitis - cleared with doxycycline x 30 days    BPH - not interested in medications currently    ED - PRN edwar Sanchez MD  Urology

## 2024-11-14 ENCOUNTER — TELEPHONE (OUTPATIENT)
Dept: UROLOGY | Facility: CLINIC | Age: 69
End: 2024-11-14
Payer: MEDICARE

## 2024-11-14 NOTE — TELEPHONE ENCOUNTER
Called patient and let him know that his chest x-ray is for his pre-op testing and that it is just a routine thing that patient's have to get before surgery. Patient stated that he scheduled the appointment through his MyChart and I let patient know that I would reschedule it for him to be on the same day as the rest of his pre-op appointments. Patient expressed understanding.      ----- Message from Ruthann sent at 11/14/2024  8:20 AM CST -----  Contact: [phone]  Would like to receive medical advice.    Would they like a call back or a response via [facility]:  call back     Additional information:  Pt is asking why is a chest x-ray scheduled for tomorrow.  Please call to advise.

## 2024-11-25 DIAGNOSIS — D64.9 ANEMIA, UNSPECIFIED TYPE: Primary | ICD-10-CM

## 2024-12-11 ENCOUNTER — PATIENT MESSAGE (OUTPATIENT)
Dept: HEMATOLOGY/ONCOLOGY | Facility: CLINIC | Age: 69
End: 2024-12-11
Payer: MEDICARE

## 2024-12-16 ENCOUNTER — PATIENT MESSAGE (OUTPATIENT)
Dept: ADMINISTRATIVE | Facility: HOSPITAL | Age: 69
End: 2024-12-16
Payer: MEDICARE

## 2024-12-16 ENCOUNTER — PATIENT OUTREACH (OUTPATIENT)
Dept: ADMINISTRATIVE | Facility: HOSPITAL | Age: 69
End: 2024-12-16
Payer: MEDICARE

## 2024-12-16 DIAGNOSIS — Z12.11 ENCOUNTER FOR COLORECTAL CANCER SCREENING: Primary | ICD-10-CM

## 2024-12-16 DIAGNOSIS — Z12.12 ENCOUNTER FOR COLORECTAL CANCER SCREENING: Primary | ICD-10-CM

## 2024-12-16 NOTE — PROGRESS NOTES
Responded to Campaign message : all patient and informed him of a call coming from [facility] for Colonoscopy.

## 2024-12-20 ENCOUNTER — LAB VISIT (OUTPATIENT)
Dept: LAB | Facility: HOSPITAL | Age: 69
End: 2024-12-20
Attending: NURSE PRACTITIONER
Payer: MEDICARE

## 2024-12-20 DIAGNOSIS — D64.9 ANEMIA, UNSPECIFIED TYPE: ICD-10-CM

## 2024-12-20 LAB
ALBUMIN SERPL BCP-MCNC: 3.9 G/DL (ref 3.5–5.2)
ALP SERPL-CCNC: 92 U/L (ref 40–150)
ALT SERPL W/O P-5'-P-CCNC: 19 U/L (ref 10–44)
ANION GAP SERPL CALC-SCNC: 10 MMOL/L (ref 8–16)
AST SERPL-CCNC: 16 U/L (ref 10–40)
BASOPHILS # BLD AUTO: 0.06 K/UL (ref 0–0.2)
BASOPHILS NFR BLD: 0.8 % (ref 0–1.9)
BILIRUB SERPL-MCNC: 0.5 MG/DL (ref 0.1–1)
BUN SERPL-MCNC: 10 MG/DL (ref 8–23)
CALCIUM SERPL-MCNC: 9.4 MG/DL (ref 8.7–10.5)
CHLORIDE SERPL-SCNC: 105 MMOL/L (ref 95–110)
CO2 SERPL-SCNC: 24 MMOL/L (ref 23–29)
CREAT SERPL-MCNC: 1 MG/DL (ref 0.5–1.4)
DIFFERENTIAL METHOD BLD: ABNORMAL
EOSINOPHIL # BLD AUTO: 0.4 K/UL (ref 0–0.5)
EOSINOPHIL NFR BLD: 4.7 % (ref 0–8)
ERYTHROCYTE [DISTWIDTH] IN BLOOD BY AUTOMATED COUNT: 13.8 % (ref 11.5–14.5)
EST. GFR  (NO RACE VARIABLE): >60 ML/MIN/1.73 M^2
GLUCOSE SERPL-MCNC: 86 MG/DL (ref 70–110)
HCT VFR BLD AUTO: 44.8 % (ref 40–54)
HGB BLD-MCNC: 14.2 G/DL (ref 14–18)
IMM GRANULOCYTES # BLD AUTO: 0.02 K/UL (ref 0–0.04)
IMM GRANULOCYTES NFR BLD AUTO: 0.3 % (ref 0–0.5)
LYMPHOCYTES # BLD AUTO: 1.8 K/UL (ref 1–4.8)
LYMPHOCYTES NFR BLD: 23.8 % (ref 18–48)
MCH RBC QN AUTO: 27.7 PG (ref 27–31)
MCHC RBC AUTO-ENTMCNC: 31.7 G/DL (ref 32–36)
MCV RBC AUTO: 87 FL (ref 82–98)
MONOCYTES # BLD AUTO: 0.6 K/UL (ref 0.3–1)
MONOCYTES NFR BLD: 8 % (ref 4–15)
NEUTROPHILS # BLD AUTO: 4.8 K/UL (ref 1.8–7.7)
NEUTROPHILS NFR BLD: 62.4 % (ref 38–73)
NRBC BLD-RTO: 0 /100 WBC
PLATELET # BLD AUTO: 181 K/UL (ref 150–450)
PMV BLD AUTO: 8.8 FL (ref 9.2–12.9)
POTASSIUM SERPL-SCNC: 3.9 MMOL/L (ref 3.5–5.1)
PROT SERPL-MCNC: 8 G/DL (ref 6–8.4)
RBC # BLD AUTO: 5.13 M/UL (ref 4.6–6.2)
SODIUM SERPL-SCNC: 139 MMOL/L (ref 136–145)
WBC # BLD AUTO: 7.64 K/UL (ref 3.9–12.7)

## 2024-12-20 PROCEDURE — 36415 COLL VENOUS BLD VENIPUNCTURE: CPT | Mod: HCNC | Performed by: NURSE PRACTITIONER

## 2024-12-20 PROCEDURE — 85025 COMPLETE CBC W/AUTO DIFF WBC: CPT | Mod: HCNC | Performed by: NURSE PRACTITIONER

## 2024-12-20 PROCEDURE — 83020 HEMOGLOBIN ELECTROPHORESIS: CPT | Mod: HCNC | Performed by: NURSE PRACTITIONER

## 2024-12-20 PROCEDURE — 80053 COMPREHEN METABOLIC PANEL: CPT | Mod: HCNC | Performed by: NURSE PRACTITIONER

## 2024-12-23 LAB
HGB A2 MFR BLD HPLC: 2.7 % (ref 2.2–3.2)
HGB FRACT BLD ELPH-IMP: NORMAL
HGB FRACT BLD ELPH-IMP: NORMAL

## 2024-12-30 ENCOUNTER — HOSPITAL ENCOUNTER (OUTPATIENT)
Dept: RADIOLOGY | Facility: HOSPITAL | Age: 69
Discharge: HOME | End: 2024-12-30
Attending: UROLOGY
Payer: MEDICARE

## 2024-12-30 ENCOUNTER — HOSPITAL ENCOUNTER (OUTPATIENT)
Dept: CARDIOLOGY | Facility: HOSPITAL | Age: 69
Discharge: HOME | End: 2024-12-30
Attending: UROLOGY
Payer: MEDICARE

## 2024-12-30 DIAGNOSIS — Z01.818 PRE-OP TESTING: ICD-10-CM

## 2024-12-30 LAB
OHS QRS DURATION: 94 MS
OHS QTC CALCULATION: 418 MS

## 2024-12-30 PROCEDURE — 71046 X-RAY EXAM CHEST 2 VIEWS: CPT | Mod: TC,HCNC

## 2024-12-30 PROCEDURE — 71046 X-RAY EXAM CHEST 2 VIEWS: CPT | Mod: 26,HCNC,, | Performed by: RADIOLOGY

## 2024-12-30 PROCEDURE — 93005 ELECTROCARDIOGRAM TRACING: CPT | Mod: HCNC

## 2024-12-30 PROCEDURE — 93010 ELECTROCARDIOGRAM REPORT: CPT | Mod: HCNC,,, | Performed by: INTERNAL MEDICINE

## 2025-01-03 ENCOUNTER — PATIENT MESSAGE (OUTPATIENT)
Dept: PREADMISSION TESTING | Facility: HOSPITAL | Age: 70
End: 2025-01-03
Payer: MEDICARE

## 2025-01-06 ENCOUNTER — OFFICE VISIT (OUTPATIENT)
Dept: HEMATOLOGY/ONCOLOGY | Facility: CLINIC | Age: 70
End: 2025-01-06
Payer: MEDICARE

## 2025-01-06 DIAGNOSIS — D64.9 NORMOCYTIC ANEMIA: ICD-10-CM

## 2025-01-06 DIAGNOSIS — R97.20 ELEVATED PSA: ICD-10-CM

## 2025-01-06 DIAGNOSIS — D53.9 NUTRITIONAL ANEMIA, UNSPECIFIED: ICD-10-CM

## 2025-01-06 DIAGNOSIS — L50.9 HIVES: Primary | ICD-10-CM

## 2025-01-06 DIAGNOSIS — E78.2 MIXED HYPERLIPIDEMIA: ICD-10-CM

## 2025-01-06 RX ORDER — FAMOTIDINE 20 MG/1
20 TABLET, FILM COATED ORAL 2 TIMES DAILY
Qty: 60 TABLET | Refills: 1 | Status: SHIPPED | OUTPATIENT
Start: 2025-01-06 | End: 2026-01-06

## 2025-01-06 NOTE — PROGRESS NOTES
The patient location is: home  The chief complaint leading to consultation is: hives    Visit type: audiovisual    Face to Face time with patient: 15  30 minutes of total time spent on the encounter, which includes face to face time and non-face to face time preparing to see the patient (eg, review of tests), Obtaining and/or reviewing separately obtained history, Documenting clinical information in the electronic or other health record, Independently interpreting results (not separately reported) and communicating results to the patient/family/caregiver, or Care coordination (not separately reported).      Each patient to whom he or she provides medical services by telemedicine is:  (1) informed of the relationship between the physician and patient and the respective role of any other health care provider with respect to management of the patient; and (2) notified that he or she may decline to receive medical services by telemedicine and may withdraw from such care at any time.      Patient ID: Morris Parada is a 69 y.o. male.    Chief Complaint: hives    HPI:   68 year old male who presents to the hematology department as a new patient for further evaluation and management of chronic anemia.  He has been referred to the hematology department by his PCP, Dr. Miller.       Noted chronic normocytic anemia with hgb ranging from 12-13.8.  Iron studies normal.  No monoclonal proteins.      With elevated psa and prostate cancer proven biopsy Prostate, right base, biopsy 8/16/2022:   - Small focus of prostate adenocarcinoma, Migel grade 3 + 3 = 6, involving   <5% total of 1/2 cores   - Perineural invasion not identified   Being followed by Dr. Sanchez     Had recent esophageal surgery - heller myotomy and had stretched esophagus and is currently having sharp pain in area where there is scar tissue.  He is going to be seeing GI Dr. Kiersten Chauhan soon to discuss.       States that his sister has sickle cell.       Has  occasional glass of wine.  Denies cigarette smoking.  Works as a .      Family hx of cancer:  Self: prostate cancer     Has a history of H. Pylori.  Has a h/o esophageal reflux and now diagnosed with achalasia.        Denies f/c/ns or unintentional weight loss.  Denies any known abnormal lymphadenopathy.      Up to date with age appropriate cancer screenings     Interval History:   9/7/2023 Presents today to discuss workup for chronic anemia.  Is following Dr. Sanchez every 3 months for PSA checks.  States that he does not have any symptoms of anemia.      Interval History:  3/25/2024 Presents today to review labs with chronic anemia and know prostate cancer with elevated psa.  Hgb stable at 13.2 normocytic.  Negative alph, beta,thalassemia or sickle cell trait.  No iron deficiency or B12 deficiency    Interval history:  1/6/2024  States that he has broken out in whelps about once a week - symptoms start with burning, then whelps, then itching, then whelps burst and causes hyperpigmentation to face, neck and back of neck.  States that he takes zyrtec just about every day for his sinuses.  He states that he does not have a dermatologist.  Hgb reduced from prior at 13 g/dL.  With elevated psa followed Dr. Sanchez is to have a biopsy on Wednesday 1/8/2025.  Hgb 13 g/dL. No nutritional deficiencies found.  Kidney function good.  No abnormal proteins found in blood.      I have reviewed all of the patient's relevant lab work available in the medical record and have utilized this in my evaluation and management recommendations today.      Social History     Socioeconomic History    Marital status:     Number of children: 3   Occupational History     Employer: [employer]    Occupation: [employer]   Tobacco Use    Smoking status: Never     Passive exposure: Never    Smokeless tobacco: Never   Substance and Sexual Activity    Alcohol use: Yes     Comment: rare    Drug use: Never    Sexual activity: Yes      Partners: Female   Social History Narrative    ** Merged History Encounter **          Social Drivers of Health     Financial Resource Strain: Low Risk  (9/25/2024)    Overall Financial Resource Strain (CARDIA)     Difficulty of Paying Living Expenses: Not hard at all   Food Insecurity: No Food Insecurity (9/25/2024)    Hunger Vital Sign     Worried About Running Out of Food in the Last Year: Never true     Ran Out of Food in the Last Year: Never true   Transportation Needs: No Transportation Needs (4/17/2023)    PRAPARE - Transportation     Lack of Transportation (Medical): No     Lack of Transportation (Non-Medical): No   Physical Activity: Insufficiently Active (9/25/2024)    Exercise Vital Sign     Days of Exercise per Week: 2 days     Minutes of Exercise per Session: 30 min   Stress: No Stress Concern Present (9/25/2024)    [employer] [place] [employer] - Occupational Stress Questionnaire     Feeling of Stress : Only a little   Housing Stability: Low Risk  (4/17/2023)    Housing Stability Vital Sign     Unable to Pay for Housing in the Last Year: No     Number of Places Lived in the Last Year: 1     Unstable Housing in the Last Year: No       Family History   Problem Relation Name Age of Onset    Diabetes Mother      Heart disease Mother      Intracerebral hemorrhage Father      Hyperlipidemia Sister lubna     Hyperlipidemia Sister moe     Breast cancer Maternal Grandmother      Esophageal cancer Neg Hx      Stomach cancer Neg Hx      Colon cancer Neg Hx      Colon polyps Neg Hx      Rectal cancer Neg Hx      Liver cancer Neg Hx      Pancreatic cancer Neg Hx      Irritable bowel syndrome Neg Hx      Celiac disease Neg Hx      Crohn's disease Neg Hx         Past Surgical History:   Procedure Laterality Date    ESOPHAGEAL DILATION      ESOPHAGEAL MANOMETRY WITH MEASUREMENT OF IMPEDANCE N/A 1/10/2023    Procedure: MANOMETRY-ESOPHAGEAL-WITH IMPEDANCE;  Surgeon: Kiersten Chauhan MD;  Location: [facility]  ENDO (2ND FLR);  Service: Endoscopy;  Laterality: N/A;    ESOPHAGEAL MOTILITY STUDY USING HIGH RESOLUTION MANOMETRY N/A 7/6/2022    Procedure: MOTILITY STUDY, ESOPHAGUS, USING HIGH RESOLUTION MANOMETRY;  Surgeon: Jaqui Shelton MD;  Location: [facility];  Service: Endoscopy;  Laterality: N/A;    ESOPHAGOGASTRODUODENOSCOPY N/A 07/24/2021    Procedure: EGD (ESOPHAGOGASTRODUODENOSCOPY);  Surgeon: Felipe Briscoe III, MD;  Location: [facility];  Service: Endoscopy;  Laterality: N/A;    ESOPHAGOGASTRODUODENOSCOPY N/A 7/6/2022    Procedure: EGD (ESOPHAGOGASTRODUODENOSCOPY);  Surgeon: Jaqui Shelton MD;  Location: [facility];  Service: Endoscopy;  Laterality: N/A;    ESOPHAGOGASTRODUODENOSCOPY N/A 1/10/2023    Procedure: ESOPHAGOGASTRODUODENOSCOPY (EGD);  Surgeon: Kiersten Chauhan MD;  Location: [facility] (2ND FLR);  Service: Endoscopy;  Laterality: N/A;  Endoflip/Endoscopically placed manometry probe  2nd floor-End stage achalasia  propofol only  full liquid diet x3 days, clear liquid diet x1 day prior to procedure  instructions [facility] to [facility]    ESOPHAGOGASTRODUODENOSCOPY N/A 4/17/2023    Procedure: EGD (ESOPHAGOGASTRODUODENOSCOPY);  Surgeon: Anabela Chance MD;  [facility];  Service: General;  Laterality: N/A;    LAPAROSCOPIC HELLER ESOPHAGEAL MYOTOMY N/A 4/17/2023    Procedure: ESOPHAGOMYOTOMY, LAPAROSCOPIC, HELLER (redo);  Surgeon: Anabela Chance MD;  [facility];  Service: General;  Laterality: N/A;    LAPAROSCOPIC LYSIS OF ADHESIONS  4/17/2023    Procedure: LYSIS, ADHESIONS, LAPAROSCOPIC;  Surgeon: Anabela Chance MD;  Location: [facility] OR 2ND FLR;  Service: General;;    LUNG SURGERY      age 12, partial pneumonectomy       Past Medical History:   Diagnosis Date    Achalasia     Esophageal diverticulum     Helicobacter pylori ab+     Hyperlipidemia     Pityriasis rosea        Review of Systems   Constitutional: Negative.    HENT:  Negative.     Eyes: Negative.    Respiratory: Negative.     Cardiovascular: Negative.    Gastrointestinal: Negative.    Endocrine: Negative.    Genitourinary: Negative.    Musculoskeletal: Negative.    Skin: Negative.         Random - about weekly- hives to face neck and back of neck   Allergic/Immunologic: Negative.    Neurological: Negative.    Hematological: Negative.    Psychiatric/Behavioral: Negative.            Medication List with Changes/Refills   New Medications    FAMOTIDINE (PEPCID) 20 MG TABLET    Take 1 tablet (20 mg total) by mouth 2 (two) times daily.   Current Medications    ATORVASTATIN (LIPITOR) 40 MG TABLET    Take 1 tablet (40 mg total) by mouth once daily.    AZITHROMYCIN (ZITHROMAX Z-LIZ) 250 MG TABLET    2 tablets today then one tablet daily for 4 days    CETIRIZINE (ZYRTEC) 10 MG TABLET    Take 10 mg by mouth once daily.    METHYLPREDNISOLONE (MEDROL, LIZ,) 4 MG TABLET    use as directed        Objective:   There were no vitals filed for this visit.    Physical Exam  Constitutional:       Appearance: Normal appearance.   Pulmonary:      Effort: Pulmonary effort is normal.   Neurological:      Mental Status: He is alert and oriented to person, place, and time.   Psychiatric:         Mood and Affect: Mood normal.         Behavior: Behavior normal.         Thought Content: Thought content normal.         Judgment: Judgment normal.         Assessment:     Problem List Items Addressed This Visit    None  Visit Diagnoses       Hives    -  Primary    Relevant Medications    famotidine (PEPCID) 20 MG tablet    Other Relevant Orders    Ambulatory referral/consult to Dermatology    Normocytic anemia        Relevant Orders    CBC Auto Differential    Comprehensive Metabolic Panel    Ferritin    Folate    Vitamin B12    Reticulocytes    Lactate Dehydrogenase    Iron and TIBC    C-Reactive Protein    Sedimentation rate    Elevated PSA        Relevant Orders    CBC Auto Differential    Comprehensive  Metabolic Panel    Nutritional anemia, unspecified        Relevant Orders    Folate    Vitamin B12              Lab Results   Component Value Date    WBC 7.41 12/30/2024    RBC 4.72 12/30/2024    HGB 13.0 (L) 12/30/2024    HCT 42.6 12/30/2024    MCV 90 12/30/2024    MCH 27.5 12/30/2024    MCHC 30.5 (L) 12/30/2024    RDW 13.9 12/30/2024     12/30/2024    MPV 9.4 12/30/2024    GRAN 4.4 12/30/2024    GRAN 59.6 12/30/2024    LYMPH 1.8 12/30/2024    LYMPH 24.0 12/30/2024    MONO 0.8 12/30/2024    MONO 10.3 12/30/2024    EOS 0.4 12/30/2024    BASO 0.04 12/30/2024    EOSINOPHIL 5.5 12/30/2024    BASOPHIL 0.5 12/30/2024      Lab Results   Component Value Date     12/30/2024    K 4.4 12/30/2024     12/30/2024    CO2 27 12/30/2024    BUN 9 12/30/2024    CREATININE 0.9 12/30/2024    CALCIUM 9.0 12/30/2024    ANIONGAP 6 (L) 12/30/2024    ESTGFRAFRICA >60.0 05/02/2022    EGFRNONAA >60.0 05/02/2022     Lab Results   Component Value Date    ALT 19 12/30/2024    AST 20 12/30/2024    ALKPHOS 88 12/30/2024    BILITOT 0.6 12/30/2024     Lab Results   Component Value Date    IRON 80 03/18/2024    TRANSFERRIN 205 03/18/2024    TIBC 303 03/18/2024    FESATURATED 26 03/18/2024    FERRITIN 165 03/18/2024      Lab Results   Component Value Date    [id number] 468 03/18/2024     Lab Results   Component Value Date    FOLATE 9.5 03/18/2024     Lab Results   Component Value Date    TSH 0.984 08/17/2023         Plan:   Hives  -     famotidine (PEPCID) 20 MG tablet; Take 1 tablet (20 mg total) by mouth 2 (two) times daily.  Dispense: 60 tablet; Refill: 1  -     Ambulatory referral/consult to Dermatology; Future; Expected date: 01/13/2025    Normocytic anemia  -     CBC Auto Differential; Future; Expected date: 01/06/2025  -     Comprehensive Metabolic Panel; Future; Expected date: 01/06/2025  -     Ferritin; Future; Expected date: 01/06/2025  -     Folate; Future; Expected date: 01/06/2025  -     Vitamin B12; Future; Expected  date: 01/06/2025  -     Reticulocytes; Future; Expected date: 01/06/2025  -     Lactate Dehydrogenase; Future; Expected date: 01/06/2025  -     Iron and TIBC; Future; Expected date: 01/06/2025  -     C-Reactive Protein; Future; Expected date: 01/06/2025  -     Sedimentation rate; Future; Expected date: 01/06/2025    Elevated PSA  -     CBC Auto Differential; Future; Expected date: 01/06/2025  -     Comprehensive Metabolic Panel; Future; Expected date: 01/06/2025    Nutritional anemia, unspecified  -     Folate; Future; Expected date: 01/06/2025  -     Vitamin B12; Future; Expected date: 01/06/2025          Med Onc Chart Routing      Follow up with physician    Follow up with SERGIO 3 months. VV with labs prior   Infusion scheduling note   n/a   Injection scheduling note n/a   Labs   Scheduling:  Preferred lab: [facility]  Lab interval:  cbc, cmp, iron studies, b12, folate, sed and crp, ldh, haptoglobin   Imaging   N/a   Pharmacy appointment No pharmacy appointment needed      Other referrals       Additional referrals needed  dermatology                Anemia has been very stable with no worsening anemia or additional cytopenias.  Will f/u in 12/2024 with repeat labs for continued  evaluation of labs to assess for worsening anemia or additional cytopenia.  Continue f/u with Dr. Sanchez for prostate cancer. Continue zytec daily.  Start pepcid 20 mg PO bid.  Refer dermatology.  With elevated psa/f/u with Dr. Sanchez/ prostate biopsy soon.  F/u in 3 months to eval anemia with additional labs.     Collaborating Provider:  Dr. Alexander Vasquez    Thank You,  Kymberly Gee, FNP-C  Benign Hematology

## 2025-01-07 ENCOUNTER — PATIENT MESSAGE (OUTPATIENT)
Dept: RESPIRATORY THERAPY | Facility: HOSPITAL | Age: 70
End: 2025-01-07
Payer: MEDICARE

## 2025-01-07 ENCOUNTER — ANESTHESIA EVENT (OUTPATIENT)
Dept: SURGERY | Facility: HOSPITAL | Age: 70
End: 2025-01-07
Payer: MEDICARE

## 2025-01-07 RX ORDER — ATORVASTATIN CALCIUM 40 MG/1
40 TABLET, FILM COATED ORAL DAILY
Qty: 90 TABLET | Refills: 1 | Status: SHIPPED | OUTPATIENT
Start: 2025-01-07

## 2025-01-07 NOTE — ANESTHESIA PREPROCEDURE EVALUATION
01/07/2025  Morris Parada is a 69 y.o., male.    Past Medical History:   Diagnosis Date    Achalasia     Esophageal diverticulum     Helicobacter pylori ab+     Hyperlipidemia     Pityriasis rosea      Past Surgical History:   Procedure Laterality Date    ESOPHAGEAL DILATION      ESOPHAGEAL MANOMETRY WITH MEASUREMENT OF IMPEDANCE N/A 1/10/2023    Procedure: MANOMETRY-ESOPHAGEAL-WITH IMPEDANCE;  Surgeon: Kiersten Chauhan MD;  Location: [facility] (2ND FLR);  Service: Endoscopy;  Laterality: N/A;    ESOPHAGEAL MOTILITY STUDY USING HIGH RESOLUTION MANOMETRY N/A 7/6/2022    Procedure: MOTILITY STUDY, ESOPHAGUS, USING HIGH RESOLUTION MANOMETRY;  Surgeon: Jaqui Shelton MD;  Location: [facility];  Service: Endoscopy;  Laterality: N/A;    ESOPHAGOGASTRODUODENOSCOPY N/A 07/24/2021    Procedure: EGD (ESOPHAGOGASTRODUODENOSCOPY);  Surgeon: Felipe Briscoe III, MD;  Location: [facility];  Service: Endoscopy;  Laterality: N/A;    ESOPHAGOGASTRODUODENOSCOPY N/A 7/6/2022    Procedure: EGD (ESOPHAGOGASTRODUODENOSCOPY);  Surgeon: Jaqui Shelton MD;  Location: [facility];  Service: Endoscopy;  Laterality: N/A;    ESOPHAGOGASTRODUODENOSCOPY N/A 1/10/2023    Procedure: ESOPHAGOGASTRODUODENOSCOPY (EGD);  Surgeon: Kiersten Chauhan MD;  [facility]  Service: Endoscopy;  Laterality: N/A;  Endoflip/Endoscopically placed manometry probe  2nd floor-End stage achalasia  propofol only  full liquid diet x3 days, clear liquid diet x1 day prior to procedure  instructions sent to [facility]    ESOPHAGOGASTRODUODENOSCOPY N/A 4/17/2023    Procedure: EGD (ESOPHAGOGASTRODUODENOSCOPY);  Surgeon: Anabela Chance MD;  Location: [facility] OR 2ND FLR;  Service: General;  Laterality: N/A;    LAPAROSCOPIC HELLER ESOPHAGEAL MYOTOMY N/A 4/17/2023    Procedure: ESOPHAGOMYOTOMY, LAPAROSCOPIC, HELLER (redo);   Surgeon: Anabela Chance MD;  Location: [facility] OR [address] [place] [address];  Service: General;  Laterality: N/A;    LAPAROSCOPIC LYSIS OF ADHESIONS  4/17/2023    Procedure: LYSIS, ADHESIONS, LAPAROSCOPIC;  Surgeon: Anabela Chance MD;  Location: [facility] OR [address] [place] [address];  Service: General;;    LUNG SURGERY      age 12, partial pneumonectomy       Pre-op Assessment    I have reviewed the Patient Summary Reports.    I have reviewed the NPO Status.   I have reviewed the Medications.     Review of Systems  Anesthesia Hx:  No problems with previous Anesthesia   History of prior surgery of interest to airway management or planning:            Denies Personal Hx of Anesthesia complications.                    Hematology/Oncology:                        --  Cancer in past history:                     Cardiovascular:                hyperlipidemia       Functional Capacity Can you climb two flights of stairs? ==> Yes                         Pulmonary:  Pulmonary Normal                       Renal/:  Renal/ Normal                 Hepatic/GI:     GERD                     ACHALASIA                                   Endocrine:  Endocrine Normal                Physical Exam  General: Alert    Airway:  Mallampati: I   Mouth Opening: Normal  TM Distance: Normal  Tongue: Normal  Neck ROM: Normal ROM    Dental:  Caps / Implants        Anesthesia Plan  Type of Anesthesia, risks & benefits discussed:    Anesthesia Type: Gen ETT, Gen Supraglottic Airway, Gen Natural Airway, MAC  Intra-op Monitoring Plan: Standard ASA Monitors  Post Op Pain Control Plan: multimodal analgesia and IV/PO Opioids PRN  Induction:  IV  Airway Plan: Direct  Informed Consent: Informed consent signed with the Patient and all parties understand the risks and agree with anesthesia plan.  All questions answered.   ASA Score: 2  Day of Surgery Review of History & Physical: H&P Update referred to the surgeon/provider.    Ready For Surgery From Anesthesia Perspective.      .

## 2025-01-07 NOTE — TELEPHONE ENCOUNTER
No care due was identified.  [facility] Embedded Care Due Messages. Reference number: [id number].   1/06/2025 8:21:59 PM CST

## 2025-01-07 NOTE — TELEPHONE ENCOUNTER
Refill Decision Note   Morris Parada  is requesting a refill authorization.    Brief Assessment and Rationale for Refill:   Approve       Medication Therapy Plan:         Comments:     Note composed:12:01 PM 01/07/2025

## 2025-01-08 ENCOUNTER — ANESTHESIA (OUTPATIENT)
Dept: SURGERY | Facility: HOSPITAL | Age: 70
End: 2025-01-08
Payer: MEDICARE

## 2025-01-08 ENCOUNTER — HOSPITAL ENCOUNTER (OUTPATIENT)
Facility: HOSPITAL | Age: 70
Discharge: HOME | End: 2025-01-08
Attending: UROLOGY | Admitting: UROLOGY
Payer: MEDICARE

## 2025-01-08 VITALS
HEIGHT: 70 IN | OXYGEN SATURATION: 100 % | TEMPERATURE: 97 F | WEIGHT: 174.06 LBS | RESPIRATION RATE: 20 BRPM | BODY MASS INDEX: 24.92 KG/M2 | SYSTOLIC BLOOD PRESSURE: 116 MMHG | HEART RATE: 66 BPM | DIASTOLIC BLOOD PRESSURE: 81 MMHG

## 2025-01-08 DIAGNOSIS — C61 PROSTATE CANCER: Primary | ICD-10-CM

## 2025-01-08 PROCEDURE — 37000008 HC ANESTHESIA 1ST 15 MINUTES: Mod: HCNC | Performed by: UROLOGY

## 2025-01-08 PROCEDURE — 88305 TISSUE EXAM BY PATHOLOGIST: CPT | Mod: 59,HCNC | Performed by: PATHOLOGY

## 2025-01-08 PROCEDURE — 63600175 PHARM REV CODE 636 W HCPCS: Mod: HCNC | Performed by: NURSE ANESTHETIST, CERTIFIED REGISTERED

## 2025-01-08 PROCEDURE — 71000015 HC POSTOP RECOV 1ST HR: Mod: HCNC | Performed by: UROLOGY

## 2025-01-08 PROCEDURE — 76942 ECHO GUIDE FOR BIOPSY: CPT | Mod: 26,,, | Performed by: UROLOGY

## 2025-01-08 PROCEDURE — G0416 PROSTATE BIOPSY, ANY MTHD: HCPCS | Mod: 26,HCNC,, | Performed by: PATHOLOGY

## 2025-01-08 PROCEDURE — 71000033 HC RECOVERY, INTIAL HOUR: Mod: HCNC | Performed by: UROLOGY

## 2025-01-08 PROCEDURE — 37000009 HC ANESTHESIA EA ADD 15 MINS: Mod: HCNC | Performed by: UROLOGY

## 2025-01-08 PROCEDURE — 36000705 HC OR TIME LEV I EA ADD 15 MIN: Mod: HCNC | Performed by: UROLOGY

## 2025-01-08 PROCEDURE — 55700 PR BIOPSY OF PROSTATE,NEEDLE/PUNCH: CPT | Mod: ,,, | Performed by: UROLOGY

## 2025-01-08 PROCEDURE — 36000704 HC OR TIME LEV I 1ST 15 MIN: Mod: HCNC | Performed by: UROLOGY

## 2025-01-08 PROCEDURE — 25000003 PHARM REV CODE 250: Mod: HCNC | Performed by: NURSE ANESTHETIST, CERTIFIED REGISTERED

## 2025-01-08 RX ORDER — ONDANSETRON HYDROCHLORIDE 2 MG/ML
4 INJECTION, SOLUTION INTRAVENOUS DAILY PRN
Status: DISCONTINUED | OUTPATIENT
Start: 2025-01-08 | End: 2025-01-08 | Stop reason: HOSPADM

## 2025-01-08 RX ORDER — MIDAZOLAM HYDROCHLORIDE 1 MG/ML
INJECTION INTRAMUSCULAR; INTRAVENOUS
Status: DISCONTINUED | OUTPATIENT
Start: 2025-01-08 | End: 2025-01-08

## 2025-01-08 RX ORDER — GLUCAGON 1 MG
1 KIT INJECTION
Status: DISCONTINUED | OUTPATIENT
Start: 2025-01-08 | End: 2025-01-08 | Stop reason: HOSPADM

## 2025-01-08 RX ORDER — FENTANYL CITRATE 50 UG/ML
INJECTION, SOLUTION INTRAMUSCULAR; INTRAVENOUS
Status: DISCONTINUED | OUTPATIENT
Start: 2025-01-08 | End: 2025-01-08

## 2025-01-08 RX ORDER — FENTANYL CITRATE 50 UG/ML
25 INJECTION, SOLUTION INTRAMUSCULAR; INTRAVENOUS EVERY 5 MIN PRN
Status: DISCONTINUED | OUTPATIENT
Start: 2025-01-08 | End: 2025-01-08 | Stop reason: HOSPADM

## 2025-01-08 RX ORDER — CEFTRIAXONE 1 G/1
INJECTION, POWDER, FOR SOLUTION INTRAMUSCULAR; INTRAVENOUS
Status: DISCONTINUED
Start: 2025-01-08 | End: 2025-01-08 | Stop reason: HOSPADM

## 2025-01-08 RX ORDER — MEPERIDINE HYDROCHLORIDE 25 MG/ML
12.5 INJECTION INTRAMUSCULAR; INTRAVENOUS; SUBCUTANEOUS ONCE AS NEEDED
Status: DISCONTINUED | OUTPATIENT
Start: 2025-01-08 | End: 2025-01-08 | Stop reason: HOSPADM

## 2025-01-08 RX ORDER — LIDOCAINE HYDROCHLORIDE 10 MG/ML
INJECTION, SOLUTION EPIDURAL; INFILTRATION; INTRACAUDAL; PERINEURAL
Status: DISCONTINUED
Start: 2025-01-08 | End: 2025-01-08 | Stop reason: HOSPADM

## 2025-01-08 RX ORDER — CIPROFLOXACIN 500 MG/1
500 TABLET ORAL 2 TIMES DAILY
Qty: 4 TABLET | Refills: 0 | Status: SHIPPED | OUTPATIENT
Start: 2025-01-08 | End: 2025-01-10

## 2025-01-08 RX ORDER — OXYCODONE AND ACETAMINOPHEN 5; 325 MG/1; MG/1
1 TABLET ORAL
Status: DISCONTINUED | OUTPATIENT
Start: 2025-01-08 | End: 2025-01-08 | Stop reason: HOSPADM

## 2025-01-08 RX ORDER — PROPOFOL 10 MG/ML
VIAL (ML) INTRAVENOUS
Status: DISCONTINUED | OUTPATIENT
Start: 2025-01-08 | End: 2025-01-08

## 2025-01-08 RX ORDER — LIDOCAINE HYDROCHLORIDE 20 MG/ML
INJECTION, SOLUTION EPIDURAL; INFILTRATION; INTRACAUDAL; PERINEURAL
Status: DISCONTINUED | OUTPATIENT
Start: 2025-01-08 | End: 2025-01-08

## 2025-01-08 RX ADMIN — CEFTRIAXONE SODIUM 1 G: 1 INJECTION, POWDER, FOR SOLUTION INTRAMUSCULAR; INTRAVENOUS at 12:01

## 2025-01-08 RX ADMIN — PROPOFOL 30 MG: 10 INJECTION, EMULSION INTRAVENOUS at 12:01

## 2025-01-08 RX ADMIN — SODIUM CHLORIDE: 9 INJECTION, SOLUTION INTRAVENOUS at 12:01

## 2025-01-08 RX ADMIN — MIDAZOLAM HYDROCHLORIDE 1 MG: 1 INJECTION, SOLUTION INTRAMUSCULAR; INTRAVENOUS at 12:01

## 2025-01-08 RX ADMIN — FENTANYL CITRATE 50 MCG: 50 INJECTION, SOLUTION INTRAMUSCULAR; INTRAVENOUS at 12:01

## 2025-01-08 RX ADMIN — LIDOCAINE HYDROCHLORIDE 60 MG: 20 INJECTION, SOLUTION EPIDURAL; INFILTRATION; INTRACAUDAL; PERINEURAL at 12:01

## 2025-01-08 RX ADMIN — PROPOFOL 60 MG: 10 INJECTION, EMULSION INTRAVENOUS at 12:01

## 2025-01-08 RX ADMIN — PROPOFOL 20 MG: 10 INJECTION, EMULSION INTRAVENOUS at 12:01

## 2025-01-08 NOTE — ANESTHESIA POSTPROCEDURE EVALUATION
Anesthesia Post Evaluation    Patient: Morris Parada    Procedure(s) Performed: Procedure(s) (LRB):  BIOPSY, WITH TRANSRECTAL US GUIDANCE (N/A)    Final Anesthesia Type: general      Patient location during evaluation: PACU  Patient participation: Yes- Able to Participate  Level of consciousness: awake  Post-procedure vital signs: reviewed and stable  Pain management: adequate  Airway patency: patent    PONV status at discharge: No PONV  Anesthetic complications: no      Cardiovascular status: stable  Respiratory status: unassisted  Hydration status: euvolemic  Follow-up not needed.              Vitals Value Taken Time   /81 01/08/25 1343   Temp 36.3 °C (97.3 °F) 01/08/25 1259   Pulse 67 01/08/25 1345   Resp 20 01/08/25 1345   SpO2 100 % 01/08/25 1344   Vitals shown include unfiled device data.      Event Time   Out of Recovery 13:30:00         Pain/Re Score: No data recorded         Begin ibuprofen as needed   Order given for PT

## 2025-01-08 NOTE — OP NOTE
Date:  01/08/2025    Surgeon:  Neal Sanchez MD    Pre operative diagnosis: low risk prostate cancer on AS    Post operative diagnosis: same    Surgery:    1) Transrectal Prostate Biopsy  2) Transrectal ultrasound of prostate  3) Ultrasound Guidance of Prostate Biopsy needles    Anesthesia:  Local MAC    Estimated blood loss:  2mL    Complications:  None.    Specimens:  Prostate biopsies for permanent specimen.    Procedure in details:  Risks and benefits of the surgery were explained to the patient prior to the surgery and the patient expressed understanding.  All questions were answered to the patient's apparent understanding and to the patient's apparent satisfaction.  Surgery consent was signed by the patient prior to the surgery.  The patient confirmed prior to the procedure that he did the bowel prep.  The patient was taken to the OR and placed in the supine position initially.  Anesthesia was administered by the Anesthesia team.  The patient was then placed in the left lateral decubitus position.  He was secured to the table and all pressure points were padded.  IV antibiotics were given to the patient prior to the surgery.  A timeout was done prior to the surgery.   The ultrasound with rectal probe was lubricated and advanced through the anus into the patient's rectum. A systematic 12 core prostate biopsies in a standard fashion obtaining 6 prostate biopsies on the right and 6 prostate biopsies on the left of the patient's base, mid and apex bilaterally. All the prostate biopsy samples were labeled and these were sent for permanent specimen.  The ultrasound probe was then removed.  The patient was then placed back in the supine position.  A post operative timeout was done at the end of the procedure.  The patient was taken to the recovery room in stable condition at the end of the procedure.       Neal Sanchez MD

## 2025-01-08 NOTE — TRANSFER OF CARE
"Anesthesia Transfer of Care Note    Patient: Morris Parada    Procedure(s) Performed: Procedure(s) (LRB):  BIOPSY, WITH TRANSRECTAL US GUIDANCE (N/A)    Patient location: PACU    Anesthesia Type: general    Transport from OR: Transported from OR on room air with adequate spontaneous ventilation    Post pain: adequate analgesia    Post assessment: tolerated procedure well and no apparent anesthetic complications    Post vital signs: stable    Level of consciousness: sedated    Nausea/Vomiting: no nausea/vomiting    Complications: none    Transfer of care protocol was followed      Last vitals: Visit Vitals  /84 (BP Location: Right arm, Patient Position: Sitting)   Pulse 66   Temp 36.3 °C (97.3 °F) (Temporal)   Resp 18   Ht 5' 10" (1.778 m)   Wt 78.9 kg (174 lb 0.9 oz)   SpO2 96%   BMI 24.97 kg/m²     "

## 2025-01-08 NOTE — DISCHARGE INSTRUCTIONS
Pain control with tylenol and motrin, no PRN narcotics. Patient can resume prior diet and activity level immediately, no other restrictions.   Blood per rectum, in urine, and in ejaculate are common.   Call office if youCall office if you have:  Temperature greater than 101 degrees or chills  Excessive bleeding/drainage from rectum or penis.  Persistent nausea/vomiting  Excessive pain not managed by pain medications

## 2025-01-08 NOTE — H&P
Chief Complaint:  Very low risk prostate cancer     HPI:   11/12/2024 - returns today for follow-up, no new issues in the interim, voiding well, PSA bumped a little to 8.2, no gross hematuria or dysuria     07/09/2024 - returns today for follow-up no new issues in the interim, voiding well, denies gross hematuria or dysuria     01/02/2024 - returns today for follow-up, no new issues in the interim, PSA stable at 6.2, notes that his granddaughter in [place] passed away from [facility] two weeks before Katina     09/26/2023 - returns today for follow-up, no new issues in the interim, voiding well, PSA 6.1     06/20/2023 - patient returns today for follow-up, no new issues in the interim, voiding well, no further pain after the antibiotics, PSA 6.4     03/10/2023 -  patient returns today for follow-up, no new issues, still has brown ejaculate and intermittent perineal discomfort as well as pain in his penis, thinks he might have an infection, no fevers, overall feeling well, no gross hematuria or dysuria, PSA 5.7     12/09/2022 - returns today for follow-up, no new issues in the interim, PSA down a little bit to 5.2, nocturia x1 and some mild urgency but overall not bothered enough that he wants to try medication, denies any gross hematuria or UTIs     09/09/2022 - patient returns today after biopsy, path showed less than 5 percent [place] 3 + 3 on right base, MRI negative for concerning lesions, prostate 65 grams, presents today for discussion     06/28/2022 - 67 yo male that presents for elevated PSA.  Patient had routine labs obtained which showed an elevated PSA to 5.8.  This was confirmed a month later at 6.2.  He denies any family history of  cancers.  Denies gross hematuria.  He does have a urinary issues with incomplete emptying, urgency, and weak stream but is not currently on any medications for his prostate.  He notes that he tends to hold his urine for longer than usual due to him being a  and  not being able to stop.  Denies any prior urologic procedures.  Also notes ED for the last two years, states that he has less desire than prior.  IPSS - 4/2/4/4/3/1/1 = 19 QOL - 4(mostly dissatisfied)     PMH:       Past Medical History:   Diagnosis Date    Achalasia      Esophageal diverticulum      Helicobacter pylori ab+      Hyperlipidemia      Pityriasis rosea           PSH:        Past Surgical History:   Procedure Laterality Date    ESOPHAGEAL DILATION        ESOPHAGEAL MANOMETRY WITH MEASUREMENT OF IMPEDANCE N/A 1/10/2023     Procedure: MANOMETRY-ESOPHAGEAL-WITH IMPEDANCE;  Surgeon: Kiersten Chauhan MD;  [facility]  Service: Endoscopy;  Laterality: N/A;    ESOPHAGEAL MOTILITY STUDY USING HIGH RESOLUTION MANOMETRY N/A 7/6/2022     Procedure: MOTILITY STUDY, ESOPHAGUS, USING HIGH RESOLUTION MANOMETRY;  Surgeon: Jaqui Shelton MD;  Location: [facility];  Service: Endoscopy;  Laterality: N/A;    ESOPHAGOGASTRODUODENOSCOPY N/A 07/24/2021     Procedure: EGD (ESOPHAGOGASTRODUODENOSCOPY);  Surgeon: Felipe Briscoe III, MD;  Location: [facility];  Service: Endoscopy;  Laterality: N/A;    ESOPHAGOGASTRODUODENOSCOPY N/A 7/6/2022     Procedure: EGD (ESOPHAGOGASTRODUODENOSCOPY);  Surgeon: Jaqui Shelton MD;  Location: [facility];  Service: Endoscopy;  Laterality: N/A;    ESOPHAGOGASTRODUODENOSCOPY N/A 1/10/2023     Procedure: ESOPHAGOGASTRODUODENOSCOPY (EGD);  Surgeon: Kiersten Chauhan MD;  [facility]  Service: Endoscopy;  Laterality: N/A;  Endoflip/Endoscopically placed manometry probe  2nd floor-End stage achalasia  propofol only  full liquid diet x3 days, clear liquid diet x1 day prior to procedure  instructions sent to [facility]    ESOPHAGOGASTRODUODENOSCOPY N/A 4/17/2023     Procedure: EGD (ESOPHAGOGASTRODUODENOSCOPY);  Surgeon: Anabela Chance MD;  Location: [facility] OR [facility] FLR;  Service: General;  Laterality: N/A;    LAPAROSCOPIC HELLER  ESOPHAGEAL MYOTOMY N/A 4/17/2023     Procedure: ESOPHAGOMYOTOMY, LAPAROSCOPIC, HELLER (redo);  Surgeon: Anabela Chance MD;  Location: [facility] OR [address] [place] [address];  Service: General;  Laterality: N/A;    LAPAROSCOPIC LYSIS OF ADHESIONS   4/17/2023     Procedure: LYSIS, ADHESIONS, LAPAROSCOPIC;  Surgeon: Anabela Chance MD;  [facility];  Service: General;;    LUNG SURGERY         age 12, partial pneumonectomy         Family History:         Family History   Problem Relation Name Age of Onset    Diabetes Mother        Heart disease Mother        Intracerebral hemorrhage Father        Hyperlipidemia Sister lubna      Hyperlipidemia Sister moe      Breast cancer Maternal Grandmother        Esophageal cancer Neg Hx        Stomach cancer Neg Hx        Colon cancer Neg Hx        Colon polyps Neg Hx        Rectal cancer Neg Hx        Liver cancer Neg Hx        Pancreatic cancer Neg Hx        Irritable bowel syndrome Neg Hx        Celiac disease Neg Hx        Crohn's disease Neg Hx             Social History:  Social History   Social History            Tobacco Use    Smoking status: Never       Passive exposure: Never    Smokeless tobacco: Never   Substance Use Topics    Alcohol use: Yes       Comment: rare    Drug use: No            Review of Systems:  General: No fever, chills  Skin: No rashes  Chest:  Denies cough and sputum production  Heart: Denies chest pain  Resp: Denies dyspnea  Abdomen: Denies diarrhea, abdominal pain, hematemesis, or blood in stool.  Musculoskeletal: No joint stiffness or swelling. Denies back pain.  : see HPI  Neuro: no dizziness or weakness     Allergies:  Patient has no known allergies.     Medications:    Current Medications      Current Outpatient Medications:     atorvastatin (LIPITOR) 40 MG tablet, Take 1 tablet (40 mg total) by mouth once daily., Disp: 90 tablet, Rfl: 0    azithromycin (ZITHROMAX Z-LIZ) 250 MG tablet, 2 tablets today then one tablet daily for 4  days, Disp: 6 tablet, Rfl: 0    cetirizine (ZYRTEC) 10 MG tablet, Take 10 mg by mouth once daily., Disp: , Rfl:     methylPREDNISolone (MEDROL, LIZ,) 4 mg tablet, use as directed, Disp: 1 each, Rfl: 0        Physical Exam:      Vitals:     11/12/24 1353   BP: 114/74   Pulse: 85      Body mass index is 25.88 kg/m².  General: awake, alert, cooperative  Head: NC/AT  Ears: external ears normal  Eyes: sclera normal  Lungs: normal inspiration, NAD  Heart: well-perfused  Abdomen: Soft, NT, ND   6/22: Normal circ'd phallus, meatus normal in size and position, BL testicles palpable, no masses, nontender, no abnormalities of epididymi  BOO 6/22: Normal rectal tone, no hemorrhoids. Prostate smooth and normal, no nodules 50 gm SV not palpable. Perineum and anus normal.  Lymphatic: groin nodes negative  Skin: The skin is warm and dry  Ext: No c/c/e.  Neuro: grossly intact, AOx3     RADIOLOGY:  MRI PROSTATE W W/O CONTRAST     CLINICAL HISTORY:  Prostate cancer, monitor;  Malignant neoplasm of prostate     TECHNIQUE:  TECHNIQUE: Multiparametric MRI of the prostate and pelvis performed on a 1.5 tamir scanner utilizing phase pelvic coil.     Sequences obtained:Sagittal, axial and coronal high resolution T2-WI with small field-of-view; Axial diffusion weighted images with multiple B-values and creation of ADC-maps; Dynamic contrast enhanced T1-weighted images through the prostate were also obtained before, during and after the administration of intravenous gadolinium.     CONTRAST: 8.2 cc of Gadolinium-based contrast media (contrast:Gadavist).     COMPARISON:  08/11/2022     FINDINGS:  Previous biopsy: Migel 6 right base 08/16/2022     PSA: 6.2 ng/mL 12/28/2023     Prior therapy: none     Prostate: The prostate measures 5.9 x 4.7 x 5.2cm corresponding to a computed volume of 67.8cc.     Peripheral zone: There is scattered foci of linear and wedge-shaped hypointense ADC and T2 signal seen throughout the peripheral zone  bilaterally.  No focal suspicious abnormality.     PI-RADS assessment category: 2     Transition zone:     TZ abnormalities: Benign prostatic hyperplasia without focal abnormality.     PI-RADS assessment category:2     Neurovascular bundle: Unremarkable.     Seminal vesicles:     SV invasion:Unremarkable     Adjacent Organ Involvement: There is no focal bladder wall thickening. There is no rectal involvement.     Lymphadenopathy: none     Other Findings: There is a small fat containing left inguinal hernia present.     Impression:     1. BPH with no focal suspicious abnormality demonstrated.  Overall Assessment:     LABS:  I personally reviewed the following lab values:        Lab Results   Component Value Date     WBC 5.93 07/30/2024     HGB 12.9 (L) 07/30/2024     HCT 40.8 07/30/2024      07/30/2024      07/30/2024     K 3.8 07/30/2024      07/30/2024     CREATININE 0.9 07/30/2024     BUN 9 07/30/2024     CO2 27 07/30/2024     TSH 0.984 08/17/2023     PSA 5.8 (H) 05/02/2022     CHOL 190 07/30/2024     TRIG 66 07/30/2024     HDL 54 07/30/2024     ALT 19 07/30/2024     AST 19 07/30/2024         Assessment/Plan:   Morris Parada is a 69 y.o. male with:     Very low risk prostate cancer - PSA stable, repeat MRI stable, PSA bumped a little to 8.2, to OR for confirmatory biopsy      ?Prostatitis - cleared with doxycycline x 30 days     BPH - not interested in medications currently     ED - PRN vialuis alfredo Sanchez MD  Urology

## 2025-01-08 NOTE — DISCHARGE SUMMARY
[facility] Peri Services  Discharge Note  Short Stay    Procedure(s) (LRB):  BIOPSY, WITH TRANSRECTAL US GUIDANCE (N/A)      OUTCOME: Patient tolerated treatment/procedure well without complication and is now ready for discharge.    DISPOSITION: Home or Self Care    FINAL DIAGNOSIS:  Prostate cancer    FOLLOWUP: In clinic    DISCHARGE INSTRUCTIONS:    Discharge Procedure Orders   Diet Adult Regular     Notify your health care provider if you experience any of the following:  temperature >100.4     Notify your health care provider if you experience any of the following:  persistent nausea and vomiting or diarrhea     Notify your health care provider if you experience any of the following:  severe uncontrolled pain     Notify your health care provider if you experience any of the following:  difficulty breathing or increased cough     Notify your health care provider if you experience any of the following:  severe persistent headache     Notify your health care provider if you experience any of the following:  worsening rash     Notify your health care provider if you experience any of the following:  persistent dizziness, light-headedness, or visual disturbances     Notify your health care provider if you experience any of the following:  increased confusion or weakness     Activity as tolerated

## 2025-01-10 LAB
FINAL PATHOLOGIC DIAGNOSIS: NORMAL
GROSS: NORMAL
Lab: NORMAL

## 2025-02-22 DIAGNOSIS — Z00.00 ENCOUNTER FOR MEDICARE ANNUAL WELLNESS EXAM: ICD-10-CM

## 2025-04-01 ENCOUNTER — OFFICE VISIT (OUTPATIENT)
Dept: DERMATOLOGY | Facility: CLINIC | Age: 70
End: 2025-04-01
Payer: MEDICARE

## 2025-04-01 ENCOUNTER — TELEPHONE (OUTPATIENT)
Dept: DERMATOLOGY | Facility: CLINIC | Age: 70
End: 2025-04-01

## 2025-04-01 ENCOUNTER — LAB VISIT (OUTPATIENT)
Dept: LAB | Facility: HOSPITAL | Age: 70
End: 2025-04-01
Attending: NURSE PRACTITIONER
Payer: MEDICARE

## 2025-04-01 DIAGNOSIS — L30.9 DERMATITIS: Primary | ICD-10-CM

## 2025-04-01 DIAGNOSIS — L50.9 HIVES: ICD-10-CM

## 2025-04-01 DIAGNOSIS — D53.9 NUTRITIONAL ANEMIA, UNSPECIFIED: ICD-10-CM

## 2025-04-01 DIAGNOSIS — D64.9 NORMOCYTIC ANEMIA: ICD-10-CM

## 2025-04-01 DIAGNOSIS — R97.20 ELEVATED PSA: ICD-10-CM

## 2025-04-01 LAB
ABSOLUTE EOSINOPHIL (OHS): 0.42 K/UL
ABSOLUTE MONOCYTE (OHS): 0.52 K/UL (ref 0.3–1)
ABSOLUTE NEUTROPHIL COUNT (OHS): 3.37 K/UL (ref 1.8–7.7)
ALBUMIN SERPL BCP-MCNC: 4 G/DL (ref 3.5–5.2)
ALP SERPL-CCNC: 96 UNIT/L (ref 40–150)
ALT SERPL W/O P-5'-P-CCNC: 19 UNIT/L (ref 10–44)
ANION GAP (OHS): 10 MMOL/L (ref 8–16)
AST SERPL-CCNC: 20 UNIT/L (ref 11–45)
BASOPHILS # BLD AUTO: 0.04 K/UL
BASOPHILS NFR BLD AUTO: 0.7 %
BILIRUB SERPL-MCNC: 0.6 MG/DL (ref 0.1–1)
BUN SERPL-MCNC: 10 MG/DL (ref 8–23)
CALCIUM SERPL-MCNC: 9.1 MG/DL (ref 8.7–10.5)
CHLORIDE SERPL-SCNC: 105 MMOL/L (ref 95–110)
CO2 SERPL-SCNC: 25 MMOL/L (ref 23–29)
CREAT SERPL-MCNC: 0.9 MG/DL (ref 0.5–1.4)
CRP SERPL-MCNC: 0.8 MG/L
ERYTHROCYTE [DISTWIDTH] IN BLOOD BY AUTOMATED COUNT: 13.4 % (ref 11.5–14.5)
ERYTHROCYTE [SEDIMENTATION RATE] IN BLOOD: 16 MM/HR
FERRITIN SERPL-MCNC: 177.3 NG/ML (ref 20–300)
FOLATE SERPL-MCNC: 8.5 NG/ML (ref 4–24)
GFR SERPLBLD CREATININE-BSD FMLA CKD-EPI: >60 ML/MIN/1.73/M2
GLUCOSE SERPL-MCNC: 101 MG/DL (ref 70–110)
HAPTOGLOB SERPL-MCNC: 57 MG/DL (ref 30–250)
HCT VFR BLD AUTO: 45.6 % (ref 40–54)
HGB BLD-MCNC: 14.7 GM/DL (ref 14–18)
IMM GRANULOCYTES # BLD AUTO: 0.02 K/UL (ref 0–0.04)
IMM GRANULOCYTES NFR BLD AUTO: 0.3 % (ref 0–0.5)
IRON SATN MFR SERPL: 25 % (ref 20–50)
IRON SERPL-MCNC: 76 UG/DL (ref 45–160)
LDH SERPL-CCNC: 207 U/L (ref 110–260)
LYMPHOCYTES # BLD AUTO: 1.6 K/UL (ref 1–4.8)
MCH RBC QN AUTO: 28.3 PG (ref 27–31)
MCHC RBC AUTO-ENTMCNC: 32.2 G/DL (ref 32–36)
MCV RBC AUTO: 88 FL (ref 82–98)
NUCLEATED RBC (/100WBC) (OHS): 0 /100 WBC
PLATELET # BLD AUTO: 183 K/UL (ref 150–450)
PMV BLD AUTO: 9.3 FL (ref 9.2–12.9)
POTASSIUM SERPL-SCNC: 4.2 MMOL/L (ref 3.5–5.1)
PROT SERPL-MCNC: 7.6 GM/DL (ref 6–8.4)
RBC # BLD AUTO: 5.19 M/UL (ref 4.6–6.2)
RELATIVE EOSINOPHIL (OHS): 7 %
RELATIVE LYMPHOCYTE (OHS): 26.8 % (ref 18–48)
RELATIVE MONOCYTE (OHS): 8.7 % (ref 4–15)
RELATIVE NEUTROPHIL (OHS): 56.5 % (ref 38–73)
RETICS/RBC NFR AUTO: 0.9 % (ref 0.4–2)
SODIUM SERPL-SCNC: 140 MMOL/L (ref 136–145)
TIBC SERPL-MCNC: 305 UG/DL (ref 250–450)
TRANSFERRIN SERPL-MCNC: 206 MG/DL (ref 200–375)
VIT B12 SERPL-MCNC: 479 PG/ML (ref 210–950)
WBC # BLD AUTO: 5.97 K/UL (ref 3.9–12.7)

## 2025-04-01 PROCEDURE — 85652 RBC SED RATE AUTOMATED: CPT | Mod: HCNC

## 2025-04-01 PROCEDURE — 86140 C-REACTIVE PROTEIN: CPT | Mod: HCNC

## 2025-04-01 PROCEDURE — 85045 AUTOMATED RETICULOCYTE COUNT: CPT | Mod: HCNC

## 2025-04-01 PROCEDURE — 85025 COMPLETE CBC W/AUTO DIFF WBC: CPT | Mod: HCNC

## 2025-04-01 PROCEDURE — 82728 ASSAY OF FERRITIN: CPT | Mod: HCNC

## 2025-04-01 PROCEDURE — 83010 ASSAY OF HAPTOGLOBIN QUANT: CPT | Mod: HCNC

## 2025-04-01 PROCEDURE — 36415 COLL VENOUS BLD VENIPUNCTURE: CPT | Mod: HCNC

## 2025-04-01 PROCEDURE — 82746 ASSAY OF FOLIC ACID SERUM: CPT | Mod: HCNC

## 2025-04-01 PROCEDURE — 83615 LACTATE (LD) (LDH) ENZYME: CPT | Mod: HCNC

## 2025-04-01 PROCEDURE — 82040 ASSAY OF SERUM ALBUMIN: CPT | Mod: HCNC

## 2025-04-01 PROCEDURE — 82607 VITAMIN B-12: CPT | Mod: HCNC

## 2025-04-01 PROCEDURE — 84466 ASSAY OF TRANSFERRIN: CPT | Mod: HCNC

## 2025-04-01 RX ORDER — MOMETASONE FUROATE 1 MG/G
CREAM TOPICAL DAILY
Qty: 45 G | Refills: 1 | Status: SHIPPED | OUTPATIENT
Start: 2025-04-01

## 2025-04-01 RX ORDER — DOXYCYCLINE HYCLATE 100 MG
100 TABLET ORAL DAILY
Qty: 90 TABLET | Refills: 0 | Status: SHIPPED | OUTPATIENT
Start: 2025-04-01

## 2025-04-01 NOTE — TELEPHONE ENCOUNTER
Outreach made to patient regarding scheduling in person follow up appt however no answer, left message. Appt scheduled for Thursday, 7/3/25 at 10:15 am with Dr. Akhtar at the [place] location.     ----- Message from Melvin Akhtar MD sent at 4/1/2025  2:14 PM CDT -----  Can we please schedule patient an in office visit for evaluation of a rash (was seen today via virtual visit and needs in office visit). Thanks.

## 2025-04-01 NOTE — PROGRESS NOTES
The patient location is: home/[place]  The chief complaint leading to consultation is: rash    Visit type: audiovisual    Face to Face time with patient: 10mins  10 minutes of total time spent on the encounter, which includes face to face time and non-face to face time preparing to see the patient (eg, review of tests), Obtaining and/or reviewing separately obtained history, Documenting clinical information in the electronic or other health record, Independently interpreting results (not separately reported) and communicating results to the patient/family/caregiver, or Care coordination (not separately reported).         Each patient to whom he or she provides medical services by telemedicine is:  (1) informed of the relationship between the physician and patient and the respective role of any other health care provider with respect to management of the patient; and (2) notified that he or she may decline to receive medical services by telemedicine and may withdraw from such care at any time.    History of Present Illness: The patient presents with chief complaint of persistent rash and itching.  Location: mostly on the face/neck but also in other areas on the body  Duration: ongoing for months  Signs/Symptoms: persistent discolored, very itchy rashes on the body that then heal with discoloration  Prior treatments: has tried topical bendaryl and cortisone..       ROS: Otherwise negative    PE: const/neuro - AAOx3, NAD           Skin -         A/P: 1) Dermatitis - recommend in office visit for closer evaluation and potential biopsy. For now, start topical steroids and doxycycline to help with inflammation present.   -     mometasone 0.1% (ELOCON) 0.1 % cream; Apply topically once daily.  Dispense: 45 g; Refill: 1  -     doxycycline (VIBRA-TABS) 100 MG tablet; Take 1 tablet (100 mg total) by mouth once daily.  Dispense: 90 tablet; Refill: 0

## 2025-04-02 ENCOUNTER — TELEPHONE (OUTPATIENT)
Dept: DERMATOLOGY | Facility: CLINIC | Age: 70
End: 2025-04-02
Payer: MEDICARE

## 2025-04-02 NOTE — TELEPHONE ENCOUNTER
Return call made to patient regarding scheduling in person follow up appt. Advised patient that I scheduled appt for Thursday, 7/3/25 at 10:15 am with Dr. Akhtar at the [place] location. Patient agreed on appt.           ----- Message from Karla sent at 4/2/2025 12:37 PM CDT -----  Contact: self  Type:  Patient Returning CallWho Called: PtWho Left Message for Patient: Don VINSON Does the patient know what this is regarding?: F/U appt w/Dr. AkhtarWould the patient rather a call back or a response via [facility]? [employer] Call Back Number: [phone] return call to pt... Thank you...

## 2025-04-07 ENCOUNTER — OFFICE VISIT (OUTPATIENT)
Dept: HEMATOLOGY/ONCOLOGY | Facility: CLINIC | Age: 70
End: 2025-04-07
Payer: MEDICARE

## 2025-04-07 DIAGNOSIS — R97.20 ELEVATED PSA: Primary | ICD-10-CM

## 2025-04-07 DIAGNOSIS — Z86.2 HISTORY OF ANEMIA: ICD-10-CM

## 2025-04-07 PROCEDURE — 1159F MED LIST DOCD IN RCRD: CPT | Mod: HCNC,CPTII,95, | Performed by: NURSE PRACTITIONER

## 2025-04-07 PROCEDURE — 1160F RVW MEDS BY RX/DR IN RCRD: CPT | Mod: HCNC,CPTII,95, | Performed by: NURSE PRACTITIONER

## 2025-04-07 PROCEDURE — 98006 SYNCH AUDIO-VIDEO EST MOD 30: CPT | Mod: HCNC,95,, | Performed by: NURSE PRACTITIONER

## 2025-04-07 PROCEDURE — G2211 COMPLEX E/M VISIT ADD ON: HCPCS | Mod: HCNC,95,, | Performed by: NURSE PRACTITIONER

## 2025-04-07 NOTE — PROGRESS NOTES
The patient location is: home  The chief complaint leading to consultation is: no complaints    Visit type: audiovisual    Face to Face time with patient: 15  30 minutes of total time spent on the encounter, which includes face to face time and non-face to face time preparing to see the patient (eg, review of tests), Obtaining and/or reviewing separately obtained history, Documenting clinical information in the electronic or other health record, Independently interpreting results (not separately reported) and communicating results to the patient/family/caregiver, or Care coordination (not separately reported).      Each patient to whom he or she provides medical services by telemedicine is:  (1) informed of the relationship between the physician and patient and the respective role of any other health care provider with respect to management of the patient; and (2) notified that he or she may decline to receive medical services by telemedicine and may withdraw from such care at any time.      Patient ID: Morris Parada is a 69 y.o. male.    Chief Complaint: no complaints    HPI:   69 year old male who presents to the hematology department as a new patient for further evaluation and management of chronic anemia.  He has been referred to the hematology department by his PCP, Dr. Miller.       Noted chronic normocytic anemia with hgb ranging from 12-13.8.  Iron studies normal.  No monoclonal proteins.      With elevated psa and prostate cancer proven biopsy Prostate, right base, biopsy 8/16/2022:   - Small focus of prostate adenocarcinoma, [place] grade 3 + 3 = 6, involving   <5% total of 1/2 cores   - Perineural invasion not identified   Being followed by Dr. Sanchez     Had recent esophageal surgery - heller myotomy and had stretched esophagus and is currently having sharp pain in area where there is scar tissue.  He is going to be seeing GI Dr. Kiersten Chauhan soon to discuss.       States that his sister has sickle cell.        Has occasional glass of wine.  Denies cigarette smoking.  Works as a .      Family hx of cancer:  Self: prostate cancer     Has a history of H. Pylori.  Has a h/o esophageal reflux and now diagnosed with achalasia.        Denies f/c/ns or unintentional weight loss.  Denies any known abnormal lymphadenopathy.      Up to date with age appropriate cancer screenings     Interval History:   9/7/2023 Presents today to discuss workup for chronic anemia.  Is following Dr. Sanchez every 3 months for PSA checks.  States that he does not have any symptoms of anemia.      Interval History:  3/25/2024 Presents today to review labs with chronic anemia and know prostate cancer with elevated psa.  Hgb stable at 13.2 normocytic.  Negative alph, beta,thalassemia or sickle cell trait.  No iron deficiency or B12 deficiency    Interval history:  1/6/2024  States that he has broken out in whelps about once a week - symptoms start with burning, then whelps, then itching, then whelps burst and causes hyperpigmentation to face, neck and back of neck.  States that he takes zyrtec just about every day for his sinuses.  He states that he does not have a dermatologist.  Hgb reduced from prior at 13 g/dL.  With elevated psa followed Dr. Sanchez is to have a biopsy on Wednesday 1/8/2025.  Hgb 13 g/dL. No nutritional deficiencies found.  Kidney function good.  No abnormal proteins found in blood.     Interval History:  4/7/2025  Presents today for evaluation of labs with h/o anemia.  Labs have normalized and he is currently w/out anemia.  He has no complaints today. All workup done was normal.  Noted prostate biopsy was negative for malignancy.      I have reviewed all of the patient's relevant lab work available in the medical record and have utilized this in my evaluation and management recommendations today.      Social History     Socioeconomic History    Marital status:     Number of children: 3   Occupational History      Employer: [employer]    Occupation:  Delmy   Tobacco Use    Smoking status: Never     Passive exposure: Never    Smokeless tobacco: Never   Substance and Sexual Activity    Alcohol use: Yes     Comment: rare    Drug use: Never    Sexual activity: Yes     Partners: Female   Social History Narrative    ** Merged History Encounter **          Social Drivers of Health     Financial Resource Strain: Low Risk  (9/25/2024)    Overall Financial Resource Strain (CARDIA)     Difficulty of Paying Living Expenses: Not hard at all   Food Insecurity: No Food Insecurity (9/25/2024)    Hunger Vital Sign     Worried About Running Out of Food in the Last Year: Never true     Ran Out of Food in the Last Year: Never true   Transportation Needs: No Transportation Needs (4/17/2023)    PRAPARE - Transportation     Lack of Transportation (Medical): No     Lack of Transportation (Non-Medical): No   Physical Activity: Insufficiently Active (9/25/2024)    Exercise Vital Sign     Days of Exercise per Week: 2 days     Minutes of Exercise per Session: 30 min   Stress: No Stress Concern Present (9/25/2024)    [employer] [place] [employer] - Occupational Stress Questionnaire     Feeling of Stress : Only a little   Housing Stability: Low Risk  (4/17/2023)    Housing Stability Vital Sign     Unable to Pay for Housing in the Last Year: No     Number of Places Lived in the Last Year: 1     Unstable Housing in the Last Year: No       Family History   Problem Relation Name Age of Onset    Diabetes Mother      Heart disease Mother      Intracerebral hemorrhage Father      Hyperlipidemia Sister lubna     Hyperlipidemia Sister moe     Breast cancer Maternal Grandmother      Esophageal cancer Neg Hx      Stomach cancer Neg Hx      Colon cancer Neg Hx      Colon polyps Neg Hx      Rectal cancer Neg Hx      Liver cancer Neg Hx      Pancreatic cancer Neg Hx      Irritable bowel syndrome Neg Hx      Celiac disease Neg Hx      Crohn's  disease Neg Hx         Past Surgical History:   Procedure Laterality Date    BIOPSY WITH TRANSRECTAL ULTRASOUND (TRUS) GUIDANCE N/A 1/8/2025    Procedure: BIOPSY, WITH TRANSRECTAL US GUIDANCE;  Surgeon: Neal Sanchez MD;  Location: [facility];  Service: Urology;  Laterality: N/A;    ESOPHAGEAL DILATION      ESOPHAGEAL MANOMETRY WITH MEASUREMENT OF IMPEDANCE N/A 1/10/2023    Procedure: MANOMETRY-ESOPHAGEAL-WITH IMPEDANCE;  Surgeon: Kiersten Chauhan MD;  Location: [facility] (2ND FLR);  Service: Endoscopy;  Laterality: N/A;    ESOPHAGEAL MOTILITY STUDY USING HIGH RESOLUTION MANOMETRY N/A 7/6/2022    Procedure: MOTILITY STUDY, ESOPHAGUS, USING HIGH RESOLUTION MANOMETRY;  Surgeon: Jaqui Shelton MD;  Location: [facility];  Service: Endoscopy;  Laterality: N/A;    ESOPHAGOGASTRODUODENOSCOPY N/A 07/24/2021    Procedure: EGD (ESOPHAGOGASTRODUODENOSCOPY);  Surgeon: Felipe Briscoe III, MD;  Location: [facility];  Service: Endoscopy;  Laterality: N/A;    ESOPHAGOGASTRODUODENOSCOPY N/A 7/6/2022    Procedure: EGD (ESOPHAGOGASTRODUODENOSCOPY);  Surgeon: Jaqui Shelton MD;  Location: [facility];  Service: Endoscopy;  Laterality: N/A;    ESOPHAGOGASTRODUODENOSCOPY N/A 1/10/2023    Procedure: ESOPHAGOGASTRODUODENOSCOPY (EGD);  Surgeon: Kiersten Chauhan MD;  Location: [facility] (2ND FLR);  Service: Endoscopy;  Laterality: N/A;  Endoflip/Endoscopically placed manometry probe  2nd floor-End stage achalasia  propofol only  full liquid diet x3 days, clear liquid diet x1 day prior to procedure  instructions sent to [facility]    ESOPHAGOGASTRODUODENOSCOPY N/A 4/17/2023    Procedure: EGD (ESOPHAGOGASTRODUODENOSCOPY);  Surgeon: Anabela Chance MD;  Location: [facility] 2ND FLR;  Service: General;  Laterality: N/A;    LAPAROSCOPIC HELLER ESOPHAGEAL MYOTOMY N/A 4/17/2023    Procedure: ESOPHAGOMYOTOMY, LAPAROSCOPIC, HELLER (redo);  Surgeon: Anabela Chance MD;  [facility];   Service: General;  Laterality: N/A;    LAPAROSCOPIC LYSIS OF ADHESIONS  4/17/2023    Procedure: LYSIS, ADHESIONS, LAPAROSCOPIC;  Surgeon: Anabela Chance MD;  Location: [facility] OR [address] [place] [address];  Service: General;;    LUNG SURGERY      age 12, partial pneumonectomy       Past Medical History:   Diagnosis Date    Achalasia     Esophageal diverticulum     Helicobacter pylori ab+     Hyperlipidemia     Pityriasis rosea        Review of Systems   Constitutional: Negative.    HENT: Negative.     Eyes: Negative.    Respiratory: Negative.     Cardiovascular: Negative.    Gastrointestinal: Negative.    Endocrine: Negative.    Genitourinary: Negative.    Musculoskeletal: Negative.    Skin: Negative.    Allergic/Immunologic: Negative.    Neurological: Negative.    Hematological: Negative.    Psychiatric/Behavioral: Negative.            Medication List with Changes/Refills   Current Medications    ATORVASTATIN (LIPITOR) 40 MG TABLET    Take 1 tablet (40 mg total) by mouth once daily.    CETIRIZINE (ZYRTEC) 10 MG TABLET    Take 10 mg by mouth once daily.    DOXYCYCLINE (VIBRA-TABS) 100 MG TABLET    Take 1 tablet (100 mg total) by mouth once daily.    FAMOTIDINE (PEPCID) 20 MG TABLET    Take 1 tablet (20 mg total) by mouth 2 (two) times daily.    MOMETASONE 0.1% (ELOCON) 0.1 % CREAM    Apply topically once daily.        Objective:   There were no vitals filed for this visit.    Physical Exam  Constitutional:       Appearance: Normal appearance.   Pulmonary:      Effort: Pulmonary effort is normal.   Neurological:      Mental Status: He is alert and oriented to person, place, and time.   Psychiatric:         Mood and Affect: Mood normal.         Behavior: Behavior normal.         Thought Content: Thought content normal.         Judgment: Judgment normal.         Assessment:     Problem List Items Addressed This Visit    None  Visit Diagnoses         Elevated PSA    -  Primary      History of anemia                    Lab Results    Component Value Date    WBC 5.97 04/01/2025    RBC 5.19 04/01/2025    HGB 14.7 04/01/2025    HCT 45.6 04/01/2025    MCV 88 04/01/2025    MCH 28.3 04/01/2025    MCHC 32.2 04/01/2025    RDW 13.4 04/01/2025     04/01/2025    MPV 9.3 04/01/2025    GRAN 4.4 12/30/2024    GRAN 59.6 12/30/2024    LYMPH 26.8 04/01/2025    LYMPH 1.6 04/01/2025    MONO 8.7 04/01/2025    MONO 0.52 04/01/2025    EOS 7.0 04/01/2025    EOS 0.42 04/01/2025    BASO 0.04 12/30/2024    EOSINOPHIL 5.5 12/30/2024    BASOPHIL 0.7 04/01/2025    BASOPHIL 0.04 04/01/2025      Lab Results   Component Value Date     04/01/2025    K 4.2 04/01/2025     04/01/2025    CO2 25 04/01/2025    BUN 10 04/01/2025    CREATININE 0.9 04/01/2025    CALCIUM 9.1 04/01/2025    ANIONGAP 10 04/01/2025    ESTGFRAFRICA >60.0 05/02/2022    EGFRNONAA >60.0 05/02/2022     Lab Results   Component Value Date    ALT 19 04/01/2025    AST 20 04/01/2025    ALKPHOS 96 04/01/2025    BILITOT 0.6 04/01/2025     Lab Results   Component Value Date    IRON 76 04/01/2025    TRANSFERRIN 206 04/01/2025    TIBC 305 04/01/2025    FESATURATED 26 03/18/2024    FERRITIN 177.3 04/01/2025      Lab Results   Component Value Date    ORFNCETG29 479 04/01/2025     Lab Results   Component Value Date    FOLATE 8.5 04/01/2025     Lab Results   Component Value Date    TSH 0.984 08/17/2023         Plan:   Elevated PSA    History of anemia            Med Onc Chart Routing      Follow up with physician    Follow up with SERGIO No follow up needed. n/a   Infusion scheduling note   n/a   Injection scheduling note n/a   Labs   Scheduling:  Preferred lab:  Lab interval:  N/a   Imaging   N/a   Pharmacy appointment No pharmacy appointment needed      Other referrals       No additional referrals needed  n/a          Anemia has resolved and workup has been unremarkable.  Discuss that he can f/u with his pcp and f/u with me in the future if needed if anemia or other cytopenias develop.  He will be  considered an established patient within out department for the next three years.      Collaborating Provider:  Dr. Alexander Vasquez    Thank You,  THAI Boss-C  Benign Hematology

## 2025-06-27 ENCOUNTER — TELEPHONE (OUTPATIENT)
Dept: PHARMACY | Facility: CLINIC | Age: 70
End: 2025-06-27
Payer: MEDICARE

## 2025-07-03 ENCOUNTER — LAB VISIT (OUTPATIENT)
Dept: LAB | Facility: HOSPITAL | Age: 70
End: 2025-07-03
Attending: STUDENT IN AN ORGANIZED HEALTH CARE EDUCATION/TRAINING PROGRAM
Payer: MEDICARE

## 2025-07-03 ENCOUNTER — OFFICE VISIT (OUTPATIENT)
Dept: DERMATOLOGY | Facility: CLINIC | Age: 70
End: 2025-07-03
Payer: MEDICARE

## 2025-07-03 VITALS
HEART RATE: 66 BPM | WEIGHT: 173.94 LBS | SYSTOLIC BLOOD PRESSURE: 116 MMHG | HEIGHT: 70 IN | BODY MASS INDEX: 24.9 KG/M2 | DIASTOLIC BLOOD PRESSURE: 61 MMHG

## 2025-07-03 DIAGNOSIS — L30.9 DERMATITIS: ICD-10-CM

## 2025-07-03 DIAGNOSIS — L30.9 DERMATITIS: Primary | ICD-10-CM

## 2025-07-03 PROCEDURE — 1126F AMNT PAIN NOTED NONE PRSNT: CPT | Mod: CPTII,HCNC,S$GLB, | Performed by: STUDENT IN AN ORGANIZED HEALTH CARE EDUCATION/TRAINING PROGRAM

## 2025-07-03 PROCEDURE — 99999 PR PBB SHADOW E&M-EST. PATIENT-LVL IV: CPT | Mod: PBBFAC,HCNC,, | Performed by: STUDENT IN AN ORGANIZED HEALTH CARE EDUCATION/TRAINING PROGRAM

## 2025-07-03 PROCEDURE — 11104 PUNCH BX SKIN SINGLE LESION: CPT | Mod: HCNC,S$GLB,, | Performed by: STUDENT IN AN ORGANIZED HEALTH CARE EDUCATION/TRAINING PROGRAM

## 2025-07-03 PROCEDURE — 1101F PT FALLS ASSESS-DOCD LE1/YR: CPT | Mod: CPTII,HCNC,S$GLB, | Performed by: STUDENT IN AN ORGANIZED HEALTH CARE EDUCATION/TRAINING PROGRAM

## 2025-07-03 PROCEDURE — 3078F DIAST BP <80 MM HG: CPT | Mod: CPTII,HCNC,S$GLB, | Performed by: STUDENT IN AN ORGANIZED HEALTH CARE EDUCATION/TRAINING PROGRAM

## 2025-07-03 PROCEDURE — 99213 OFFICE O/P EST LOW 20 MIN: CPT | Mod: 25,HCNC,S$GLB, | Performed by: STUDENT IN AN ORGANIZED HEALTH CARE EDUCATION/TRAINING PROGRAM

## 2025-07-03 PROCEDURE — 1159F MED LIST DOCD IN RCRD: CPT | Mod: CPTII,HCNC,S$GLB, | Performed by: STUDENT IN AN ORGANIZED HEALTH CARE EDUCATION/TRAINING PROGRAM

## 2025-07-03 PROCEDURE — 3008F BODY MASS INDEX DOCD: CPT | Mod: CPTII,HCNC,S$GLB, | Performed by: STUDENT IN AN ORGANIZED HEALTH CARE EDUCATION/TRAINING PROGRAM

## 2025-07-03 PROCEDURE — 3288F FALL RISK ASSESSMENT DOCD: CPT | Mod: CPTII,HCNC,S$GLB, | Performed by: STUDENT IN AN ORGANIZED HEALTH CARE EDUCATION/TRAINING PROGRAM

## 2025-07-03 PROCEDURE — 86038 ANTINUCLEAR ANTIBODIES: CPT | Mod: HCNC

## 2025-07-03 PROCEDURE — 1160F RVW MEDS BY RX/DR IN RCRD: CPT | Mod: CPTII,HCNC,S$GLB, | Performed by: STUDENT IN AN ORGANIZED HEALTH CARE EDUCATION/TRAINING PROGRAM

## 2025-07-03 PROCEDURE — 36415 COLL VENOUS BLD VENIPUNCTURE: CPT | Mod: HCNC

## 2025-07-03 PROCEDURE — 3074F SYST BP LT 130 MM HG: CPT | Mod: CPTII,HCNC,S$GLB, | Performed by: STUDENT IN AN ORGANIZED HEALTH CARE EDUCATION/TRAINING PROGRAM

## 2025-07-03 NOTE — PROGRESS NOTES
Subjective:       Patient ID:  Morris Parada is a 69 y.o. male who presents for No chief complaint on file.    History of Present Illness: The patient presents for follow up of a rash on the face, last seen on a virtual visit on 4/1/25, started on mometasone and doxycycline. Reports that symptoms have gotten better and are less itchy/bothersome. However, he continues to have significant rash on the face and back of the neck that is itchy and irritated. Here for biopsy.           Review of Systems   Constitutional:  Negative for fever and chills.   Skin:  Positive for rash.        Objective:    Physical Exam   Constitutional: He appears well-developed and well-nourished. No distress.   Neurological: He is alert and oriented to person, place, and time. He is not disoriented.   Psychiatric: He has a normal mood and affect.   Skin:   Areas Examined (abnormalities noted in diagram):   Scalp / Hair Palpated and Inspected  Head / Face Inspection Performed  Neck Inspection Performed  Chest / Axilla Inspection Performed  Back Inspection Performed              Diagram Legend     Erythematous scaling macule/papule c/w actinic keratosis       Vascular papule c/w angioma      Pigmented verrucoid papule/plaque c/w seborrheic keratosis      Yellow umbilicated papule c/w sebaceous hyperplasia      Irregularly shaped tan macule c/w lentigo     1-2 mm smooth white papules consistent with Milia      Movable subcutaneous cyst with punctum c/w epidermal inclusion cyst      Subcutaneous movable cyst c/w pilar cyst      Firm pink to brown papule c/w dermatofibroma      Pedunculated fleshy papule(s) c/w skin tag(s)      Evenly pigmented macule c/w junctional nevus     Mildly variegated pigmented, slightly irregular-bordered macule c/w mildly atypical nevus      Flesh colored to evenly pigmented papule c/w intradermal nevus       Pink pearly papule/plaque c/w basal cell carcinoma      Erythematous hyperkeratotic cursted plaque c/w SCC       Surgical scar with no sign of skin cancer recurrence      Open and closed comedones      Inflammatory papules and pustules      Verrucoid papule consistent consistent with wart     Erythematous eczematous patches and plaques     Dystrophic onycholytic nail with subungual debris c/w onychomycosis     Umbilicated papule    Erythematous-base heme-crusted tan verrucoid plaque consistent with inflamed seborrheic keratosis     Erythematous Silvery Scaling Plaque c/w Psoriasis     See annotation            Assessment / Plan:      Pathology Orders:       Normal Orders This Visit    Specimen to Pathology, Dermatology     Questions:    Procedure Type: Dermatology and skin neoplasms    Number of Specimens: 1    ------------------------: -------------------------    Spec 1 Procedure: Punch Biopsy    Spec 1 Clinical Impression: persistent hyperpigmented grouped pruritic papules and plaques. Eczema vs LP vs other    Spec 1 Source: posterior neck    Clinical Information: see EPIC    Clinical History: see EPIC    Specimen Source: Skin    Release to patient: Immediate    Send normal result to authorizing provider's In Basket if patient is active on MyChart: Yes          Dermatitis  -     BRYN; Future; Expected date: 07/03/2025  -     Specimen to Pathology, Dermatology    Punch biopsy procedure note:  Punch biopsy performed after verbal consent obtained. Area marked and prepped with alcohol. Approximately 1cc of 1% lidocaine with epinephrine injected. 3 mm disposable punch used to remove lesion. Hemostasis obtained and biopsy site closed with 1 - 2 Prolene sutures. Wound care instructions reviewed with patient and handout given.    Further management pending biopsy results.            Follow up in about 3 months (around 10/3/2025).

## 2025-07-03 NOTE — PATIENT INSTRUCTIONS
"Punch Biopsy Wound Care    Your doctor has performed a punch biopsy today.  A band aid and antibiotic ointment has been placed over the site.  This should remain in place for 24 hours.  It is recommended that you keep the area dry for the first 24 hours.  After 24 hours, you may remove the band aid and wash the area with warm soap and water and apply Vaseline jelly.  Many patients prefer to use Neosporin or Bacitracin ointment.  This is acceptable; however know that you can develop an allergy to this medication even if you have used it safely for years.  It is important to keep the area moist.  Letting it dry out and get air slows healing time, will worsen the scar, and make it more difficult to remove the stitches if they were placed.  Band aid is optional after first 24 hours.      If you notice increasing redness, tenderness, pain, or yellow drainage at the biopsy or surgical site, please notify your doctor.  These are signs of an infection.    If your biopsy/surgical site is bleeding, apply firm pressure for 15 minutes straight.  Repeat for another 15 minutes, if it is still bleeding.   If the surgical site continues to bleed, then please contact your doctor.      For [facility] users:   You will receive a [facility] notification after the pathologist has finished reviewing your biopsy specimen. Pathology results, however, will not be released online so you will see a "no content" message. Once your dermatologist reviews and clinically correlates your biopsy results, you will either receive a letter in the mail with the results of a phone call from your doctor's office if further explanation or treatment is warranted.       [address], [place] [address]/ [phone]; fax: [phone]/ [email or web address]  "

## 2025-07-08 LAB — ANA (OHS): NORMAL

## 2025-07-10 ENCOUNTER — PATIENT MESSAGE (OUTPATIENT)
Dept: DERMATOLOGY | Facility: CLINIC | Age: 70
End: 2025-07-10
Payer: MEDICARE

## 2025-07-10 ENCOUNTER — CLINICAL SUPPORT (OUTPATIENT)
Dept: DERMATOLOGY | Facility: CLINIC | Age: 70
End: 2025-07-10
Payer: MEDICARE

## 2025-07-10 DIAGNOSIS — Z48.02 VISIT FOR SUTURE REMOVAL: Primary | ICD-10-CM

## 2025-07-10 PROCEDURE — 99024 POSTOP FOLLOW-UP VISIT: CPT | Mod: HCNC,S$GLB,, | Performed by: STUDENT IN AN ORGANIZED HEALTH CARE EDUCATION/TRAINING PROGRAM

## 2025-07-10 PROCEDURE — 99999 PR PBB SHADOW E&M-EST. PATIENT-LVL II: CPT | Mod: PBBFAC,HCNC,,

## 2025-07-10 RX ORDER — TACROLIMUS 1 MG/G
OINTMENT TOPICAL 2 TIMES DAILY
Qty: 60 G | Refills: 1 | Status: SHIPPED | OUTPATIENT
Start: 2025-07-10

## 2025-07-16 ENCOUNTER — PATIENT OUTREACH (OUTPATIENT)
Dept: ADMINISTRATIVE | Facility: HOSPITAL | Age: 70
End: 2025-07-16
Payer: MEDICARE

## 2025-07-16 DIAGNOSIS — Z12.11 ENCOUNTER FOR SCREENING FOR MALIGNANT NEOPLASM OF COLON: Primary | ICD-10-CM

## 2025-07-16 NOTE — PROGRESS NOTES
Working BR COLON REPORT: Chart searched, Pt is due for Colonoscopy, Spoke with pt and he would like to schedule, Referral to Endo Procedure Schedulers placed per [facility]. Pt notified of next steps.

## 2025-07-30 DIAGNOSIS — E78.2 MIXED HYPERLIPIDEMIA: ICD-10-CM

## 2025-07-30 NOTE — TELEPHONE ENCOUNTER
Care Due:                  Date            Visit Type   Department     Provider  --------------------------------------------------------------------------------                                EP -                              PRIMARY      [facility] INTERNAL  Last Visit: 09-      CARE ([facility])   MEDICINE       Flavio Miller  Next Visit: None Scheduled  None         None Found                                                            Last  Test          Frequency    Reason                     Performed    Due Date  --------------------------------------------------------------------------------    Lipid Panel.  12 months..  atorvastatin.............  07- 07-    [facility] Embedded Care Due Messages. Reference number: [id number].   7/30/2025 3:32:30 PM CDT

## 2025-07-31 RX ORDER — ATORVASTATIN CALCIUM 40 MG/1
40 TABLET, FILM COATED ORAL DAILY
Qty: 90 TABLET | Refills: 0 | Status: SHIPPED | OUTPATIENT
Start: 2025-07-31

## 2025-07-31 NOTE — TELEPHONE ENCOUNTER
Refill Routing Note   Medication(s) are not appropriate for processing by [facility] for the following reason(s):        Required labs outdated    [facility] action(s):  Defer   Requires labs : Yes             Appointments  past 12m or future 3m with PCP    Date Provider   Last Visit   9/25/2024 Flavio Miller MD   Next Visit   Visit date not found Flavio Miller MD   ED visits in past 90 days: 0        Note composed:9:17 PM 07/30/2025

## (undated) DEVICE — TRAY MINOR GEN SURG OMC

## (undated) DEVICE — GAUZE WOVEN STRL 12-PLY 4X4IN

## (undated) DEVICE — NDL MAXCORE BIOPSY 18G 25CM

## (undated) DEVICE — GLOVE SURG BIOGEL LATEX SZ 7.5

## (undated) DEVICE — KIT TURNOVER

## (undated) DEVICE — KIT ENDOKIT COMPLIANCE CUSTOM

## (undated) DEVICE — TUBING HF INSUFFLATION W/ FLTR

## (undated) DEVICE — SUT 0 VICRYL / UR6 (J603)

## (undated) DEVICE — SUT MCRYL PLUS 4-0 PS2 27IN

## (undated) DEVICE — TROCAR ENDOPATH XCEL 11MM 10CM

## (undated) DEVICE — GUIDE BIOPSY BIPLANAR 18G

## (undated) DEVICE — JELLY LUBRICATING STERILE 2OZ

## (undated) DEVICE — CANNULA ENDOPATH XCEL 5X100MM

## (undated) DEVICE — COVER PROBE ULTRASOUND 6X48IN

## (undated) DEVICE — ADHESIVE DERMABOND ADVANCED

## (undated) DEVICE — CONTAINER FORMALIN PREFILLED

## (undated) DEVICE — SUT ENDOLOOP PDSII 18 LIGA

## (undated) DEVICE — SOL NS 1000CC

## (undated) DEVICE — HEMOSTAT SURGICEL 4X8IN

## (undated) DEVICE — IRRIGATOR ENDOSCOPY DISP.

## (undated) DEVICE — MARKER SKIN RULER STERILE

## (undated) DEVICE — DRAPE ABDOMINAL TIBURON 14X11

## (undated) DEVICE — DRAPE CORETEMP FLD WRM 56X62IN

## (undated) DEVICE — NDL HYPO REG 25G X 1 1/2

## (undated) DEVICE — SYR LUER LOCK STERILE 10ML

## (undated) DEVICE — DRAPE LEGGINGS CUFF 33X51IN

## (undated) DEVICE — TOWEL OR DISP STRL BLUE 4/PK

## (undated) DEVICE — ELECTRODE REM PLYHSV RETURN 9

## (undated) DEVICE — NDL SPINAL 22GX7 SPINOCAN

## (undated) DEVICE — SUT TICRON BLUE O SK

## (undated) DEVICE — GOWN SMARTGOWN LVL4 X-LONG XL

## (undated) DEVICE — TROCAR ENDOPATH XCEL 5X100MM

## (undated) DEVICE — SHEARS HARMONIC 5CM 36CM

## (undated) DEVICE — DRAIN PENROSE XRAY 12 X 1/4 ST

## (undated) DEVICE — DRESSING TELFA N ADH 3X8